# Patient Record
Sex: MALE | Race: WHITE | Employment: FULL TIME | URBAN - METROPOLITAN AREA
[De-identification: names, ages, dates, MRNs, and addresses within clinical notes are randomized per-mention and may not be internally consistent; named-entity substitution may affect disease eponyms.]

---

## 2019-07-15 ENCOUNTER — OFFICE VISIT (OUTPATIENT)
Dept: FAMILY MEDICINE CLINIC | Age: 64
End: 2019-07-15

## 2019-07-15 VITALS
TEMPERATURE: 98.6 F | WEIGHT: 282 LBS | DIASTOLIC BLOOD PRESSURE: 84 MMHG | SYSTOLIC BLOOD PRESSURE: 130 MMHG | OXYGEN SATURATION: 95 % | HEART RATE: 64 BPM

## 2019-07-15 DIAGNOSIS — I48.91 ATRIAL FIBRILLATION, UNSPECIFIED TYPE (HCC): Primary | ICD-10-CM

## 2019-07-15 DIAGNOSIS — F32.A DEPRESSION, UNSPECIFIED DEPRESSION TYPE: ICD-10-CM

## 2019-07-15 DIAGNOSIS — I10 ESSENTIAL HYPERTENSION: ICD-10-CM

## 2019-07-15 LAB
INTERNATIONAL NORMALIZATION RATIO, POC: 2.7
PROTHROMBIN TIME, POC: 32.4

## 2019-07-15 PROCEDURE — 85610 PROTHROMBIN TIME: CPT | Performed by: NURSE PRACTITIONER

## 2019-07-15 PROCEDURE — 99202 OFFICE O/P NEW SF 15 MIN: CPT | Performed by: NURSE PRACTITIONER

## 2019-07-15 RX ORDER — WARFARIN SODIUM 7.5 MG/1
7.5 TABLET ORAL EVERY OTHER DAY
Qty: 30 TABLET | Refills: 0 | Status: SHIPPED | OUTPATIENT
Start: 2019-07-15 | End: 2019-08-14 | Stop reason: SDUPTHER

## 2019-07-15 RX ORDER — PAROXETINE 10 MG/1
10 TABLET, FILM COATED ORAL EVERY MORNING
COMMUNITY
End: 2019-07-15 | Stop reason: SDUPTHER

## 2019-07-15 RX ORDER — WARFARIN SODIUM 6 MG/1
6 TABLET ORAL EVERY OTHER DAY
Qty: 30 TABLET | Refills: 0 | Status: SHIPPED | OUTPATIENT
Start: 2019-07-15 | End: 2019-08-14 | Stop reason: SDUPTHER

## 2019-07-15 RX ORDER — PAROXETINE 10 MG/1
10 TABLET, FILM COATED ORAL EVERY MORNING
Qty: 30 TABLET | Refills: 1 | Status: SHIPPED | OUTPATIENT
Start: 2019-07-15 | End: 2019-08-14 | Stop reason: SDUPTHER

## 2019-07-15 RX ORDER — WARFARIN SODIUM 7.5 MG/1
7.5 TABLET ORAL
COMMUNITY
End: 2019-07-15 | Stop reason: SDUPTHER

## 2019-07-15 ASSESSMENT — ENCOUNTER SYMPTOMS
RESPIRATORY NEGATIVE: 1
NAUSEA: 0
DIARRHEA: 0
VOMITING: 0

## 2019-07-15 NOTE — PROGRESS NOTES
Sudhakar Maldonado   58 y.o.  male  [id number]      Chief Complaint   Patient presents with    Coagulation Disorder    Medication Refill        Subjective:  63 y.o.male is here for a follow up. He has the following chronic/acute medical problems: There is no problem list on file for this patient. Patient is here today because he just moved from [place]. He is needing his INR checked and medication refills - Coumadin, Metoprolol, and Paxil. Patient states he takes 6 mg of coumadin every other day then 7.5 mg every other day. Patient states he is doing well. Review of Systems   Constitutional: Negative for appetite change, chills, fatigue and fever. HENT: Negative. Respiratory: Negative. Cardiovascular: Negative for chest pain and palpitations. Gastrointestinal: Negative for diarrhea, nausea and vomiting. Skin: Negative for rash. Neurological: Negative for dizziness, light-headedness and headaches. Current Outpatient Medications   Medication Sig Dispense Refill    metoprolol tartrate (LOPRESSOR) 25 MG tablet Take 25 mg by mouth daily      PARoxetine (PAXIL) 10 MG tablet Take 10 mg by mouth every morning      warfarin (COUMADIN) 7.5 MG tablet Take 7.5 mg by mouth       No current facility-administered medications for this visit. Past medical, family,surgical history reviewed today. Objective:  /84   Pulse 64   Temp 98.6 °F (37 °C) (Oral)   Wt 282 lb (127.9 kg)   SpO2 95%   BP Readings from Last 3 Encounters:   07/15/19 130/84     Wt Readings from Last 3 Encounters:   07/15/19 282 lb (127.9 kg)         Physical Exam   Constitutional: He is oriented to person, place, and time. He appears well-developed and well-nourished. HENT:   Head: Normocephalic. Neck: Neck supple. Cardiovascular: Normal rate, regular rhythm and normal heart sounds.    Pulmonary/Chest: Effort normal and breath sounds normal.   Neurological: He is alert and oriented to person, place, and time.   Skin: Skin is warm and dry. Psychiatric: He has a normal mood and affect. His behavior is normal.       No results found for: WBC, HGB, HCT, MCV, PLT  No results found for: NA, K, CL, CO2, BUN, CREATININE, GLUCOSE, CALCIUM, PROT, LABALBU, BILITOT, ALKPHOS, AST, ALT, LABGLOM, GFRAA, AGRATIO, GLOB  No results found for: CHOL  No results found for: TRIG  No results found for: HDL  No results found for: LDLCALC, LDLCHOLESTEROL  No results found for: LABA1C  No results found for: TSHFT4, TSH, TSHHS    Results for orders placed or performed in visit on 07/15/19   POCT INR   Result Value Ref Range    INR 2.7     Protime 32.4          ASSESSMENT/PLAN:      1. Atrial fibrillation, unspecified type ([facility]  Continue current coumadin dose. Patient to establish care with a PCP on 8/14.  - POCT INR  - warfarin (COUMADIN) 7.5 MG tablet; Take 1 tablet by mouth every other day Take 7.5 mg every other day  Dispense: 30 tablet; Refill: 0  - warfarin (COUMADIN) 6 MG tablet; Take 1 tablet by mouth every other day  Dispense: 30 tablet; Refill: 0    2. Depression, unspecified depression type  Stable. Refilled medication.  - PARoxetine (PAXIL) 10 MG tablet; Take 1 tablet by mouth every morning  Dispense: 30 tablet; Refill: 1    3. Essential hypertension  Stable. Refilled medication.   - metoprolol tartrate (LOPRESSOR) 25 MG tablet; Take 1 tablet by mouth daily  Dispense: 30 tablet; Refill: 1        No orders of the defined types were placed in this encounter. There are no discontinued medications. Care discussed with patient. Questions answered. Patient verbalizes understanding and agrees with plan. After visit summary provided. Advised to call for any problems, questions, or concerns. No follow-ups on file.                                            Signed:  INES Gutiérrez CNP  07/15/19  3:21 PM

## 2019-08-14 ENCOUNTER — OFFICE VISIT (OUTPATIENT)
Dept: FAMILY MEDICINE CLINIC | Age: 64
End: 2019-08-14
Payer: MEDICARE

## 2019-08-14 VITALS
SYSTOLIC BLOOD PRESSURE: 140 MMHG | WEIGHT: 282 LBS | HEIGHT: 69 IN | HEART RATE: 69 BPM | OXYGEN SATURATION: 97 % | DIASTOLIC BLOOD PRESSURE: 80 MMHG | TEMPERATURE: 96.6 F | BODY MASS INDEX: 41.77 KG/M2

## 2019-08-14 DIAGNOSIS — F32.A DEPRESSION, UNSPECIFIED DEPRESSION TYPE: ICD-10-CM

## 2019-08-14 DIAGNOSIS — Z98.890 S/P COMPARTMENT SYNDROME DECOMPRESSION: ICD-10-CM

## 2019-08-14 DIAGNOSIS — I10 ESSENTIAL HYPERTENSION: Primary | ICD-10-CM

## 2019-08-14 DIAGNOSIS — I87.8 VENOUS CONGESTION: ICD-10-CM

## 2019-08-14 DIAGNOSIS — Z79.01 ANTICOAGULATED ON COUMADIN: ICD-10-CM

## 2019-08-14 DIAGNOSIS — E66.01 MORBID OBESITY WITH BMI OF 40.0-44.9, ADULT (HCC): ICD-10-CM

## 2019-08-14 DIAGNOSIS — Z76.89 ESTABLISHING CARE WITH NEW DOCTOR, ENCOUNTER FOR: ICD-10-CM

## 2019-08-14 DIAGNOSIS — I48.91 ATRIAL FIBRILLATION, UNSPECIFIED TYPE (HCC): ICD-10-CM

## 2019-08-14 DIAGNOSIS — G47.30 SLEEP APNEA, UNSPECIFIED TYPE: ICD-10-CM

## 2019-08-14 LAB
INTERNATIONAL NORMALIZATION RATIO, POC: 2.9
PROTHROMBIN TIME, POC: NORMAL

## 2019-08-14 PROCEDURE — G8417 CALC BMI ABV UP PARAM F/U: HCPCS | Performed by: FAMILY MEDICINE

## 2019-08-14 PROCEDURE — G8427 DOCREV CUR MEDS BY ELIG CLIN: HCPCS | Performed by: FAMILY MEDICINE

## 2019-08-14 PROCEDURE — 1036F TOBACCO NON-USER: CPT | Performed by: FAMILY MEDICINE

## 2019-08-14 PROCEDURE — 3017F COLORECTAL CA SCREEN DOC REV: CPT | Performed by: FAMILY MEDICINE

## 2019-08-14 PROCEDURE — 85610 PROTHROMBIN TIME: CPT | Performed by: FAMILY MEDICINE

## 2019-08-14 PROCEDURE — 99214 OFFICE O/P EST MOD 30 MIN: CPT | Performed by: FAMILY MEDICINE

## 2019-08-14 RX ORDER — WARFARIN SODIUM 6 MG/1
6 TABLET ORAL EVERY OTHER DAY
Qty: 90 TABLET | Refills: 0 | Status: SHIPPED | OUTPATIENT
Start: 2019-08-14 | End: 2019-09-03 | Stop reason: ALTCHOICE

## 2019-08-14 RX ORDER — WARFARIN SODIUM 7.5 MG/1
7.5 TABLET ORAL EVERY OTHER DAY
Qty: 90 TABLET | Refills: 0 | Status: SHIPPED | OUTPATIENT
Start: 2019-08-14 | End: 2019-09-03 | Stop reason: ALTCHOICE

## 2019-08-14 RX ORDER — PAROXETINE 10 MG/1
10 TABLET, FILM COATED ORAL EVERY MORNING
Qty: 90 TABLET | Refills: 1 | Status: SHIPPED | OUTPATIENT
Start: 2019-08-14 | End: 2020-03-10 | Stop reason: SDUPTHER

## 2019-08-14 ASSESSMENT — ENCOUNTER SYMPTOMS
VOMITING: 0
SHORTNESS OF BREATH: 0
COUGH: 0
DIARRHEA: 0
ABDOMINAL PAIN: 0
BLOOD IN STOOL: 0
NAUSEA: 0
CONSTIPATION: 0

## 2019-09-03 ENCOUNTER — NURSE ONLY (OUTPATIENT)
Dept: CARDIOLOGY CLINIC | Age: 64
End: 2019-09-03

## 2019-09-03 ENCOUNTER — INITIAL CONSULT (OUTPATIENT)
Dept: CARDIOLOGY CLINIC | Age: 64
End: 2019-09-03
Payer: MEDICARE

## 2019-09-03 VITALS
WEIGHT: 282.4 LBS | SYSTOLIC BLOOD PRESSURE: 120 MMHG | HEART RATE: 60 BPM | HEIGHT: 70 IN | BODY MASS INDEX: 40.43 KG/M2 | DIASTOLIC BLOOD PRESSURE: 62 MMHG

## 2019-09-03 DIAGNOSIS — I10 ESSENTIAL HYPERTENSION: ICD-10-CM

## 2019-09-03 DIAGNOSIS — R00.2 PALPITATIONS: Primary | ICD-10-CM

## 2019-09-03 DIAGNOSIS — Z79.01 ANTICOAGULATED ON COUMADIN: ICD-10-CM

## 2019-09-03 DIAGNOSIS — M79.89 LEG SWELLING: ICD-10-CM

## 2019-09-03 DIAGNOSIS — G45.9 TIA (TRANSIENT ISCHEMIC ATTACK): ICD-10-CM

## 2019-09-03 DIAGNOSIS — R00.2 PALPITATIONS: ICD-10-CM

## 2019-09-03 DIAGNOSIS — I48.91 ATRIAL FIBRILLATION, UNSPECIFIED TYPE (HCC): ICD-10-CM

## 2019-09-03 DIAGNOSIS — G47.30 SLEEP APNEA, UNSPECIFIED TYPE: ICD-10-CM

## 2019-09-03 DIAGNOSIS — I48.91 ATRIAL FIBRILLATION, UNSPECIFIED TYPE (HCC): Primary | ICD-10-CM

## 2019-09-03 PROCEDURE — 93000 ELECTROCARDIOGRAM COMPLETE: CPT | Performed by: INTERNAL MEDICINE

## 2019-09-03 PROCEDURE — G8427 DOCREV CUR MEDS BY ELIG CLIN: HCPCS | Performed by: INTERNAL MEDICINE

## 2019-09-03 PROCEDURE — G8417 CALC BMI ABV UP PARAM F/U: HCPCS | Performed by: INTERNAL MEDICINE

## 2019-09-03 PROCEDURE — 99204 OFFICE O/P NEW MOD 45 MIN: CPT | Performed by: INTERNAL MEDICINE

## 2019-09-03 NOTE — PROGRESS NOTES
CARDIOLOGY CONSULT  NOTE    Chief Complaint: Afib    HPI:   Mena Meza is a 71y.o. year old who has Past medical history as noted below. Very pleasant gentleman with history of atrial fibrillation who comes in to establish care he is a retired  currently working part-time in [employer]. He was first diagnosed with atrial fibrillation at the age of 31 in 12. He was a runner at that time. He was cardioverted back into rhythm. Most recently up to 2008 he was doing well until he had a TIA event. He has been on Coumadin since then. Last year he had another episode of A. fib requiring hospitalization. Currently denies any palpitations chest pain or shortness of breath. Times he gets a feeling of \"blood sugar drop\" although he does not have diabetes but denies any palpitations      Current Outpatient Medications   Medication Sig Dispense Refill    rivaroxaban (XARELTO) 20 MG TABS tablet Take 1 tablet by mouth daily (with breakfast) 90 tablet 1    rivaroxaban (XARELTO) 20 MG TABS tablet Take 1 tablet by mouth daily 28 tablet 0    metoprolol tartrate (LOPRESSOR) 25 MG tablet Take 1 tablet by mouth daily 90 tablet 1    PARoxetine (PAXIL) 10 MG tablet Take 1 tablet by mouth every morning 90 tablet 1     No current facility-administered medications for this visit.         Allergies:   Aspirin    Patient History:  Past Medical History:   Diagnosis Date    Atrial fibrillation ([address]     Depression     Sleep apnea     TIA (transient ischemic attack) 2008     Past Surgical History:   Procedure Laterality Date    ABDOMEN SURGERY  02/2019    bowel perforation repair    CHOLECYSTECTOMY      FOOT SURGERY Bilateral     LEG SURGERY  2017    compartment syndrome right leg    TONSILLECTOMY       Family History   Problem Relation Age of Onset    Heart Attack Mother     Cancer Mother         ovarian    Other Mother         dementia    Other Father         murdered    thrills,S1 and S2 audible, No added heart sounds, No signs of ankle edema, or volume overload, No evidence of JVD, No crackles  GI:  Bowel sounds normal, Soft, No tenderness, No masses, No gross visceromegaly   :  No costovertebral angle tenderness   Musculoskeletal:  No edema, no tenderness, no deformities. Back- no tenderness  Integument:  Well hydrated, no rash   Lymphatic:  No lymphadenopathy noted   Neurologic:  Alert & oriented x 3, CN 2-12 normal, normal motor function, normal sensory function, no focal deficits noted   Psychiatric:  Speech and behavior appropriate       Medical decision making and Data review:  DATA:  No results found for: TROPONINT  BNP:  No results found for: PROBNP  PT/INR:  No results found for: PTINR  No results found for: LABA1C  No results found for: CHOL, TRIG, HDL, LDLCALC, LDLDIRECT  No results found for: ALT, AST  No results for input(s): WBC, HGB, HCT, MCV, PLT in the last 72 hours. TSH: No results found for: TSH  No results found for: AST, ALT, ALB, BILIDIR, BILITOT, ALKPHOS  No results found for: PROBNP  No results found for: LABA1C  No results found for: WBC, HGB, HCT, PLT  All labs, medications and tests reviewed by myself including data and history from outside source , patient and available family . Assessment & Plan:      1. Palpitations    2. Atrial fibrillation, unspecified type ([address]    3. Essential hypertension    4. Sleep apnea, unspecified type    5. Anticoagulated on Coumadin    6. TIA (transient ischemic attack)    7. Leg swelling         Atrial fibrillation (HCC)  HE has anju cardioverted once in 1986 when he was 31years old, sicne then he has had 3 to 4 more episodes of afib requiring hospital stay , IN Jan 2008 he had TIA he has been on coumadin since then . Start Xarelto if it is not too expensive we will continue it we will also give him samples advised to hold Coumadin for 5 days and then start Xarelto.   We will also get an echo to evaluate for valvular

## 2019-09-03 NOTE — ASSESSMENT & PLAN NOTE
He reports that he is compliant with CPAP machine we talked about LINDA fib and relationship with sleep apnea

## 2019-09-03 NOTE — ASSESSMENT & PLAN NOTE
HE has anju cardioverted once in [facility] when he was 31years old, sicne then he has had 3 to 4 more episodes of afib requiring hospital stay , IN Jan 2008 he had TIA he has been on coumadin since then . Start Xarelto if it is not too expensive we will continue it we will also give him samples advised to hold Coumadin for 5 days and then start Xarelto. We will also get an echo to evaluate for valvular heart disease and EF. We will place him on a 30-day monitor to evaluate for A. fib burden and decide if he needs antiarrhythmics.   If he needs antiarrhythmic she will need a stress test.

## 2019-09-09 ENCOUNTER — TELEPHONE (OUTPATIENT)
Dept: CARDIOLOGY CLINIC | Age: 64
End: 2019-09-09

## 2019-09-09 ENCOUNTER — PROCEDURE VISIT (OUTPATIENT)
Dept: CARDIOLOGY CLINIC | Age: 64
End: 2019-09-09
Payer: MEDICARE

## 2019-09-09 VITALS
WEIGHT: 282 LBS | SYSTOLIC BLOOD PRESSURE: 124 MMHG | DIASTOLIC BLOOD PRESSURE: 72 MMHG | HEIGHT: 70 IN | BODY MASS INDEX: 40.37 KG/M2 | HEART RATE: 53 BPM

## 2019-09-09 DIAGNOSIS — R00.2 PALPITATIONS: ICD-10-CM

## 2019-09-09 DIAGNOSIS — I48.91 ATRIAL FIBRILLATION, UNSPECIFIED TYPE (HCC): Primary | ICD-10-CM

## 2019-09-09 DIAGNOSIS — I48.91 ATRIAL FIBRILLATION, UNSPECIFIED TYPE (HCC): ICD-10-CM

## 2019-09-09 DIAGNOSIS — Z79.01 ANTICOAGULATED ON COUMADIN: ICD-10-CM

## 2019-09-09 DIAGNOSIS — G47.30 SLEEP APNEA, UNSPECIFIED TYPE: ICD-10-CM

## 2019-09-09 DIAGNOSIS — M79.602 LEFT ARM PAIN: Primary | ICD-10-CM

## 2019-09-09 DIAGNOSIS — I10 ESSENTIAL HYPERTENSION: ICD-10-CM

## 2019-09-09 DIAGNOSIS — G45.9 TIA (TRANSIENT ISCHEMIC ATTACK): ICD-10-CM

## 2019-09-09 PROCEDURE — 93228 REMOTE 30 DAY ECG REV/REPORT: CPT | Performed by: INTERNAL MEDICINE

## 2019-09-09 PROCEDURE — 93015 CV STRESS TEST SUPVJ I&R: CPT | Performed by: INTERNAL MEDICINE

## 2019-09-09 NOTE — LETTER
Devyn            [address] [facility], [address], [facility], [address] [facility]  [address], [address]    INFORMED CONSENT FOR EXERCISE STRESS TEST    Patient Name:Isaiah Larry      [facility]#:[id number]    Purpose & Explanation  Today I will perform an exercise test to determine my exercise capacity & state of cardiovascular health. I understand that the test will be performed on a motor-driven treadmill with the amount of effort gradually increasing. The exercise intensity will begin at a level one can easily accomplish & will be advanced in stages depending on my fitness level. During the exercise test, I consent to the monitoring of EKG, blood pressure, & expressed feelings of discomfort or effort. I have been informed that I can stop the test at any time because of signs of discomfort or fatigue. Risks & Discomforts   There exists the possibility of certain changes occurring during the test. They include:  abnormal blood pressure, fainting, disorder of the heart beat, & in rare instances, heart attack, stroke, or death. An added risk is that of self injury by tripping or falling while on the motorized treadmill. Every effort will be made to minimize these risks by evaluation of preliminary information relating to my health, fitness, & observations during the testing. Emergency equipment & trained personnel are available to deal with the unusual situations should they occur. I understand the possibility of these risks,  but it is my desire to proceed with taking this test as herein indicated. Responsibilities of the patient  Information you possess about your health status or previous experience of unusual feelings with physical effort may affect the safety & value of your exercise test. Your prompt reporting of feelings with effort during the exercise test itself, are also of great importance.  You are responsible to fully disclose such information when requested by the testing staff. Benefits to be Expected   The results obtained from the exercise test may assist in the diagnosis of your illness or in evaluating what type of physical activities you might do with low risk of harm. Inquires  Any questions about the procedures used in the exercise test or in the measurement of functional capacity are encouraged. If you have any doubts or questions, please ask us for further explanations. The information which is obtained will be treated as privileged, confidential, & will not be released or revealed to any such person, except my physician without my written consent. The information obtained, however, may be used for a statistical or scientific purpose with my right to privacy retained. Freedom of consents  Your permission to perform this exercise test is voluntary. You are free to deny consent or stop the test at any point, if you so desire. I HAVE READ THIS FORM & I UNDERSTAND THE TEST PROCEDURES THAT I WILL PERFORM. I CONSENT TO PARTICIPATE IN THIS TEST. SIGNATURE OF PATIENT:_____________________________________DATE: _____________    Tran [place]ren OF WITNESS:____________________________________DATE: _____________          REGULAR TREADMILL STRESS TEST   Date:______________    Patient Name:Isaiah Summers  [facility]#:[id number]  Doctor:Dr. Shon Hilton  Age:64 y.o. Sex:male    Allergies:Aspirin    Ht:  5'10\"   Wt: 282#    Medical History:                                             **Indications for Test:  Abn.EKG-A.Fib. Past Medical History:   Diagnosis Date    Abnormal EKG     Atrial fibrillation (HCC) 1986    Depression     Family history of coronary artery disease     Mother-MI.     Non-smoker     Obesity     Sleep apnea     TIA (transient ischemic attack) 2008     Chest Pain Character: DENIES Burning  Pounding  Dull Ache/Discomfort     Squeezing  Pressure Twinge Sharp/Stab Palpitations Heaviness  Soreness SOB/Tightness   Location: Substernal  Anterior Chest  Precordial Pain  Other   Radiation:  None  Left   Arm  Right   Arm Lt. Rt.    Jaw    Lt. Rt. Neck Back  Right Shoulder  Left Shoulder   Precip. Factors: None  Exertion  Exercise  Stress     Eating  Other      Relieved By:  Rest  NTG Antacids  Other                                                      **COMMENTS DURING STRESS**  Chest Pain:   Arrhythmia:   ST Changes:   Symptoms:    SOB    Fatigue    Flushing/Diaphoresis    Palpitations    Dizziness            Sx's Resolved      THR Achieved:           YES        NO           N/A   Exercise Effort:          Poor        Fair         Good          Excellent         N/A   B-Blocker Held:          YES        NO          N/A          **Beta-Blocker: Lopressor (metoprolol)   Harmeet-dur Held:            YES        NO          N/A      METS:                                         %MPHR:                                  **THR: _____________**        Pre BP:  Recovery BP:  Heart Rate:   Post BP:  2 min BP:    Resting HR:  3 min BP:    Max. HR: 4 min BP:     Tot. Exer. Time:                          min Max. Speed:  Elevation:                         %           Stage  MPH Grade Time B/P HR   I 1.7 10%      II 2.5 12%      III 3.4 14%      IV 4.2 16%      V 5 18%

## 2019-09-10 ENCOUNTER — TELEPHONE (OUTPATIENT)
Dept: CARDIOLOGY CLINIC | Age: 64
End: 2019-09-10

## 2019-09-10 DIAGNOSIS — I48.91 ATRIAL FIBRILLATION, UNSPECIFIED TYPE (HCC): Primary | ICD-10-CM

## 2019-09-11 ENCOUNTER — PROCEDURE VISIT (OUTPATIENT)
Dept: CARDIOLOGY CLINIC | Age: 64
End: 2019-09-11
Payer: MEDICARE

## 2019-09-11 ENCOUNTER — NURSE ONLY (OUTPATIENT)
Dept: FAMILY MEDICINE CLINIC | Age: 64
End: 2019-09-11
Payer: MEDICARE

## 2019-09-11 DIAGNOSIS — G45.9 TIA (TRANSIENT ISCHEMIC ATTACK): ICD-10-CM

## 2019-09-11 DIAGNOSIS — I10 ESSENTIAL HYPERTENSION: ICD-10-CM

## 2019-09-11 DIAGNOSIS — R00.2 PALPITATIONS: ICD-10-CM

## 2019-09-11 DIAGNOSIS — I48.91 ATRIAL FIBRILLATION, UNSPECIFIED TYPE (HCC): ICD-10-CM

## 2019-09-11 DIAGNOSIS — I48.91 ATRIAL FIBRILLATION, UNSPECIFIED TYPE (HCC): Primary | ICD-10-CM

## 2019-09-11 DIAGNOSIS — Z79.01 ANTICOAGULATED ON COUMADIN: Primary | ICD-10-CM

## 2019-09-11 DIAGNOSIS — Z79.01 ANTICOAGULATED ON COUMADIN: ICD-10-CM

## 2019-09-11 DIAGNOSIS — G47.30 SLEEP APNEA, UNSPECIFIED TYPE: ICD-10-CM

## 2019-09-11 LAB
INTERNATIONAL NORMALIZATION RATIO, POC: 1.7
LV EF: 48 %
LV EF: 58 %
LVEF MODALITY: NORMAL
LVEF MODALITY: NORMAL
PROTHROMBIN TIME, POC: ABNORMAL

## 2019-09-11 PROCEDURE — 78452 HT MUSCLE IMAGE SPECT MULT: CPT | Performed by: INTERNAL MEDICINE

## 2019-09-11 PROCEDURE — A9500 TC99M SESTAMIBI: HCPCS | Performed by: INTERNAL MEDICINE

## 2019-09-11 PROCEDURE — 93016 CV STRESS TEST SUPVJ ONLY: CPT | Performed by: INTERNAL MEDICINE

## 2019-09-11 PROCEDURE — 85610 PROTHROMBIN TIME: CPT | Performed by: FAMILY MEDICINE

## 2019-09-11 PROCEDURE — 93306 TTE W/DOPPLER COMPLETE: CPT | Performed by: INTERNAL MEDICINE

## 2019-09-11 PROCEDURE — 93017 CV STRESS TEST TRACING ONLY: CPT | Performed by: INTERNAL MEDICINE

## 2019-09-11 PROCEDURE — 93018 CV STRESS TEST I&R ONLY: CPT | Performed by: INTERNAL MEDICINE

## 2019-09-12 ENCOUNTER — TELEPHONE (OUTPATIENT)
Dept: CARDIOLOGY CLINIC | Age: 64
End: 2019-09-12

## 2019-09-23 ENCOUNTER — OFFICE VISIT (OUTPATIENT)
Dept: FAMILY MEDICINE CLINIC | Age: 64
End: 2019-09-23
Payer: MEDICARE

## 2019-09-23 DIAGNOSIS — Z79.01 ANTICOAGULATED ON COUMADIN: Primary | ICD-10-CM

## 2019-09-23 LAB
INTERNATIONAL NORMALIZATION RATIO, POC: 2.7
PROTHROMBIN TIME, POC: NORMAL

## 2019-09-23 PROCEDURE — 85610 PROTHROMBIN TIME: CPT | Performed by: FAMILY MEDICINE

## 2019-10-10 ENCOUNTER — TELEPHONE (OUTPATIENT)
Dept: CARDIOLOGY CLINIC | Age: 64
End: 2019-10-10

## 2019-10-14 ENCOUNTER — PROCEDURE VISIT (OUTPATIENT)
Dept: CARDIOLOGY CLINIC | Age: 64
End: 2019-10-14
Payer: MEDICARE

## 2019-10-14 DIAGNOSIS — M79.89 LEG SWELLING: ICD-10-CM

## 2019-10-14 DIAGNOSIS — G47.30 SLEEP APNEA, UNSPECIFIED TYPE: ICD-10-CM

## 2019-10-14 DIAGNOSIS — G45.9 TIA (TRANSIENT ISCHEMIC ATTACK): ICD-10-CM

## 2019-10-14 DIAGNOSIS — I48.91 ATRIAL FIBRILLATION, UNSPECIFIED TYPE (HCC): ICD-10-CM

## 2019-10-14 DIAGNOSIS — I10 ESSENTIAL HYPERTENSION: ICD-10-CM

## 2019-10-14 DIAGNOSIS — R00.2 PALPITATIONS: ICD-10-CM

## 2019-10-14 DIAGNOSIS — Z79.01 ANTICOAGULATED ON COUMADIN: ICD-10-CM

## 2019-10-14 PROCEDURE — 93970 EXTREMITY STUDY: CPT | Performed by: INTERNAL MEDICINE

## 2019-10-15 ENCOUNTER — TELEPHONE (OUTPATIENT)
Dept: CARDIOLOGY CLINIC | Age: 64
End: 2019-10-15

## 2019-10-23 ENCOUNTER — NURSE ONLY (OUTPATIENT)
Dept: FAMILY MEDICINE CLINIC | Age: 64
End: 2019-10-23
Payer: MEDICARE

## 2019-10-23 DIAGNOSIS — Z79.01 ANTICOAGULATED ON COUMADIN: Primary | ICD-10-CM

## 2019-10-23 LAB
INTERNATIONAL NORMALIZATION RATIO, POC: 2.8
PROTHROMBIN TIME, POC: NORMAL

## 2019-10-23 PROCEDURE — 85610 PROTHROMBIN TIME: CPT | Performed by: FAMILY MEDICINE

## 2019-11-05 ENCOUNTER — OFFICE VISIT (OUTPATIENT)
Dept: CARDIOLOGY CLINIC | Age: 64
End: 2019-11-05
Payer: MEDICARE

## 2019-11-05 VITALS
WEIGHT: 276.2 LBS | HEART RATE: 60 BPM | DIASTOLIC BLOOD PRESSURE: 82 MMHG | HEIGHT: 70 IN | SYSTOLIC BLOOD PRESSURE: 118 MMHG | BODY MASS INDEX: 39.54 KG/M2

## 2019-11-05 DIAGNOSIS — I10 ESSENTIAL HYPERTENSION: ICD-10-CM

## 2019-11-05 DIAGNOSIS — Z79.01 ANTICOAGULATED ON COUMADIN: ICD-10-CM

## 2019-11-05 DIAGNOSIS — M79.89 LEG SWELLING: Primary | ICD-10-CM

## 2019-11-05 DIAGNOSIS — G47.30 SLEEP APNEA, UNSPECIFIED TYPE: ICD-10-CM

## 2019-11-05 DIAGNOSIS — G45.9 TIA (TRANSIENT ISCHEMIC ATTACK): ICD-10-CM

## 2019-11-05 DIAGNOSIS — Z98.890 S/P COMPARTMENT SYNDROME DECOMPRESSION: ICD-10-CM

## 2019-11-05 DIAGNOSIS — I48.91 ATRIAL FIBRILLATION, UNSPECIFIED TYPE (HCC): ICD-10-CM

## 2019-11-05 PROCEDURE — 1036F TOBACCO NON-USER: CPT | Performed by: INTERNAL MEDICINE

## 2019-11-05 PROCEDURE — 4040F PNEUMOC VAC/ADMIN/RCVD: CPT | Performed by: INTERNAL MEDICINE

## 2019-11-05 PROCEDURE — G8427 DOCREV CUR MEDS BY ELIG CLIN: HCPCS | Performed by: INTERNAL MEDICINE

## 2019-11-05 PROCEDURE — G8417 CALC BMI ABV UP PARAM F/U: HCPCS | Performed by: INTERNAL MEDICINE

## 2019-11-05 PROCEDURE — 3017F COLORECTAL CA SCREEN DOC REV: CPT | Performed by: INTERNAL MEDICINE

## 2019-11-05 PROCEDURE — G8484 FLU IMMUNIZE NO ADMIN: HCPCS | Performed by: INTERNAL MEDICINE

## 2019-11-05 PROCEDURE — 1123F ACP DISCUSS/DSCN MKR DOCD: CPT | Performed by: INTERNAL MEDICINE

## 2019-11-05 PROCEDURE — 99214 OFFICE O/P EST MOD 30 MIN: CPT | Performed by: INTERNAL MEDICINE

## 2019-11-05 RX ORDER — WARFARIN SODIUM 6 MG/1
6 TABLET ORAL
COMMUNITY
End: 2019-12-30

## 2019-11-11 ENCOUNTER — HOSPITAL ENCOUNTER (OUTPATIENT)
Age: 64
Discharge: HOME | End: 2019-11-11
Payer: MEDICARE

## 2019-11-11 ENCOUNTER — TELEPHONE (OUTPATIENT)
Dept: CARDIOLOGY CLINIC | Age: 64
End: 2019-11-11

## 2019-11-11 ENCOUNTER — HOSPITAL ENCOUNTER (OUTPATIENT)
Dept: GENERAL RADIOLOGY | Age: 64
Discharge: HOME | End: 2019-11-11
Payer: MEDICARE

## 2019-11-11 DIAGNOSIS — Z01.810 PRE-OPERATIVE CARDIOVASCULAR EXAMINATION: ICD-10-CM

## 2019-11-11 LAB
ABO/RH: NORMAL
ANION GAP SERPL CALCULATED.3IONS-SCNC: 12 MMOL/L (ref 4–16)
ANTIBODY SCREEN: NEGATIVE
APTT: 26.7 SECONDS (ref 25.1–37.1)
BASOPHILS ABSOLUTE: 0 K/CU MM
BASOPHILS RELATIVE PERCENT: 0.5 % (ref 0–1)
BUN BLDV-MCNC: 11 MG/DL (ref 6–23)
CALCIUM SERPL-MCNC: 9.7 MG/DL (ref 8.3–10.6)
CHLORIDE BLD-SCNC: 102 MMOL/L (ref 99–110)
CO2: 25 MMOL/L (ref 21–32)
COMMENT: NORMAL
CREAT SERPL-MCNC: 0.8 MG/DL (ref 0.9–1.3)
DIFFERENTIAL TYPE: ABNORMAL
EOSINOPHILS ABSOLUTE: 0.3 K/CU MM
EOSINOPHILS RELATIVE PERCENT: 3.8 % (ref 0–3)
GFR AFRICAN AMERICAN: >60 ML/MIN/1.73M2
GFR NON-AFRICAN AMERICAN: >60 ML/MIN/1.73M2
GLUCOSE BLD-MCNC: 97 MG/DL (ref 70–99)
HCT VFR BLD CALC: 49.3 % (ref 42–52)
HEMOGLOBIN: 16.7 GM/DL (ref 13.5–18)
IMMATURE NEUTROPHIL %: 0.2 % (ref 0–0.43)
INR BLD: 1.1 INDEX
LYMPHOCYTES ABSOLUTE: 1.4 K/CU MM
LYMPHOCYTES RELATIVE PERCENT: 21 % (ref 24–44)
MCH RBC QN AUTO: 32.4 PG (ref 27–31)
MCHC RBC AUTO-ENTMCNC: 33.9 % (ref 32–36)
MCV RBC AUTO: 95.5 FL (ref 78–100)
MONOCYTES ABSOLUTE: 0.8 K/CU MM
MONOCYTES RELATIVE PERCENT: 12.4 % (ref 0–4)
NUCLEATED RBC %: 0 %
PDW BLD-RTO: 13.2 % (ref 11.7–14.9)
PLATELET # BLD: 208 K/CU MM (ref 140–440)
PMV BLD AUTO: 11.4 FL (ref 7.5–11.1)
POTASSIUM SERPL-SCNC: 4.6 MMOL/L (ref 3.5–5.1)
PROTHROMBIN TIME: 12.8 SECONDS (ref 11.7–14.5)
RBC # BLD: 5.16 M/CU MM (ref 4.6–6.2)
SEGMENTED NEUTROPHILS ABSOLUTE COUNT: 4.1 K/CU MM
SEGMENTED NEUTROPHILS RELATIVE PERCENT: 62.1 % (ref 36–66)
SODIUM BLD-SCNC: 139 MMOL/L (ref 135–145)
TOTAL IMMATURE NEUTOROPHIL: 0.01 K/CU MM
TOTAL NUCLEATED RBC: 0 K/CU MM
WBC # BLD: 6.6 K/CU MM (ref 4–10.5)

## 2019-11-11 PROCEDURE — 85610 PROTHROMBIN TIME: CPT

## 2019-11-11 PROCEDURE — 80048 BASIC METABOLIC PNL TOTAL CA: CPT

## 2019-11-11 PROCEDURE — 71046 X-RAY EXAM CHEST 2 VIEWS: CPT

## 2019-11-11 PROCEDURE — 86850 RBC ANTIBODY SCREEN: CPT

## 2019-11-11 PROCEDURE — 86900 BLOOD TYPING SEROLOGIC ABO: CPT

## 2019-11-11 PROCEDURE — 36415 COLL VENOUS BLD VENIPUNCTURE: CPT

## 2019-11-11 PROCEDURE — 85730 THROMBOPLASTIN TIME PARTIAL: CPT

## 2019-11-11 PROCEDURE — 86901 BLOOD TYPING SEROLOGIC RH(D): CPT

## 2019-11-11 PROCEDURE — 85025 COMPLETE CBC W/AUTO DIFF WBC: CPT

## 2019-11-12 ENCOUNTER — HOSPITAL ENCOUNTER (OUTPATIENT)
Dept: CARDIAC CATH/INVASIVE PROCEDURES | Age: 64
Discharge: HOME | End: 2019-11-12
Attending: INTERNAL MEDICINE | Admitting: INTERNAL MEDICINE
Payer: MEDICARE

## 2019-11-12 VITALS
HEART RATE: 66 BPM | DIASTOLIC BLOOD PRESSURE: 71 MMHG | TEMPERATURE: 96.9 F | SYSTOLIC BLOOD PRESSURE: 119 MMHG | OXYGEN SATURATION: 95 % | BODY MASS INDEX: 39.22 KG/M2 | WEIGHT: 274 LBS | RESPIRATION RATE: 18 BRPM | HEIGHT: 70 IN

## 2019-11-12 PROCEDURE — C1894 INTRO/SHEATH, NON-LASER: HCPCS

## 2019-11-12 PROCEDURE — 6370000000 HC RX 637 (ALT 250 FOR IP): Performed by: INTERNAL MEDICINE

## 2019-11-12 PROCEDURE — 2500000003 HC RX 250 WO HCPCS

## 2019-11-12 PROCEDURE — C1887 CATHETER, GUIDING: HCPCS

## 2019-11-12 PROCEDURE — 6360000004 HC RX CONTRAST MEDICATION

## 2019-11-12 PROCEDURE — 6360000002 HC RX W HCPCS

## 2019-11-12 PROCEDURE — C1769 GUIDE WIRE: HCPCS

## 2019-11-12 PROCEDURE — 2580000003 HC RX 258: Performed by: INTERNAL MEDICINE

## 2019-11-12 PROCEDURE — 93458 L HRT ARTERY/VENTRICLE ANGIO: CPT | Performed by: INTERNAL MEDICINE

## 2019-11-12 PROCEDURE — C1725 CATH, TRANSLUMIN NON-LASER: HCPCS

## 2019-11-12 PROCEDURE — 2709999900 HC NON-CHARGEABLE SUPPLY

## 2019-11-12 PROCEDURE — 93459 L HRT ART/GRFT ANGIO: CPT

## 2019-11-12 RX ORDER — ONDANSETRON 2 MG/ML
4 INJECTION INTRAMUSCULAR; INTRAVENOUS EVERY 6 HOURS PRN
Status: DISCONTINUED | OUTPATIENT
Start: 2019-11-12 | End: 2019-11-12 | Stop reason: HOSPADM

## 2019-11-12 RX ORDER — SODIUM CHLORIDE 0.9 % (FLUSH) 0.9 %
10 SYRINGE (ML) INJECTION PRN
Status: DISCONTINUED | OUTPATIENT
Start: 2019-11-12 | End: 2019-11-12 | Stop reason: HOSPADM

## 2019-11-12 RX ORDER — ACETAMINOPHEN 325 MG/1
650 TABLET ORAL EVERY 4 HOURS PRN
Status: DISCONTINUED | OUTPATIENT
Start: 2019-11-12 | End: 2019-11-12 | Stop reason: HOSPADM

## 2019-11-12 RX ORDER — DIAZEPAM 5 MG/1
5 TABLET ORAL ONCE
Status: COMPLETED | OUTPATIENT
Start: 2019-11-12 | End: 2019-11-12

## 2019-11-12 RX ORDER — SODIUM CHLORIDE 0.9 % (FLUSH) 0.9 %
10 SYRINGE (ML) INJECTION EVERY 12 HOURS SCHEDULED
Status: DISCONTINUED | OUTPATIENT
Start: 2019-11-12 | End: 2019-11-12 | Stop reason: HOSPADM

## 2019-11-12 RX ORDER — SODIUM CHLORIDE 9 MG/ML
INJECTION, SOLUTION INTRAVENOUS CONTINUOUS
Status: DISCONTINUED | OUTPATIENT
Start: 2019-11-12 | End: 2019-11-12 | Stop reason: HOSPADM

## 2019-11-12 RX ORDER — 0.9 % SODIUM CHLORIDE 0.9 %
500 INTRAVENOUS SOLUTION INTRAVENOUS ONCE
Status: DISCONTINUED | OUTPATIENT
Start: 2019-11-12 | End: 2019-11-12 | Stop reason: HOSPADM

## 2019-11-12 RX ORDER — DIPHENHYDRAMINE HCL 25 MG
25 TABLET ORAL ONCE
Status: COMPLETED | OUTPATIENT
Start: 2019-11-12 | End: 2019-11-12

## 2019-11-12 RX ORDER — HYDRALAZINE HYDROCHLORIDE 20 MG/ML
10 INJECTION INTRAMUSCULAR; INTRAVENOUS EVERY 10 MIN PRN
Status: DISCONTINUED | OUTPATIENT
Start: 2019-11-12 | End: 2019-11-12 | Stop reason: HOSPADM

## 2019-11-12 RX ADMIN — SODIUM CHLORIDE: 9 INJECTION, SOLUTION INTRAVENOUS at 07:16

## 2019-11-12 RX ADMIN — DIPHENHYDRAMINE HYDROCHLORIDE 25 MG: 25 TABLET ORAL at 07:20

## 2019-11-12 RX ADMIN — DIAZEPAM 5 MG: 5 TABLET ORAL at 07:19

## 2019-11-27 ENCOUNTER — INITIAL CONSULT (OUTPATIENT)
Dept: CARDIOLOGY CLINIC | Age: 64
End: 2019-11-27
Payer: MEDICARE

## 2019-11-27 VITALS
RESPIRATION RATE: 14 BRPM | OXYGEN SATURATION: 96 % | WEIGHT: 279 LBS | SYSTOLIC BLOOD PRESSURE: 118 MMHG | HEART RATE: 65 BPM | HEIGHT: 70 IN | BODY MASS INDEX: 39.94 KG/M2 | DIASTOLIC BLOOD PRESSURE: 72 MMHG

## 2019-11-27 DIAGNOSIS — I49.9 IRREGULAR HEART BEAT: ICD-10-CM

## 2019-11-27 DIAGNOSIS — I48.0 PAF (PAROXYSMAL ATRIAL FIBRILLATION) (HCC): Primary | ICD-10-CM

## 2019-11-27 PROCEDURE — G8427 DOCREV CUR MEDS BY ELIG CLIN: HCPCS | Performed by: INTERNAL MEDICINE

## 2019-11-27 PROCEDURE — G8484 FLU IMMUNIZE NO ADMIN: HCPCS | Performed by: INTERNAL MEDICINE

## 2019-11-27 PROCEDURE — 1036F TOBACCO NON-USER: CPT | Performed by: INTERNAL MEDICINE

## 2019-11-27 PROCEDURE — 93000 ELECTROCARDIOGRAM COMPLETE: CPT | Performed by: INTERNAL MEDICINE

## 2019-11-27 PROCEDURE — 1123F ACP DISCUSS/DSCN MKR DOCD: CPT | Performed by: INTERNAL MEDICINE

## 2019-11-27 PROCEDURE — 99213 OFFICE O/P EST LOW 20 MIN: CPT | Performed by: INTERNAL MEDICINE

## 2019-11-27 PROCEDURE — 3017F COLORECTAL CA SCREEN DOC REV: CPT | Performed by: INTERNAL MEDICINE

## 2019-11-27 PROCEDURE — 4040F PNEUMOC VAC/ADMIN/RCVD: CPT | Performed by: INTERNAL MEDICINE

## 2019-11-27 PROCEDURE — G8417 CALC BMI ABV UP PARAM F/U: HCPCS | Performed by: INTERNAL MEDICINE

## 2019-12-06 ENCOUNTER — OFFICE VISIT (OUTPATIENT)
Dept: CARDIOLOGY CLINIC | Age: 64
End: 2019-12-06

## 2019-12-06 DIAGNOSIS — I48.91 ATRIAL FIBRILLATION, UNSPECIFIED TYPE (HCC): Primary | ICD-10-CM

## 2019-12-10 ENCOUNTER — HOSPITAL ENCOUNTER (INPATIENT)
Age: 64
LOS: 3 days | Discharge: HOME | DRG: 309 | End: 2019-12-13
Attending: INTERNAL MEDICINE | Admitting: INTERNAL MEDICINE
Payer: MEDICARE

## 2019-12-10 PROBLEM — I48.91 A-FIB (HCC): Status: ACTIVE | Noted: 2019-12-10

## 2019-12-10 PROBLEM — Z51.81 ENCOUNTER FOR MONITORING SOTALOL THERAPY: Status: ACTIVE | Noted: 2019-12-10

## 2019-12-10 PROBLEM — Z79.899 ENCOUNTER FOR MONITORING SOTALOL THERAPY: Status: ACTIVE | Noted: 2019-12-10

## 2019-12-10 LAB
ANION GAP SERPL CALCULATED.3IONS-SCNC: 12 MMOL/L (ref 4–16)
BUN BLDV-MCNC: 16 MG/DL (ref 6–23)
CALCIUM SERPL-MCNC: 9.5 MG/DL (ref 8.3–10.6)
CHLORIDE BLD-SCNC: 101 MMOL/L (ref 99–110)
CO2: 26 MMOL/L (ref 21–32)
CREAT SERPL-MCNC: 0.8 MG/DL (ref 0.9–1.3)
EKG ATRIAL RATE: 62 BPM
EKG DIAGNOSIS: NORMAL
EKG P AXIS: 36 DEGREES
EKG P-R INTERVAL: 178 MS
EKG Q-T INTERVAL: 446 MS
EKG QRS DURATION: 106 MS
EKG QTC CALCULATION (BAZETT): 452 MS
EKG R AXIS: -28 DEGREES
EKG T AXIS: 13 DEGREES
EKG VENTRICULAR RATE: 62 BPM
GFR AFRICAN AMERICAN: >60 ML/MIN/1.73M2
GFR NON-AFRICAN AMERICAN: >60 ML/MIN/1.73M2
GLUCOSE BLD-MCNC: 82 MG/DL (ref 70–99)
INR BLD: 2.24 INDEX
MAGNESIUM: 2.1 MG/DL (ref 1.8–2.4)
PHOSPHORUS: 2.9 MG/DL (ref 2.5–4.9)
POTASSIUM SERPL-SCNC: 4.2 MMOL/L (ref 3.5–5.1)
PROTHROMBIN TIME: 27.3 SECONDS (ref 11.7–14.5)
SODIUM BLD-SCNC: 139 MMOL/L (ref 135–145)

## 2019-12-10 PROCEDURE — 99221 1ST HOSP IP/OBS SF/LOW 40: CPT | Performed by: NURSE PRACTITIONER

## 2019-12-10 PROCEDURE — 2580000003 HC RX 258: Performed by: NURSE PRACTITIONER

## 2019-12-10 PROCEDURE — 6370000000 HC RX 637 (ALT 250 FOR IP): Performed by: NURSE PRACTITIONER

## 2019-12-10 PROCEDURE — 83735 ASSAY OF MAGNESIUM: CPT

## 2019-12-10 PROCEDURE — 93005 ELECTROCARDIOGRAM TRACING: CPT | Performed by: NURSE PRACTITIONER

## 2019-12-10 PROCEDURE — 36415 COLL VENOUS BLD VENIPUNCTURE: CPT

## 2019-12-10 PROCEDURE — 80048 BASIC METABOLIC PNL TOTAL CA: CPT

## 2019-12-10 PROCEDURE — 93010 ELECTROCARDIOGRAM REPORT: CPT | Performed by: INTERNAL MEDICINE

## 2019-12-10 PROCEDURE — 84100 ASSAY OF PHOSPHORUS: CPT

## 2019-12-10 PROCEDURE — 85610 PROTHROMBIN TIME: CPT

## 2019-12-10 PROCEDURE — 2140000000 HC CCU INTERMEDIATE R&B

## 2019-12-10 RX ORDER — WARFARIN SODIUM 6 MG/1
6 TABLET ORAL DAILY
Status: DISCONTINUED | OUTPATIENT
Start: 2019-12-10 | End: 2019-12-10 | Stop reason: DRUGHIGH

## 2019-12-10 RX ORDER — WARFARIN SODIUM 7.5 MG/1
7.5 TABLET ORAL EVERY OTHER DAY
Status: DISCONTINUED | OUTPATIENT
Start: 2019-12-10 | End: 2019-12-13 | Stop reason: HOSPADM

## 2019-12-10 RX ORDER — SODIUM CHLORIDE 0.9 % (FLUSH) 0.9 %
10 SYRINGE (ML) INJECTION EVERY 12 HOURS SCHEDULED
Status: DISCONTINUED | OUTPATIENT
Start: 2019-12-10 | End: 2019-12-13 | Stop reason: HOSPADM

## 2019-12-10 RX ORDER — WARFARIN SODIUM 6 MG/1
6 TABLET ORAL EVERY OTHER DAY
Status: DISCONTINUED | OUTPATIENT
Start: 2019-12-11 | End: 2019-12-13 | Stop reason: HOSPADM

## 2019-12-10 RX ORDER — PAROXETINE 10 MG/1
10 TABLET, FILM COATED ORAL EVERY MORNING
Status: DISCONTINUED | OUTPATIENT
Start: 2019-12-11 | End: 2019-12-13 | Stop reason: HOSPADM

## 2019-12-10 RX ORDER — SODIUM CHLORIDE 0.9 % (FLUSH) 0.9 %
10 SYRINGE (ML) INJECTION PRN
Status: DISCONTINUED | OUTPATIENT
Start: 2019-12-10 | End: 2019-12-13 | Stop reason: HOSPADM

## 2019-12-10 RX ORDER — SOTALOL HYDROCHLORIDE 80 MG/1
80 TABLET ORAL 2 TIMES DAILY
Status: DISCONTINUED | OUTPATIENT
Start: 2019-12-10 | End: 2019-12-13

## 2019-12-10 RX ORDER — WARFARIN SODIUM 7.5 MG/1
7.5 TABLET ORAL EVERY OTHER DAY
Status: DISCONTINUED | OUTPATIENT
Start: 2019-12-11 | End: 2019-12-10

## 2019-12-10 RX ORDER — ACETAMINOPHEN 325 MG/1
650 TABLET ORAL EVERY 4 HOURS PRN
Status: DISCONTINUED | OUTPATIENT
Start: 2019-12-10 | End: 2019-12-13 | Stop reason: HOSPADM

## 2019-12-10 RX ORDER — WARFARIN SODIUM 6 MG/1
6 TABLET ORAL EVERY OTHER DAY
Status: DISCONTINUED | OUTPATIENT
Start: 2019-12-12 | End: 2019-12-10

## 2019-12-10 RX ADMIN — WARFARIN SODIUM 7.5 MG: 7.5 TABLET ORAL at 21:26

## 2019-12-10 RX ADMIN — Medication 10 ML: at 09:48

## 2019-12-10 RX ADMIN — SOTALOL HYDROCHLORIDE 80 MG: 80 TABLET ORAL at 21:26

## 2019-12-10 RX ADMIN — Medication 10 ML: at 21:26

## 2019-12-10 RX ADMIN — SOTALOL HYDROCHLORIDE 80 MG: 80 TABLET ORAL at 09:48

## 2019-12-10 ASSESSMENT — PAIN SCALES - GENERAL
PAINLEVEL_OUTOF10: 0
PAINLEVEL_OUTOF10: 0

## 2019-12-11 LAB
ANION GAP SERPL CALCULATED.3IONS-SCNC: 13 MMOL/L (ref 4–16)
BUN BLDV-MCNC: 12 MG/DL (ref 6–23)
CALCIUM SERPL-MCNC: 8.9 MG/DL (ref 8.3–10.6)
CHLORIDE BLD-SCNC: 103 MMOL/L (ref 99–110)
CO2: 20 MMOL/L (ref 21–32)
CREAT SERPL-MCNC: 0.8 MG/DL (ref 0.9–1.3)
EKG ATRIAL RATE: 55 BPM
EKG ATRIAL RATE: 57 BPM
EKG DIAGNOSIS: NORMAL
EKG DIAGNOSIS: NORMAL
EKG P AXIS: 33 DEGREES
EKG P AXIS: 56 DEGREES
EKG P-R INTERVAL: 210 MS
EKG P-R INTERVAL: 224 MS
EKG Q-T INTERVAL: 474 MS
EKG Q-T INTERVAL: 476 MS
EKG QRS DURATION: 100 MS
EKG QRS DURATION: 98 MS
EKG QTC CALCULATION (BAZETT): 453 MS
EKG QTC CALCULATION (BAZETT): 463 MS
EKG R AXIS: -15 DEGREES
EKG R AXIS: -42 DEGREES
EKG T AXIS: -57 DEGREES
EKG T AXIS: 3 DEGREES
EKG VENTRICULAR RATE: 55 BPM
EKG VENTRICULAR RATE: 57 BPM
GFR AFRICAN AMERICAN: >60 ML/MIN/1.73M2
GFR NON-AFRICAN AMERICAN: >60 ML/MIN/1.73M2
GLUCOSE BLD-MCNC: 100 MG/DL (ref 70–99)
HCT VFR BLD CALC: 51.7 % (ref 42–52)
HEMOGLOBIN: 16.7 GM/DL (ref 13.5–18)
INR BLD: 2.13 INDEX
MAGNESIUM: 1.9 MG/DL (ref 1.8–2.4)
MCH RBC QN AUTO: 32.1 PG (ref 27–31)
MCHC RBC AUTO-ENTMCNC: 32.3 % (ref 32–36)
MCV RBC AUTO: 99.4 FL (ref 78–100)
PDW BLD-RTO: 13.1 % (ref 11.7–14.9)
PLATELET # BLD: 193 K/CU MM (ref 140–440)
PMV BLD AUTO: 10.8 FL (ref 7.5–11.1)
POTASSIUM SERPL-SCNC: 4.6 MMOL/L (ref 3.5–5.1)
PROTHROMBIN TIME: 26 SECONDS (ref 11.7–14.5)
RBC # BLD: 5.2 M/CU MM (ref 4.6–6.2)
SODIUM BLD-SCNC: 136 MMOL/L (ref 135–145)
WBC # BLD: 6.9 K/CU MM (ref 4–10.5)

## 2019-12-11 PROCEDURE — 83735 ASSAY OF MAGNESIUM: CPT

## 2019-12-11 PROCEDURE — 80048 BASIC METABOLIC PNL TOTAL CA: CPT

## 2019-12-11 PROCEDURE — 6370000000 HC RX 637 (ALT 250 FOR IP): Performed by: NURSE PRACTITIONER

## 2019-12-11 PROCEDURE — 85027 COMPLETE CBC AUTOMATED: CPT

## 2019-12-11 PROCEDURE — 2580000003 HC RX 258: Performed by: NURSE PRACTITIONER

## 2019-12-11 PROCEDURE — 2140000000 HC CCU INTERMEDIATE R&B

## 2019-12-11 PROCEDURE — 99231 SBSQ HOSP IP/OBS SF/LOW 25: CPT | Performed by: NURSE PRACTITIONER

## 2019-12-11 PROCEDURE — 93010 ELECTROCARDIOGRAM REPORT: CPT | Performed by: INTERNAL MEDICINE

## 2019-12-11 PROCEDURE — 85610 PROTHROMBIN TIME: CPT

## 2019-12-11 PROCEDURE — 36415 COLL VENOUS BLD VENIPUNCTURE: CPT

## 2019-12-11 PROCEDURE — 93005 ELECTROCARDIOGRAM TRACING: CPT | Performed by: NURSE PRACTITIONER

## 2019-12-11 RX ADMIN — Medication 10 ML: at 09:07

## 2019-12-11 RX ADMIN — SOTALOL HYDROCHLORIDE 80 MG: 80 TABLET ORAL at 09:07

## 2019-12-11 RX ADMIN — WARFARIN SODIUM 6 MG: 6 TABLET ORAL at 18:49

## 2019-12-11 RX ADMIN — PAROXETINE 10 MG: 10 TABLET, FILM COATED ORAL at 09:07

## 2019-12-11 RX ADMIN — Medication 10 ML: at 22:38

## 2019-12-11 RX ADMIN — Medication 200 MG: at 13:49

## 2019-12-11 RX ADMIN — SOTALOL HYDROCHLORIDE 80 MG: 80 TABLET ORAL at 22:38

## 2019-12-11 ASSESSMENT — PAIN SCALES - GENERAL
PAINLEVEL_OUTOF10: 0
PAINLEVEL_OUTOF10: 0

## 2019-12-12 LAB
ANION GAP SERPL CALCULATED.3IONS-SCNC: 10 MMOL/L (ref 4–16)
BUN BLDV-MCNC: 14 MG/DL (ref 6–23)
CALCIUM SERPL-MCNC: 9 MG/DL (ref 8.3–10.6)
CHLORIDE BLD-SCNC: 103 MMOL/L (ref 99–110)
CO2: 25 MMOL/L (ref 21–32)
CREAT SERPL-MCNC: 0.9 MG/DL (ref 0.9–1.3)
EKG ATRIAL RATE: 55 BPM
EKG ATRIAL RATE: 56 BPM
EKG DIAGNOSIS: NORMAL
EKG DIAGNOSIS: NORMAL
EKG P AXIS: 35 DEGREES
EKG P AXIS: 54 DEGREES
EKG P-R INTERVAL: 186 MS
EKG P-R INTERVAL: 200 MS
EKG Q-T INTERVAL: 474 MS
EKG Q-T INTERVAL: 502 MS
EKG QRS DURATION: 106 MS
EKG QRS DURATION: 98 MS
EKG QTC CALCULATION (BAZETT): 457 MS
EKG QTC CALCULATION (BAZETT): 480 MS
EKG R AXIS: -21 DEGREES
EKG R AXIS: -29 DEGREES
EKG T AXIS: -54 DEGREES
EKG T AXIS: 70 DEGREES
EKG VENTRICULAR RATE: 55 BPM
EKG VENTRICULAR RATE: 56 BPM
GFR AFRICAN AMERICAN: >60 ML/MIN/1.73M2
GFR NON-AFRICAN AMERICAN: >60 ML/MIN/1.73M2
GLUCOSE BLD-MCNC: 118 MG/DL (ref 70–99)
INR BLD: 2.2 INDEX
MAGNESIUM: 2 MG/DL (ref 1.8–2.4)
POTASSIUM SERPL-SCNC: 4.7 MMOL/L (ref 3.5–5.1)
PROTHROMBIN TIME: 26.8 SECONDS (ref 11.7–14.5)
SODIUM BLD-SCNC: 138 MMOL/L (ref 135–145)

## 2019-12-12 PROCEDURE — 2140000000 HC CCU INTERMEDIATE R&B

## 2019-12-12 PROCEDURE — 93010 ELECTROCARDIOGRAM REPORT: CPT | Performed by: INTERNAL MEDICINE

## 2019-12-12 PROCEDURE — 83735 ASSAY OF MAGNESIUM: CPT

## 2019-12-12 PROCEDURE — 99231 SBSQ HOSP IP/OBS SF/LOW 25: CPT | Performed by: NURSE PRACTITIONER

## 2019-12-12 PROCEDURE — 36415 COLL VENOUS BLD VENIPUNCTURE: CPT

## 2019-12-12 PROCEDURE — 93005 ELECTROCARDIOGRAM TRACING: CPT | Performed by: NURSE PRACTITIONER

## 2019-12-12 PROCEDURE — 94761 N-INVAS EAR/PLS OXIMETRY MLT: CPT

## 2019-12-12 PROCEDURE — 6370000000 HC RX 637 (ALT 250 FOR IP): Performed by: INTERNAL MEDICINE

## 2019-12-12 PROCEDURE — 6370000000 HC RX 637 (ALT 250 FOR IP): Performed by: NURSE PRACTITIONER

## 2019-12-12 PROCEDURE — 80048 BASIC METABOLIC PNL TOTAL CA: CPT

## 2019-12-12 PROCEDURE — 85610 PROTHROMBIN TIME: CPT

## 2019-12-12 PROCEDURE — 2580000003 HC RX 258: Performed by: NURSE PRACTITIONER

## 2019-12-12 RX ADMIN — WARFARIN SODIUM 7.5 MG: 7.5 TABLET ORAL at 18:19

## 2019-12-12 RX ADMIN — Medication 10 ML: at 21:31

## 2019-12-12 RX ADMIN — SOTALOL HYDROCHLORIDE 80 MG: 80 TABLET ORAL at 09:15

## 2019-12-12 RX ADMIN — Medication 200 MG: at 09:15

## 2019-12-12 RX ADMIN — PAROXETINE 10 MG: 10 TABLET, FILM COATED ORAL at 09:15

## 2019-12-12 RX ADMIN — SOTALOL HYDROCHLORIDE 80 MG: 80 TABLET ORAL at 21:30

## 2019-12-12 ASSESSMENT — PAIN SCALES - GENERAL
PAINLEVEL_OUTOF10: 0
PAINLEVEL_OUTOF10: 0

## 2019-12-13 VITALS
DIASTOLIC BLOOD PRESSURE: 77 MMHG | OXYGEN SATURATION: 98 % | HEIGHT: 70 IN | HEART RATE: 56 BPM | RESPIRATION RATE: 20 BRPM | SYSTOLIC BLOOD PRESSURE: 113 MMHG | BODY MASS INDEX: 40.39 KG/M2 | TEMPERATURE: 97.5 F | WEIGHT: 282.1 LBS

## 2019-12-13 LAB
EKG ATRIAL RATE: 53 BPM
EKG ATRIAL RATE: 55 BPM
EKG ATRIAL RATE: 59 BPM
EKG DIAGNOSIS: NORMAL
EKG P AXIS: 33 DEGREES
EKG P AXIS: 35 DEGREES
EKG P AXIS: 39 DEGREES
EKG P-R INTERVAL: 188 MS
EKG P-R INTERVAL: 196 MS
EKG P-R INTERVAL: 198 MS
EKG Q-T INTERVAL: 492 MS
EKG Q-T INTERVAL: 500 MS
EKG Q-T INTERVAL: 536 MS
EKG QRS DURATION: 100 MS
EKG QRS DURATION: 102 MS
EKG QRS DURATION: 92 MS
EKG QTC CALCULATION (BAZETT): 470 MS
EKG QTC CALCULATION (BAZETT): 495 MS
EKG QTC CALCULATION (BAZETT): 502 MS
EKG R AXIS: -19 DEGREES
EKG R AXIS: -35 DEGREES
EKG R AXIS: -40 DEGREES
EKG T AXIS: -31 DEGREES
EKG T AXIS: -41 DEGREES
EKG T AXIS: -49 DEGREES
EKG VENTRICULAR RATE: 53 BPM
EKG VENTRICULAR RATE: 55 BPM
EKG VENTRICULAR RATE: 59 BPM
INR BLD: 2.18 INDEX
PROTHROMBIN TIME: 26.6 SECONDS (ref 11.7–14.5)

## 2019-12-13 PROCEDURE — 85610 PROTHROMBIN TIME: CPT

## 2019-12-13 PROCEDURE — 93005 ELECTROCARDIOGRAM TRACING: CPT | Performed by: NURSE PRACTITIONER

## 2019-12-13 PROCEDURE — 99238 HOSP IP/OBS DSCHRG MGMT 30/<: CPT | Performed by: NURSE PRACTITIONER

## 2019-12-13 PROCEDURE — 93010 ELECTROCARDIOGRAM REPORT: CPT | Performed by: INTERNAL MEDICINE

## 2019-12-13 PROCEDURE — 36415 COLL VENOUS BLD VENIPUNCTURE: CPT

## 2019-12-13 PROCEDURE — 6370000000 HC RX 637 (ALT 250 FOR IP): Performed by: NURSE PRACTITIONER

## 2019-12-13 RX ORDER — SOTALOL HYDROCHLORIDE 80 MG/1
80 TABLET ORAL 2 TIMES DAILY
Qty: 60 TABLET | Refills: 0 | Status: SHIPPED | OUTPATIENT
Start: 2019-12-13 | End: 2020-01-01 | Stop reason: SDUPTHER

## 2019-12-13 RX ORDER — SOTALOL HYDROCHLORIDE 80 MG/1
40 TABLET ORAL 2 TIMES DAILY
Status: DISCONTINUED | OUTPATIENT
Start: 2019-12-13 | End: 2019-12-13 | Stop reason: HOSPADM

## 2019-12-13 RX ADMIN — Medication 200 MG: at 09:41

## 2019-12-13 RX ADMIN — PAROXETINE 10 MG: 10 TABLET, FILM COATED ORAL at 09:40

## 2019-12-13 RX ADMIN — SOTALOL HYDROCHLORIDE 40 MG: 80 TABLET ORAL at 09:41

## 2019-12-15 ASSESSMENT — ENCOUNTER SYMPTOMS
COUGH: 0
DIARRHEA: 0
SHORTNESS OF BREATH: 0
BLOOD IN STOOL: 0
VOMITING: 0
COLOR CHANGE: 0
PHOTOPHOBIA: 0
ABDOMINAL PAIN: 0
EYE PAIN: 0
WHEEZING: 0
BACK PAIN: 0
CHEST TIGHTNESS: 0
CONSTIPATION: 0
NAUSEA: 0

## 2020-01-01 RX ORDER — SOTALOL HYDROCHLORIDE 80 MG/1
80 TABLET ORAL 2 TIMES DAILY
Qty: 60 TABLET | Refills: 0 | Status: SHIPPED | OUTPATIENT
Start: 2020-01-01 | End: 2020-02-10

## 2020-02-10 RX ORDER — SOTALOL HYDROCHLORIDE 80 MG/1
80 TABLET ORAL 2 TIMES DAILY
Qty: 60 TABLET | Refills: 5 | Status: SHIPPED | OUTPATIENT
Start: 2020-02-10 | End: 2020-08-20

## 2020-02-24 ENCOUNTER — OFFICE VISIT (OUTPATIENT)
Dept: CARDIOLOGY CLINIC | Age: 65
End: 2020-02-24
Payer: MEDICARE

## 2020-02-24 VITALS
DIASTOLIC BLOOD PRESSURE: 80 MMHG | HEIGHT: 70 IN | BODY MASS INDEX: 39.83 KG/M2 | WEIGHT: 278.2 LBS | SYSTOLIC BLOOD PRESSURE: 118 MMHG | HEART RATE: 56 BPM

## 2020-02-24 PROCEDURE — G8484 FLU IMMUNIZE NO ADMIN: HCPCS | Performed by: NURSE PRACTITIONER

## 2020-02-24 PROCEDURE — 1123F ACP DISCUSS/DSCN MKR DOCD: CPT | Performed by: NURSE PRACTITIONER

## 2020-02-24 PROCEDURE — 3017F COLORECTAL CA SCREEN DOC REV: CPT | Performed by: NURSE PRACTITIONER

## 2020-02-24 PROCEDURE — G8417 CALC BMI ABV UP PARAM F/U: HCPCS | Performed by: NURSE PRACTITIONER

## 2020-02-24 PROCEDURE — 1036F TOBACCO NON-USER: CPT | Performed by: NURSE PRACTITIONER

## 2020-02-24 PROCEDURE — 4040F PNEUMOC VAC/ADMIN/RCVD: CPT | Performed by: NURSE PRACTITIONER

## 2020-02-24 PROCEDURE — 93000 ELECTROCARDIOGRAM COMPLETE: CPT | Performed by: NURSE PRACTITIONER

## 2020-02-24 PROCEDURE — G8427 DOCREV CUR MEDS BY ELIG CLIN: HCPCS | Performed by: NURSE PRACTITIONER

## 2020-02-24 PROCEDURE — 99213 OFFICE O/P EST LOW 20 MIN: CPT | Performed by: NURSE PRACTITIONER

## 2020-02-24 NOTE — LETTER
CLINICAL STAFF DOCUMENTATION    Latasha Butler  1955  [id number]    Have you had any Chest Pain - No      Have you had any Shortness of Breath - No      Have you had any dizziness - No      Have you had any palpitations - No    Do you have any edema -  No    Do you have a surgery or procedure scheduled in the near future - No    Ask patient if they want to sign up for MyChart if they are not already signed up    Check to see if we have an E-MAIL on file for the patient    Check medication list thoroughly!!!  BE SURE TO ASK PATIENT IF THEY NEED MEDICATION REFILLS

## 2020-02-24 NOTE — PROGRESS NOTES
Electrophysiology Follow up    Reason for consultation: atrial fibrillation      Chief complaint :  Atrial fib     Referring physician: Dr. Hieu Langston      Primary care physician: Nancy Martel MD     History of Present Illness: 2/24/2020  Notes he is feeling well. Denies any SOB or palpitations. He has given up caffeine. Using CPAP and sleeping well. Has not had INR done for over a month. Previous visit on 11/27/2019   Atrial Fibrillation       Consult for AFIB. He denies any chest pain,SOB,dizziness, some swelling to legs due to past injury. Sometimes feels palpitations         Past Medical History:   Diagnosis Date    Abnormal EKG     Atrial fibrillation (HCC) 1986    Depression     Family history of coronary artery disease     Mother-MI.    H/O cardiac catheterization 11/12/2019    No significant CAD.    H/O echocardiogram 09/11/2019    EF 55-60%, Mild septal wall asymmetrical LVH.  History of exercise stress test 09/09/2019    treadmill    History of nuclear stress test 09/11/2019    This is a normal study.     Hx of Venous Doppler ultrasound 10/14/2019    No DVT or SVT, Significant reflux in RCFV, RGSV, RSSV, LCFV, LGSV    Left arm pain 09/09/2019    Non-smoker     Obesity     Sleep apnea     TIA (transient ischemic attack) 2008     Past Surgical History:   Procedure Laterality Date    ABDOMEN SURGERY  02/2019    bowel perforation repair    CHOLECYSTECTOMY      FOOT SURGERY Bilateral     LEG SURGERY  2017    compartment syndrome right leg    TONSILLECTOMY       Family History   Problem Relation Age of Onset    Heart Attack Mother     Cancer Mother         ovarian    Other Mother         dementia    Other Father         murdered    Cancer Father         prostate cancer     Social History     Tobacco Use    Smoking status: Never Smoker    Smokeless tobacco: Never Used   Substance Use Topics    Alcohol use: Not Currently        Review of Systems:   · Constitutional: No Fever,no unintentional weight Loss   · Eyes: No change in Vision:     · ENT: No Headaches, Hearing Loss or Vertigo. No tinnitus   · Cardiovascular: as per note above   · Respiratory: No cough or wheezing and as per note above. · Gastrointestinal: no abdominal pain, no appetite loss, no blood in stools, constipation, or diarrhea, No heartburn  · Genitourinary: No dysuria, trouble voiding, or hematuria  · Musculoskeletal: back pain no: myalgia No: arthralgia Yes  · Integumentary: No rash or pruritis  · Neurological: No TIA or stroke symptoms  · Psychiatric: anxiety No:  depression Yes    · Endocrine: No malaise, No fatigue no temperature intolerance  · Hematologic/Lymphatic: No bleeding problems, blood clots or swollen lymph nodes  · Allergic/Immunologic: No nasal congestion or hives        /80   Ht 5' 10\" (1.778 m)   Wt 278 lb 3.2 oz (126.2 kg)   BMI 39.92 kg/m²   Wt Readings from Last 3 Encounters:   02/24/20 278 lb 3.2 oz (126.2 kg)   12/11/19 282 lb 1.6 oz (128 kg)   11/27/19 279 lb (126.6 kg)       Physical exam:   General Appearance:  No distress, conversant  Constitutional:  Well developed, Well nourished, No acute distress, Non-toxic appearance. HENT:  Normocephalic, Atraumatic, Bilateral external ears normal, Oropharynx moist, No oral exudates, Nose normal. Neck- Normal range of motion, No tenderness, Supple, No stridor,no apical-carotid delay  Eyes:  PERRL, EOMI, Conjunctiva normal, No discharge. Respiratory:  Normal breath sounds, No respiratory distress, No wheezing, No chest tenderness. ,no use of accessory muscles, NO crackles  Cardiovascular: (PMI) apex non displaced,no lifts no thrills, S1 S2 normal. No murmur appreciated  GI:  Bowel sounds normal, Soft, No tenderness, No masses, No gross visceromegaly   :  No costovertebral angle tenderness   Musculoskeletal:  No edema, no tenderness, no deformities.    Integument:  Well hydrated, no rash   Lymphatic:  No lymphadenopathy noted   Neurologic:

## 2020-02-26 ENCOUNTER — NURSE ONLY (OUTPATIENT)
Dept: FAMILY MEDICINE CLINIC | Age: 65
End: 2020-02-26
Payer: MEDICARE

## 2020-02-26 LAB
INTERNATIONAL NORMALIZATION RATIO, POC: 2.4
PROTHROMBIN TIME, POC: NORMAL

## 2020-02-26 PROCEDURE — 85610 PROTHROMBIN TIME: CPT | Performed by: FAMILY MEDICINE

## 2020-03-04 ENCOUNTER — TELEPHONE (OUTPATIENT)
Dept: FAMILY MEDICINE CLINIC | Age: 65
End: 2020-03-04

## 2020-03-04 NOTE — TELEPHONE ENCOUNTER
H was due last month for a follow up on his chronic conditions, so that needs scheduled and we can do the tetanus then.

## 2020-03-04 NOTE — TELEPHONE ENCOUNTER
Patient stated he was mistaken and he needs a TB test. He is scheduled for his follow up on 03/10/2020 at 7:45

## 2020-03-04 NOTE — TELEPHONE ENCOUNTER
Patient sent message requesting appt for tetanus vaccine as he started a new job. Can he be scheduled for a nurse visit to have this completed?  No previous immunization history on file

## 2020-03-10 ENCOUNTER — OFFICE VISIT (OUTPATIENT)
Dept: FAMILY MEDICINE CLINIC | Age: 65
End: 2020-03-10
Payer: MEDICARE

## 2020-03-10 VITALS
OXYGEN SATURATION: 98 % | SYSTOLIC BLOOD PRESSURE: 132 MMHG | TEMPERATURE: 96.5 F | HEART RATE: 52 BPM | WEIGHT: 274.4 LBS | DIASTOLIC BLOOD PRESSURE: 78 MMHG | BODY MASS INDEX: 39.37 KG/M2

## 2020-03-10 PROCEDURE — 3017F COLORECTAL CA SCREEN DOC REV: CPT | Performed by: FAMILY MEDICINE

## 2020-03-10 PROCEDURE — 4040F PNEUMOC VAC/ADMIN/RCVD: CPT | Performed by: FAMILY MEDICINE

## 2020-03-10 PROCEDURE — 99214 OFFICE O/P EST MOD 30 MIN: CPT | Performed by: FAMILY MEDICINE

## 2020-03-10 PROCEDURE — G8427 DOCREV CUR MEDS BY ELIG CLIN: HCPCS | Performed by: FAMILY MEDICINE

## 2020-03-10 PROCEDURE — G8417 CALC BMI ABV UP PARAM F/U: HCPCS | Performed by: FAMILY MEDICINE

## 2020-03-10 PROCEDURE — 86580 TB INTRADERMAL TEST: CPT | Performed by: FAMILY MEDICINE

## 2020-03-10 PROCEDURE — G8484 FLU IMMUNIZE NO ADMIN: HCPCS | Performed by: FAMILY MEDICINE

## 2020-03-10 PROCEDURE — 1123F ACP DISCUSS/DSCN MKR DOCD: CPT | Performed by: FAMILY MEDICINE

## 2020-03-10 PROCEDURE — 1036F TOBACCO NON-USER: CPT | Performed by: FAMILY MEDICINE

## 2020-03-10 RX ORDER — PAROXETINE 10 MG/1
10 TABLET, FILM COATED ORAL EVERY MORNING
Qty: 90 TABLET | Refills: 1 | Status: SHIPPED | OUTPATIENT
Start: 2020-03-10 | End: 2020-11-24

## 2020-03-10 ASSESSMENT — ENCOUNTER SYMPTOMS
SHORTNESS OF BREATH: 0
COUGH: 0

## 2020-03-10 NOTE — PROGRESS NOTES
Subjective:      Patient ID: Sabina Mojica is a 71 y.o. male. Kd Leon is here to follow up on his chronic conditions. Hypertension   This is a chronic problem. The problem is controlled. Associated symptoms include peripheral edema. Pertinent negatives include no chest pain or shortness of breath. Past treatments include beta blockers. The current treatment provides significant improvement. There are no compliance problems. A fib  Chronic. On sotalol and coumadin. Sees cardiology. Doing well no side effects. D&A  Chronic. On Paxil. Tolerates well. Working well. No HI/SI  Sleep apnea  Chronic. On CPAP. Uses nightly. Tolerates well. Review of Systems   Respiratory: Negative for cough and shortness of breath. Cardiovascular: Negative for chest pain. Past Medical History:   Diagnosis Date    Abnormal EKG     Atrial fibrillation (HCC) 1986    Depression     Family history of coronary artery disease     Mother-MI.    H/O cardiac catheterization 11/12/2019    No significant CAD.    H/O echocardiogram 09/11/2019    EF 55-60%, Mild septal wall asymmetrical LVH.  History of exercise stress test 09/09/2019    treadmill    History of nuclear stress test 09/11/2019    This is a normal study.     Hx of Venous Doppler ultrasound 10/14/2019    No DVT or SVT, Significant reflux in RCFV, RGSV, RSSV, LCFV, LGSV    Left arm pain 09/09/2019    Non-smoker     Obesity     Sleep apnea     TIA (transient ischemic attack) 2008     Past Surgical History:   Procedure Laterality Date    ABDOMEN SURGERY  02/2019    bowel perforation repair    CHOLECYSTECTOMY      FOOT SURGERY Bilateral     LEG SURGERY  2017    compartment syndrome right leg    TONSILLECTOMY       Social History     Socioeconomic History    Marital status:      Spouse name: Not on file    Number of children: Not on file    Years of education: Not on file    Highest education level: Not on file   Occupational History    Not on

## 2020-03-17 ENCOUNTER — OFFICE VISIT (OUTPATIENT)
Dept: FAMILY MEDICINE CLINIC | Age: 65
End: 2020-03-17
Payer: MEDICARE

## 2020-03-17 PROCEDURE — 86580 TB INTRADERMAL TEST: CPT | Performed by: FAMILY MEDICINE

## 2020-03-19 ENCOUNTER — NURSE ONLY (OUTPATIENT)
Dept: FAMILY MEDICINE CLINIC | Age: 65
End: 2020-03-19

## 2020-03-19 LAB
INDURATION: NORMAL
TB SKIN TEST: NEGATIVE

## 2020-04-16 RX ORDER — WARFARIN SODIUM 7.5 MG/1
TABLET ORAL
Qty: 15 TABLET | Refills: 1 | Status: SHIPPED | OUTPATIENT
Start: 2020-04-16 | End: 2020-06-09

## 2020-04-16 RX ORDER — WARFARIN SODIUM 7.5 MG/1
TABLET ORAL
COMMUNITY
Start: 2020-04-14 | End: 2020-04-16 | Stop reason: SDUPTHER

## 2020-04-24 ENCOUNTER — TELEMEDICINE (OUTPATIENT)
Dept: CARDIOLOGY CLINIC | Age: 65
End: 2020-04-24
Payer: MEDICARE

## 2020-04-24 VITALS
DIASTOLIC BLOOD PRESSURE: 85 MMHG | WEIGHT: 265 LBS | BODY MASS INDEX: 37.94 KG/M2 | HEIGHT: 70 IN | SYSTOLIC BLOOD PRESSURE: 135 MMHG | HEART RATE: 59 BPM

## 2020-04-24 PROCEDURE — 4040F PNEUMOC VAC/ADMIN/RCVD: CPT | Performed by: NURSE PRACTITIONER

## 2020-04-24 PROCEDURE — 1123F ACP DISCUSS/DSCN MKR DOCD: CPT | Performed by: NURSE PRACTITIONER

## 2020-04-24 PROCEDURE — 99213 OFFICE O/P EST LOW 20 MIN: CPT | Performed by: NURSE PRACTITIONER

## 2020-04-24 PROCEDURE — G8427 DOCREV CUR MEDS BY ELIG CLIN: HCPCS | Performed by: NURSE PRACTITIONER

## 2020-04-24 PROCEDURE — 3017F COLORECTAL CA SCREEN DOC REV: CPT | Performed by: NURSE PRACTITIONER

## 2020-04-24 ASSESSMENT — ENCOUNTER SYMPTOMS
DIARRHEA: 0
CHEST TIGHTNESS: 0
SHORTNESS OF BREATH: 0
PHOTOPHOBIA: 0
CONSTIPATION: 0
COUGH: 0

## 2020-04-24 NOTE — PROGRESS NOTES
Never Smoker    Smokeless tobacco: Never Used   Substance Use Topics    Alcohol use: Not Currently    Drug use: Never        Allergies   Allergen Reactions    Aspirin    ,   Past Medical History:   Diagnosis Date    Abnormal EKG     Atrial fibrillation ([facility] [address] 1986    Depression     Family history of coronary artery disease     Mother-MI.    H/O cardiac catheterization 11/12/2019    No significant CAD.    H/O echocardiogram 09/11/2019    EF 55-60%, Mild septal wall asymmetrical LVH.  History of exercise stress test 09/09/2019    treadmill    History of nuclear stress test 09/11/2019    This is a normal study.     Hx of Venous Doppler ultrasound 10/14/2019    No DVT or SVT, Significant reflux in RCFV, RGSV, RSSV, LCFV, LGSV    Left arm pain 09/09/2019    Non-smoker     Obesity     Sleep apnea     TIA (transient ischemic attack) 2008   ,   Past Surgical History:   Procedure Laterality Date    ABDOMEN SURGERY  02/2019    bowel perforation repair    CHOLECYSTECTOMY      FOOT SURGERY Bilateral     LEG SURGERY  2017    compartment syndrome right leg    TONSILLECTOMY     ,   Social History     Tobacco Use    Smoking status: Never Smoker    Smokeless tobacco: Never Used   Substance Use Topics    Alcohol use: Not Currently    Drug use: Never   ,   Family History   Problem Relation Age of Onset    Heart Attack Mother     Cancer Mother         ovarian    Other Mother         dementia    Other Father         murdered    Cancer Father         prostate cancer   ,   Immunization History   Administered Date(s) Administered    PPD Test 03/10/2020, 03/17/2020       PHYSICAL EXAMINATION:  [ INSTRUCTIONS:  \"[x]\" Indicates a positive item  \"[]\" Indicates a negative item  -- DELETE ALL ITEMS NOT EXAMINED]  Vital Signs: (As obtained by patient/caregiver or practitioner observation)    Vitals:    04/24/20 0827   BP: 135/85   Pulse: 59       Constitutional: [x] Appears well-developed and well-nourished [x] No apparent distress      [] Abnormal-   Mental status  [x] Alert and awake  [x] Oriented to person/place/time []Able to follow commands      Eyes:  EOM    [x]  Normal  [] Abnormal-  Sclera  [x]  Normal  [] Abnormal -         Discharge [x]  None visible  [] Abnormal -    HENT:   [x] Normocephalic, atraumatic. [] Abnormal   [x] Mouth/Throat: Mucous membranes are moist.     External Ears [x] Normal  [] Abnormal-     Neck: [x] No visualized mass     Pulmonary/Chest: [x] Respiratory effort normal.  [x] No visualized signs of difficulty breathing or respiratory distress        [] Abnormal-     Neurological:        [x] No Facial Asymmetry (Cranial nerve 7 motor function) (limited exam to video visit)          [x] No gaze palsy        [] Abnormal-         Skin:        [x] No significant exanthematous lesions or discoloration noted on facial skin         [] Abnormal-            Psychiatric:       [x] Normal Affect [x] No Hallucinations        [] Abnormal-     Other pertinent observable physical exam findings-     Due to this being a TeleHealth encounter, evaluation of the following organ systems is limited: Vitals/Constitutional/EENT/Resp/CV/GI//MS/Neuro/Skin/Heme-Lymph-Imm. ASSESSMENT/PLAN:    Sleep apnea   Patient has a CPAP machine   Patient does wear it every night   Patient does sleep well.  Pulmonology manages      Atrial fibrillation ([address]   Rate controlled yes.  Chadsvasc score is 4   He is  on anticoagulation.    Continue Sotalol 80mg BID coumadin and Mag    [id number] Score for Atrial Fibrillation Stroke Risk   Risk   Factors  Component Value   C CHF No 0   H HTN Yes 1   A2 Age >= 76 No,  (65 y.o.) 0   D DM No 0   S2 Prior Stroke/TIA Yes 2   V Vascular Disease No 0   A Age 74-69 Yes,  (65 y.o.) 1   Sc Sex male 0    [id number]  Score  4   Score last updated 4/24/20 3:56 AM EDT    Click here for a link to the UpToDate guideline \"Atrial Fibrillation: Anticoagulation therapy to prevent embolization    Disclaimer: Risk Score calculation is dependent on accuracy of patient problem list and past encounter diagnosis. Essential hypertension   Controlled   To continue Beta blocker, Calcium channel blocker, ACE, ARB, Vasodilator, Diuretic   advised low salt diet   Last echo 9/2019 Summary   Left ventricular systolic function is normal with an ejection fraction of 55-60%. Mild septal wall asymmetrical left ventricular hypertrophy. The left atrium is mildly dilated. No significant valvular disease noted. No evidence of pericardial effusion. Follow up with Dr. Alli Palma in person in 3 months  Follow up with Dr. Hayley Blancas may be tele/video visit in 6 months   Follow up may be sooner or all in person should problems arise. An  electronic signature was used to authenticate this note. --INES Banegas CNP on 4/24/2020 at 8:37 AM9}    I confirm that this visit was completed in a telehealth setting ,using synchronous audiovisual technology for real time patient interaction . The patient identity with name and date of birth was confirmed . This evaluation of patient was done by telehealth in the setting of INRMS-38 Public health emergency , which precluded assurance of safe in person visit at the time of service. The patient consented to and accepts potential risks associated with telemedical evaluation and care was taken to assess cb presence of any medical issues that would be more  appropriate for expedited in -person care. Pursuant to the emergency declaration under the [facility], [facility] waiver authority and the [employer] General Act, this Virtual  Visit was conducted, with patient's consent, to reduce the patient's risk of exposure to COVID-19 and provide continuity of care for an established patient.     Services were provided through a video synchronous discussion virtually to substitute for in-person clinic visit. I Affirm this is a Patient Initiated Episode with an Established Patient who has not had a related appointment within my department in the past 7 days or scheduled within the next 24 hours.     Total Time: minutes: 11-20 minutes

## 2020-04-24 NOTE — ASSESSMENT & PLAN NOTE
 Patient has a CPAP machine   Patient does wear it every night   Patient does sleep well.   Tammy Samuel Pulmonology manages

## 2020-04-24 NOTE — LETTER
CLINICAL STAFF DOCUMENTATION      Creed   1955  [id number]    Have you had any Chest Pain - No      Have you had any Shortness of Breath - No    Have you had any dizziness - No    Have you had any palpitations - No    Do you have any edema - No    Do you have a surgery or procedure scheduled in the near future - No

## 2020-05-27 ENCOUNTER — NURSE ONLY (OUTPATIENT)
Dept: FAMILY MEDICINE CLINIC | Age: 65
End: 2020-05-27
Payer: MEDICARE

## 2020-05-27 LAB
INTERNATIONAL NORMALIZATION RATIO, POC: 2
PROTHROMBIN TIME, POC: NORMAL

## 2020-05-27 PROCEDURE — 85610 PROTHROMBIN TIME: CPT | Performed by: FAMILY MEDICINE

## 2020-06-18 ENCOUNTER — TELEPHONE (OUTPATIENT)
Dept: CARDIOLOGY CLINIC | Age: 65
End: 2020-06-18

## 2020-06-19 ENCOUNTER — OFFICE VISIT (OUTPATIENT)
Dept: CARDIOLOGY CLINIC | Age: 65
End: 2020-06-19
Payer: MEDICARE

## 2020-06-19 VITALS
BODY MASS INDEX: 37.56 KG/M2 | DIASTOLIC BLOOD PRESSURE: 90 MMHG | HEIGHT: 70 IN | HEART RATE: 54 BPM | SYSTOLIC BLOOD PRESSURE: 120 MMHG | OXYGEN SATURATION: 98 % | WEIGHT: 262.4 LBS

## 2020-06-19 PROCEDURE — 1123F ACP DISCUSS/DSCN MKR DOCD: CPT | Performed by: INTERNAL MEDICINE

## 2020-06-19 PROCEDURE — G8417 CALC BMI ABV UP PARAM F/U: HCPCS | Performed by: INTERNAL MEDICINE

## 2020-06-19 PROCEDURE — 99213 OFFICE O/P EST LOW 20 MIN: CPT | Performed by: INTERNAL MEDICINE

## 2020-06-19 PROCEDURE — G8427 DOCREV CUR MEDS BY ELIG CLIN: HCPCS | Performed by: INTERNAL MEDICINE

## 2020-06-19 PROCEDURE — 1036F TOBACCO NON-USER: CPT | Performed by: INTERNAL MEDICINE

## 2020-06-19 PROCEDURE — 93000 ELECTROCARDIOGRAM COMPLETE: CPT | Performed by: INTERNAL MEDICINE

## 2020-06-19 PROCEDURE — 3017F COLORECTAL CA SCREEN DOC REV: CPT | Performed by: INTERNAL MEDICINE

## 2020-06-19 PROCEDURE — 4040F PNEUMOC VAC/ADMIN/RCVD: CPT | Performed by: INTERNAL MEDICINE

## 2020-06-19 ASSESSMENT — ENCOUNTER SYMPTOMS
SHORTNESS OF BREATH: 0
CHEST TIGHTNESS: 0
VOMITING: 0
COLOR CHANGE: 0
CONSTIPATION: 0
DIARRHEA: 0
PHOTOPHOBIA: 0
WHEEZING: 0
ABDOMINAL PAIN: 0
EYE PAIN: 0
COUGH: 0
BACK PAIN: 0
NAUSEA: 0
BLOOD IN STOOL: 0

## 2020-06-19 NOTE — PROGRESS NOTES
Electrophysiology FU Note      Reason for consultation:  Atrial fibrillation    Chief complaint :  Palpitations    Referring physician:       Primary care physician: Ashia Judd MD      History of Present Illness: This visit (6/19/2020)      Chief Complaint   Patient presents with    Atrial Fibrillation     Patient denies chest pain, palpitations, dizziness, swelling, shortness of breath. Patient c/o continous feeling of fatigue. Patient states his blood pressure has been running low. Patient denies alcohol, caffiene. Previous visit:    Chief Complaint   Patient presents with    Atrial Fibrillation     Consult for AFIB. He denies any chest pain,SOB,dizziness, some swelling to legs due to past injury. Sometimes feels palpitations. Pastmedical history:   Past Medical History:   Diagnosis Date    Abnormal EKG     Atrial fibrillation ([facility]) 1986    Depression     Family history of coronary artery disease     Mother-MI.    H/O cardiac catheterization 11/12/2019    No significant CAD.    H/O echocardiogram 09/11/2019    EF 55-60%, Mild septal wall asymmetrical LVH.  History of exercise stress test 09/09/2019    treadmill    History of nuclear stress test 09/11/2019    This is a normal study.     Hx of Venous Doppler ultrasound 10/14/2019    No DVT or SVT, Significant reflux in RCFV, RGSV, RSSV, LCFV, LGSV    Left arm pain 09/09/2019    Non-smoker     Obesity     Sleep apnea     TIA (transient ischemic attack) 2008       Surgical history :   Past Surgical History:   Procedure Laterality Date    ABDOMEN SURGERY  02/2019    bowel perforation repair    CHOLECYSTECTOMY      FOOT SURGERY Bilateral     LEG SURGERY  2017    compartment syndrome right leg    TONSILLECTOMY         Family history:   Family History   Problem Relation Age of Onset    Heart Attack Mother     Cancer Mother         ovarian    Other Mother dementia    Other Father         murdered    Cancer Father         prostate cancer       Social history :  reports that he has never smoked. He has never used smokeless tobacco. He reports previous alcohol use. He reports that he does not use drugs. Allergies   Allergen Reactions    Aspirin        Current Outpatient Medications on File Prior to Visit   Medication Sig Dispense Refill    warfarin (COUMADIN) 7.5 MG tablet TAKE 1 TABLET BY MOUTH EVERY OTHER DAY 45 tablet 3    PARoxetine (PAXIL) 10 MG tablet Take 1 tablet by mouth every morning 90 tablet 1    sotalol (BETAPACE) 80 MG tablet Take 1 tablet by mouth 2 times daily 60 tablet 5    warfarin (COUMADIN) 6 MG tablet TAKE 1 TABLET BY MOUTH EVERY OTHER DAY 90 tablet 1    magnesium oxide (MAG-OX) 400 (241.3 Mg) MG TABS tablet Take 0.5 tablets by mouth daily 30 tablet 0     No current facility-administered medications on file prior to visit. Review of Systems:   Review of Systems   Constitutional: Positive for fatigue. Negative for activity change, chills and fever. HENT: Negative for congestion, ear pain and tinnitus. Eyes: Negative for photophobia, pain and visual disturbance. Respiratory: Negative for cough, chest tightness, shortness of breath and wheezing. Cardiovascular: Negative for chest pain, palpitations and leg swelling. Gastrointestinal: Negative for abdominal pain, blood in stool, constipation, diarrhea, nausea and vomiting. Endocrine: Negative for cold intolerance and heat intolerance. Genitourinary: Negative for dysuria, flank pain and hematuria. Musculoskeletal: Negative for arthralgias, back pain, myalgias and neck stiffness. Skin: Negative for color change and rash. Allergic/Immunologic: Negative for food allergies. Neurological: Negative for dizziness, light-headedness, numbness and headaches. Hematological: Does not bruise/bleed easily.    Psychiatric/Behavioral: Negative for agitation, behavioral problems

## 2020-06-26 ENCOUNTER — TELEPHONE (OUTPATIENT)
Dept: CARDIOLOGY CLINIC | Age: 65
End: 2020-06-26

## 2020-06-26 NOTE — TELEPHONE ENCOUNTER
Patient called stated that dr Madeleine Kruse ordered a monitor   That has not arrived as of today , He will be going out  Of town 6/30 and will be gone for 3 wks , He was hoping   To have had it applied before then

## 2020-06-26 NOTE — TELEPHONE ENCOUNTER
Called Florian. Per Florian patients monitor is still pending at the shipping department. She stated she will send an email to see if patients monitor can be expedited. She stated that they are on back log right now but will hopefully be able to get it to patient quickly. Called patient to advise and apologized for delay.

## 2020-06-29 ENCOUNTER — NURSE ONLY (OUTPATIENT)
Dept: CARDIOLOGY CLINIC | Age: 65
End: 2020-06-29
Payer: MEDICARE

## 2020-07-06 NOTE — PROGRESS NOTES
30 days e-cardio monitor placed. SN # Hooked up at home. . Instructed patient on how to record the event and to call monitoring center at [phone] if any problems arise. Instructed patient to disconnect the lead wires from the electrodes before bathing or showering and reattach them afterwards. Instructed patient that the electrodes should be changed every 3 days or if they no longer adhere to the skin. Patient to mail package after the monitor has ended. Patient verbalized understanding.

## 2020-07-16 ENCOUNTER — TELEPHONE (OUTPATIENT)
Dept: CARDIOLOGY CLINIC | Age: 65
End: 2020-07-16

## 2020-08-06 ENCOUNTER — APPOINTMENT (OUTPATIENT)
Dept: GENERAL RADIOLOGY | Age: 65
DRG: 853 | End: 2020-08-06
Payer: MEDICARE

## 2020-08-06 ENCOUNTER — APPOINTMENT (OUTPATIENT)
Dept: CT IMAGING | Age: 65
DRG: 853 | End: 2020-08-06
Payer: MEDICARE

## 2020-08-06 ENCOUNTER — ANESTHESIA EVENT (OUTPATIENT)
Dept: OPERATING ROOM | Age: 65
DRG: 853 | End: 2020-08-06
Payer: MEDICARE

## 2020-08-06 ENCOUNTER — ANESTHESIA (OUTPATIENT)
Dept: OPERATING ROOM | Age: 65
DRG: 853 | End: 2020-08-06
Payer: MEDICARE

## 2020-08-06 ENCOUNTER — HOSPITAL ENCOUNTER (INPATIENT)
Age: 65
LOS: 4 days | Discharge: HOME | DRG: 853 | End: 2020-08-10
Attending: INTERNAL MEDICINE | Admitting: INTERNAL MEDICINE
Payer: MEDICARE

## 2020-08-06 VITALS
OXYGEN SATURATION: 87 % | SYSTOLIC BLOOD PRESSURE: 157 MMHG | DIASTOLIC BLOOD PRESSURE: 90 MMHG | TEMPERATURE: 98.6 F | RESPIRATION RATE: 29 BRPM

## 2020-08-06 PROBLEM — K63.1 BOWEL PERFORATION (HCC): Status: ACTIVE | Noted: 2020-08-06

## 2020-08-06 LAB
ABO/RH: NORMAL
ALBUMIN SERPL-MCNC: 4.3 GM/DL (ref 3.4–5)
ALP BLD-CCNC: 112 IU/L (ref 40–128)
ALT SERPL-CCNC: 24 U/L (ref 10–40)
ANION GAP SERPL CALCULATED.3IONS-SCNC: 9 MMOL/L (ref 4–16)
ANTIBODY SCREEN: NEGATIVE
AST SERPL-CCNC: 16 IU/L (ref 15–37)
BACTERIA: NEGATIVE /HPF
BASOPHILS ABSOLUTE: 0 K/CU MM
BASOPHILS RELATIVE PERCENT: 0.3 % (ref 0–1)
BILIRUB SERPL-MCNC: 0.5 MG/DL (ref 0–1)
BILIRUBIN URINE: NEGATIVE MG/DL
BLOOD, URINE: NEGATIVE
BUN BLDV-MCNC: 17 MG/DL (ref 6–23)
CALCIUM SERPL-MCNC: 9.8 MG/DL (ref 8.3–10.6)
CHLORIDE BLD-SCNC: 100 MMOL/L (ref 99–110)
CLARITY: CLEAR
CO2: 29 MMOL/L (ref 21–32)
COLOR: YELLOW
CREAT SERPL-MCNC: 0.9 MG/DL (ref 0.9–1.3)
DIFFERENTIAL TYPE: ABNORMAL
EOSINOPHILS ABSOLUTE: 0.2 K/CU MM
EOSINOPHILS RELATIVE PERCENT: 1.3 % (ref 0–3)
GFR AFRICAN AMERICAN: >60 ML/MIN/1.73M2
GFR NON-AFRICAN AMERICAN: >60 ML/MIN/1.73M2
GLUCOSE BLD-MCNC: 127 MG/DL (ref 70–99)
GLUCOSE, URINE: NEGATIVE MG/DL
HCT VFR BLD CALC: 52.7 % (ref 42–52)
HEMOGLOBIN: 17.8 GM/DL (ref 13.5–18)
IMMATURE NEUTROPHIL %: 0.3 % (ref 0–0.43)
INR BLD: 2.21 INDEX
INR BLD: 2.75 INDEX
KETONES, URINE: NEGATIVE MG/DL
LEUKOCYTE ESTERASE, URINE: NEGATIVE
LIPASE: 47 IU/L (ref 13–60)
LYMPHOCYTES ABSOLUTE: 1 K/CU MM
LYMPHOCYTES RELATIVE PERCENT: 8 % (ref 24–44)
MCH RBC QN AUTO: 32.7 PG (ref 27–31)
MCHC RBC AUTO-ENTMCNC: 33.8 % (ref 32–36)
MCV RBC AUTO: 96.7 FL (ref 78–100)
MONOCYTES ABSOLUTE: 1 K/CU MM
MONOCYTES RELATIVE PERCENT: 7.9 % (ref 0–4)
MUCUS: ABNORMAL HPF
NITRITE URINE, QUANTITATIVE: NEGATIVE
NUCLEATED RBC %: 0 %
PDW BLD-RTO: 13.4 % (ref 11.7–14.9)
PH, URINE: 5 (ref 5–8)
PLATELET # BLD: 196 K/CU MM (ref 140–440)
PMV BLD AUTO: 10.7 FL (ref 7.5–11.1)
POTASSIUM SERPL-SCNC: 4.6 MMOL/L (ref 3.5–5.1)
PROTEIN UA: NEGATIVE MG/DL
PROTHROMBIN TIME: 27 SECONDS (ref 11.7–14.5)
PROTHROMBIN TIME: 33.6 SECONDS (ref 11.7–14.5)
RBC # BLD: 5.45 M/CU MM (ref 4.6–6.2)
RBC URINE: <1 /HPF (ref 0–3)
SEGMENTED NEUTROPHILS ABSOLUTE COUNT: 10.7 K/CU MM
SEGMENTED NEUTROPHILS RELATIVE PERCENT: 82.2 % (ref 36–66)
SODIUM BLD-SCNC: 138 MMOL/L (ref 135–145)
SPECIFIC GRAVITY UA: 1.02 (ref 1–1.03)
SQUAMOUS EPITHELIAL: <1 /HPF
TOTAL IMMATURE NEUTOROPHIL: 0.04 K/CU MM
TOTAL NUCLEATED RBC: 0 K/CU MM
TOTAL PROTEIN: 7.5 GM/DL (ref 6.4–8.2)
TRICHOMONAS: ABNORMAL /HPF
UROBILINOGEN, URINE: NORMAL MG/DL (ref 0.2–1)
WBC # BLD: 13 K/CU MM (ref 4–10.5)
WBC UA: <1 /HPF (ref 0–2)

## 2020-08-06 PROCEDURE — 2500000003 HC RX 250 WO HCPCS: Performed by: NURSE ANESTHETIST, CERTIFIED REGISTERED

## 2020-08-06 PROCEDURE — 96375 TX/PRO/DX INJ NEW DRUG ADDON: CPT

## 2020-08-06 PROCEDURE — 96376 TX/PRO/DX INJ SAME DRUG ADON: CPT

## 2020-08-06 PROCEDURE — 6360000002 HC RX W HCPCS: Performed by: NURSE PRACTITIONER

## 2020-08-06 PROCEDURE — 85025 COMPLETE CBC W/AUTO DIFF WBC: CPT

## 2020-08-06 PROCEDURE — 3600000004 HC SURGERY LEVEL 4 BASE: Performed by: SURGERY

## 2020-08-06 PROCEDURE — 3700000001 HC ADD 15 MINUTES (ANESTHESIA): Performed by: SURGERY

## 2020-08-06 PROCEDURE — 99221 1ST HOSP IP/OBS SF/LOW 40: CPT | Performed by: SURGERY

## 2020-08-06 PROCEDURE — 2580000003 HC RX 258: Performed by: NURSE PRACTITIONER

## 2020-08-06 PROCEDURE — 36430 TRANSFUSION BLD/BLD COMPNT: CPT

## 2020-08-06 PROCEDURE — 36415 COLL VENOUS BLD VENIPUNCTURE: CPT

## 2020-08-06 PROCEDURE — 7100000000 HC PACU RECOVERY - FIRST 15 MIN: Performed by: SURGERY

## 2020-08-06 PROCEDURE — 6360000002 HC RX W HCPCS: Performed by: SURGERY

## 2020-08-06 PROCEDURE — 74019 RADEX ABDOMEN 2 VIEWS: CPT

## 2020-08-06 PROCEDURE — 80053 COMPREHEN METABOLIC PANEL: CPT

## 2020-08-06 PROCEDURE — P9017 PLASMA 1 DONOR FRZ W/IN 8 HR: HCPCS

## 2020-08-06 PROCEDURE — 6360000002 HC RX W HCPCS: Performed by: PHYSICIAN ASSISTANT

## 2020-08-06 PROCEDURE — 3700000000 HC ANESTHESIA ATTENDED CARE: Performed by: SURGERY

## 2020-08-06 PROCEDURE — 0DN80ZZ RELEASE SMALL INTESTINE, OPEN APPROACH: ICD-10-PCS | Performed by: SURGERY

## 2020-08-06 PROCEDURE — 2580000003 HC RX 258: Performed by: SURGERY

## 2020-08-06 PROCEDURE — 86901 BLOOD TYPING SEROLOGIC RH(D): CPT

## 2020-08-06 PROCEDURE — 85610 PROTHROMBIN TIME: CPT

## 2020-08-06 PROCEDURE — 99285 EMERGENCY DEPT VISIT HI MDM: CPT

## 2020-08-06 PROCEDURE — 6360000004 HC RX CONTRAST MEDICATION: Performed by: PHYSICIAN ASSISTANT

## 2020-08-06 PROCEDURE — 2720000010 HC SURG SUPPLY STERILE: Performed by: SURGERY

## 2020-08-06 PROCEDURE — 88307 TISSUE EXAM BY PATHOLOGIST: CPT

## 2020-08-06 PROCEDURE — 44120 REMOVAL OF SMALL INTESTINE: CPT | Performed by: SURGERY

## 2020-08-06 PROCEDURE — 86850 RBC ANTIBODY SCREEN: CPT

## 2020-08-06 PROCEDURE — 0DB80ZZ EXCISION OF SMALL INTESTINE, OPEN APPROACH: ICD-10-PCS | Performed by: SURGERY

## 2020-08-06 PROCEDURE — 86900 BLOOD TYPING SEROLOGIC ABO: CPT

## 2020-08-06 PROCEDURE — 2500000003 HC RX 250 WO HCPCS: Performed by: NURSE PRACTITIONER

## 2020-08-06 PROCEDURE — 2709999900 HC NON-CHARGEABLE SUPPLY: Performed by: SURGERY

## 2020-08-06 PROCEDURE — 1200000000 HC SEMI PRIVATE

## 2020-08-06 PROCEDURE — 97607 NEG PRS WND THR NDME<=50SQCM: CPT | Performed by: SURGERY

## 2020-08-06 PROCEDURE — 86927 PLASMA FRESH FROZEN: CPT

## 2020-08-06 PROCEDURE — 6360000002 HC RX W HCPCS: Performed by: NURSE ANESTHETIST, CERTIFIED REGISTERED

## 2020-08-06 PROCEDURE — 83690 ASSAY OF LIPASE: CPT

## 2020-08-06 PROCEDURE — 74177 CT ABD & PELVIS W/CONTRAST: CPT

## 2020-08-06 PROCEDURE — 7100000001 HC PACU RECOVERY - ADDTL 15 MIN: Performed by: SURGERY

## 2020-08-06 PROCEDURE — 81001 URINALYSIS AUTO W/SCOPE: CPT

## 2020-08-06 PROCEDURE — 2580000003 HC RX 258: Performed by: PHYSICIAN ASSISTANT

## 2020-08-06 PROCEDURE — 3600000014 HC SURGERY LEVEL 4 ADDTL 15MIN: Performed by: SURGERY

## 2020-08-06 PROCEDURE — 96365 THER/PROPH/DIAG IV INF INIT: CPT

## 2020-08-06 RX ORDER — POLYETHYLENE GLYCOL 3350 17 G/17G
17 POWDER, FOR SOLUTION ORAL DAILY PRN
Status: DISCONTINUED | OUTPATIENT
Start: 2020-08-06 | End: 2020-08-10 | Stop reason: HOSPADM

## 2020-08-06 RX ORDER — KETAMINE HYDROCHLORIDE 10 MG/ML
INJECTION, SOLUTION INTRAMUSCULAR; INTRAVENOUS PRN
Status: DISCONTINUED | OUTPATIENT
Start: 2020-08-06 | End: 2020-08-06 | Stop reason: SDUPTHER

## 2020-08-06 RX ORDER — PHYTONADIONE 10 MG/ML
10 INJECTION, EMULSION INTRAMUSCULAR; INTRAVENOUS; SUBCUTANEOUS ONCE
Status: DISCONTINUED | OUTPATIENT
Start: 2020-08-06 | End: 2020-08-06 | Stop reason: SDUPTHER

## 2020-08-06 RX ORDER — ACETAMINOPHEN 325 MG/1
650 TABLET ORAL EVERY 6 HOURS PRN
Status: DISCONTINUED | OUTPATIENT
Start: 2020-08-06 | End: 2020-08-10 | Stop reason: HOSPADM

## 2020-08-06 RX ORDER — PROPOFOL 10 MG/ML
INJECTION, EMULSION INTRAVENOUS PRN
Status: DISCONTINUED | OUTPATIENT
Start: 2020-08-06 | End: 2020-08-06 | Stop reason: SDUPTHER

## 2020-08-06 RX ORDER — HYDROMORPHONE HCL 110MG/55ML
0.25 PATIENT CONTROLLED ANALGESIA SYRINGE INTRAVENOUS EVERY 5 MIN PRN
Status: DISCONTINUED | OUTPATIENT
Start: 2020-08-06 | End: 2020-08-06 | Stop reason: HOSPADM

## 2020-08-06 RX ORDER — MAGNESIUM HYDROXIDE 1200 MG/15ML
LIQUID ORAL
Status: COMPLETED | OUTPATIENT
Start: 2020-08-06 | End: 2020-08-06

## 2020-08-06 RX ORDER — SODIUM CHLORIDE 0.9 % (FLUSH) 0.9 %
10 SYRINGE (ML) INJECTION EVERY 12 HOURS SCHEDULED
Status: DISCONTINUED | OUTPATIENT
Start: 2020-08-06 | End: 2020-08-10 | Stop reason: HOSPADM

## 2020-08-06 RX ORDER — MORPHINE SULFATE 4 MG/ML
4 INJECTION, SOLUTION INTRAMUSCULAR; INTRAVENOUS
Status: DISCONTINUED | OUTPATIENT
Start: 2020-08-06 | End: 2020-08-10 | Stop reason: HOSPADM

## 2020-08-06 RX ORDER — FENTANYL CITRATE 50 UG/ML
INJECTION, SOLUTION INTRAMUSCULAR; INTRAVENOUS PRN
Status: DISCONTINUED | OUTPATIENT
Start: 2020-08-06 | End: 2020-08-06 | Stop reason: SDUPTHER

## 2020-08-06 RX ORDER — PROMETHAZINE HYDROCHLORIDE 25 MG/ML
6.25 INJECTION, SOLUTION INTRAMUSCULAR; INTRAVENOUS
Status: DISCONTINUED | OUTPATIENT
Start: 2020-08-06 | End: 2020-08-06 | Stop reason: HOSPADM

## 2020-08-06 RX ORDER — ACETAMINOPHEN 650 MG/1
650 SUPPOSITORY RECTAL EVERY 6 HOURS PRN
Status: DISCONTINUED | OUTPATIENT
Start: 2020-08-06 | End: 2020-08-10 | Stop reason: HOSPADM

## 2020-08-06 RX ORDER — MAGNESIUM HYDROXIDE 1200 MG/15ML
LIQUID ORAL CONTINUOUS PRN
Status: COMPLETED | OUTPATIENT
Start: 2020-08-06 | End: 2020-08-06

## 2020-08-06 RX ORDER — LABETALOL HYDROCHLORIDE 5 MG/ML
5 INJECTION, SOLUTION INTRAVENOUS EVERY 10 MIN PRN
Status: DISCONTINUED | OUTPATIENT
Start: 2020-08-06 | End: 2020-08-06 | Stop reason: HOSPADM

## 2020-08-06 RX ORDER — MORPHINE SULFATE 2 MG/ML
2 INJECTION, SOLUTION INTRAMUSCULAR; INTRAVENOUS
Status: DISCONTINUED | OUTPATIENT
Start: 2020-08-06 | End: 2020-08-10 | Stop reason: HOSPADM

## 2020-08-06 RX ORDER — PAROXETINE HYDROCHLORIDE 20 MG/1
10 TABLET, FILM COATED ORAL EVERY MORNING
Status: DISCONTINUED | OUTPATIENT
Start: 2020-08-07 | End: 2020-08-10 | Stop reason: HOSPADM

## 2020-08-06 RX ORDER — LIDOCAINE HYDROCHLORIDE 20 MG/ML
INJECTION, SOLUTION INTRAVENOUS PRN
Status: DISCONTINUED | OUTPATIENT
Start: 2020-08-06 | End: 2020-08-06 | Stop reason: SDUPTHER

## 2020-08-06 RX ORDER — VECURONIUM BROMIDE 1 MG/ML
INJECTION, POWDER, LYOPHILIZED, FOR SOLUTION INTRAVENOUS PRN
Status: DISCONTINUED | OUTPATIENT
Start: 2020-08-06 | End: 2020-08-06 | Stop reason: SDUPTHER

## 2020-08-06 RX ORDER — SODIUM CHLORIDE 9 MG/ML
INJECTION, SOLUTION INTRAVENOUS CONTINUOUS
Status: DISCONTINUED | OUTPATIENT
Start: 2020-08-06 | End: 2020-08-09

## 2020-08-06 RX ORDER — HYDROMORPHONE HCL 110MG/55ML
0.5 PATIENT CONTROLLED ANALGESIA SYRINGE INTRAVENOUS EVERY 5 MIN PRN
Status: DISCONTINUED | OUTPATIENT
Start: 2020-08-06 | End: 2020-08-06 | Stop reason: HOSPADM

## 2020-08-06 RX ORDER — HYDRALAZINE HYDROCHLORIDE 20 MG/ML
5 INJECTION INTRAMUSCULAR; INTRAVENOUS EVERY 10 MIN PRN
Status: DISCONTINUED | OUTPATIENT
Start: 2020-08-06 | End: 2020-08-06 | Stop reason: HOSPADM

## 2020-08-06 RX ORDER — ONDANSETRON 2 MG/ML
4 INJECTION INTRAMUSCULAR; INTRAVENOUS ONCE
Status: COMPLETED | OUTPATIENT
Start: 2020-08-06 | End: 2020-08-06

## 2020-08-06 RX ORDER — FENTANYL CITRATE 50 UG/ML
50 INJECTION, SOLUTION INTRAMUSCULAR; INTRAVENOUS EVERY 5 MIN PRN
Status: DISCONTINUED | OUTPATIENT
Start: 2020-08-06 | End: 2020-08-06 | Stop reason: HOSPADM

## 2020-08-06 RX ORDER — HYDROMORPHONE HCL 110MG/55ML
PATIENT CONTROLLED ANALGESIA SYRINGE INTRAVENOUS PRN
Status: DISCONTINUED | OUTPATIENT
Start: 2020-08-06 | End: 2020-08-06 | Stop reason: SDUPTHER

## 2020-08-06 RX ORDER — SOTALOL HYDROCHLORIDE 80 MG/1
80 TABLET ORAL 2 TIMES DAILY
Status: DISCONTINUED | OUTPATIENT
Start: 2020-08-07 | End: 2020-08-10 | Stop reason: HOSPADM

## 2020-08-06 RX ORDER — SODIUM CHLORIDE 9 MG/ML
INJECTION, SOLUTION INTRAVENOUS CONTINUOUS
Status: DISCONTINUED | OUTPATIENT
Start: 2020-08-06 | End: 2020-08-10 | Stop reason: HOSPADM

## 2020-08-06 RX ORDER — 0.9 % SODIUM CHLORIDE 0.9 %
20 INTRAVENOUS SOLUTION INTRAVENOUS ONCE
Status: DISCONTINUED | OUTPATIENT
Start: 2020-08-06 | End: 2020-08-10 | Stop reason: HOSPADM

## 2020-08-06 RX ORDER — ONDANSETRON 2 MG/ML
INJECTION INTRAMUSCULAR; INTRAVENOUS PRN
Status: DISCONTINUED | OUTPATIENT
Start: 2020-08-06 | End: 2020-08-06 | Stop reason: SDUPTHER

## 2020-08-06 RX ORDER — MORPHINE SULFATE 4 MG/ML
4 INJECTION, SOLUTION INTRAMUSCULAR; INTRAVENOUS
Status: DISCONTINUED | OUTPATIENT
Start: 2020-08-06 | End: 2020-08-06 | Stop reason: SDUPTHER

## 2020-08-06 RX ORDER — FENTANYL CITRATE 50 UG/ML
25 INJECTION, SOLUTION INTRAMUSCULAR; INTRAVENOUS EVERY 5 MIN PRN
Status: DISCONTINUED | OUTPATIENT
Start: 2020-08-06 | End: 2020-08-06 | Stop reason: HOSPADM

## 2020-08-06 RX ORDER — SODIUM CHLORIDE 0.9 % (FLUSH) 0.9 %
10 SYRINGE (ML) INJECTION PRN
Status: DISCONTINUED | OUTPATIENT
Start: 2020-08-06 | End: 2020-08-10 | Stop reason: HOSPADM

## 2020-08-06 RX ORDER — DEXAMETHASONE SODIUM PHOSPHATE 4 MG/ML
INJECTION, SOLUTION INTRA-ARTICULAR; INTRALESIONAL; INTRAMUSCULAR; INTRAVENOUS; SOFT TISSUE PRN
Status: DISCONTINUED | OUTPATIENT
Start: 2020-08-06 | End: 2020-08-06 | Stop reason: SDUPTHER

## 2020-08-06 RX ORDER — ROCURONIUM BROMIDE 10 MG/ML
INJECTION, SOLUTION INTRAVENOUS PRN
Status: DISCONTINUED | OUTPATIENT
Start: 2020-08-06 | End: 2020-08-06 | Stop reason: SDUPTHER

## 2020-08-06 RX ORDER — ONDANSETRON 2 MG/ML
4 INJECTION INTRAMUSCULAR; INTRAVENOUS EVERY 6 HOURS PRN
Status: DISCONTINUED | OUTPATIENT
Start: 2020-08-06 | End: 2020-08-10 | Stop reason: HOSPADM

## 2020-08-06 RX ADMIN — LIDOCAINE HYDROCHLORIDE 100 MG: 20 INJECTION, SOLUTION INTRAVENOUS at 17:54

## 2020-08-06 RX ADMIN — ONDANSETRON 4 MG: 2 INJECTION INTRAMUSCULAR; INTRAVENOUS at 17:54

## 2020-08-06 RX ADMIN — HYDROMORPHONE HYDROCHLORIDE 0.4 MG: 2 INJECTION INTRAMUSCULAR; INTRAVENOUS; SUBCUTANEOUS at 19:40

## 2020-08-06 RX ADMIN — HYDROMORPHONE HYDROCHLORIDE 0.6 MG: 2 INJECTION INTRAMUSCULAR; INTRAVENOUS; SUBCUTANEOUS at 18:42

## 2020-08-06 RX ADMIN — SODIUM CHLORIDE: 9 INJECTION, SOLUTION INTRAVENOUS at 19:56

## 2020-08-06 RX ADMIN — ROCURONIUM BROMIDE 50 MG: 10 INJECTION INTRAVENOUS at 17:54

## 2020-08-06 RX ADMIN — PHYTONADIONE 10 MG: 10 INJECTION, EMULSION INTRAMUSCULAR; INTRAVENOUS; SUBCUTANEOUS at 16:55

## 2020-08-06 RX ADMIN — ONDANSETRON 4 MG: 2 INJECTION INTRAMUSCULAR; INTRAVENOUS at 10:31

## 2020-08-06 RX ADMIN — SODIUM CHLORIDE, PRESERVATIVE FREE 10 ML: 5 INJECTION INTRAVENOUS at 22:52

## 2020-08-06 RX ADMIN — PIPERACILLIN SODIUM, TAZOBACTAM SODIUM 4.5 G: 4; .5 INJECTION, POWDER, LYOPHILIZED, FOR SOLUTION INTRAVENOUS at 16:30

## 2020-08-06 RX ADMIN — DEXAMETHASONE SODIUM PHOSPHATE 8 MG: 4 INJECTION, SOLUTION INTRAMUSCULAR; INTRAVENOUS at 17:54

## 2020-08-06 RX ADMIN — IOPAMIDOL 80 ML: 755 INJECTION, SOLUTION INTRAVENOUS at 12:59

## 2020-08-06 RX ADMIN — PROPOFOL 180 MG: 10 INJECTION, EMULSION INTRAVENOUS at 17:54

## 2020-08-06 RX ADMIN — FENTANYL CITRATE 100 MCG: 50 INJECTION INTRAMUSCULAR; INTRAVENOUS at 17:54

## 2020-08-06 RX ADMIN — MORPHINE SULFATE 2 MG: 2 INJECTION, SOLUTION INTRAMUSCULAR; INTRAVENOUS at 22:52

## 2020-08-06 RX ADMIN — KETAMINE HYDROCHLORIDE 40 MG: 10 INJECTION INTRAMUSCULAR; INTRAVENOUS at 17:54

## 2020-08-06 RX ADMIN — MORPHINE SULFATE 4 MG: 4 INJECTION, SOLUTION INTRAMUSCULAR; INTRAVENOUS at 10:30

## 2020-08-06 RX ADMIN — VECURONIUM BROMIDE FOR INJECTION 2 MG: 1 INJECTION, POWDER, LYOPHILIZED, FOR SOLUTION INTRAVENOUS at 18:38

## 2020-08-06 RX ADMIN — SUGAMMADEX 300 MG: 100 INJECTION, SOLUTION INTRAVENOUS at 19:24

## 2020-08-06 RX ADMIN — KETAMINE HYDROCHLORIDE 10 MG: 10 INJECTION INTRAMUSCULAR; INTRAVENOUS at 18:43

## 2020-08-06 RX ADMIN — MORPHINE SULFATE 4 MG: 4 INJECTION, SOLUTION INTRAMUSCULAR; INTRAVENOUS at 14:54

## 2020-08-06 RX ADMIN — FAMOTIDINE 20 MG: 10 INJECTION INTRAVENOUS at 22:52

## 2020-08-06 RX ADMIN — SODIUM CHLORIDE: 9 INJECTION, SOLUTION INTRAVENOUS at 21:04

## 2020-08-06 ASSESSMENT — PULMONARY FUNCTION TESTS
PIF_VALUE: 22
PIF_VALUE: 21
PIF_VALUE: 1
PIF_VALUE: 22
PIF_VALUE: 1
PIF_VALUE: 20
PIF_VALUE: 1
PIF_VALUE: 14
PIF_VALUE: 22
PIF_VALUE: 1
PIF_VALUE: 23
PIF_VALUE: 3
PIF_VALUE: 22
PIF_VALUE: 21
PIF_VALUE: 22
PIF_VALUE: 21
PIF_VALUE: 22
PIF_VALUE: 22
PIF_VALUE: 21
PIF_VALUE: 1
PIF_VALUE: 20
PIF_VALUE: 15
PIF_VALUE: 15
PIF_VALUE: 21
PIF_VALUE: 1
PIF_VALUE: 22
PIF_VALUE: 21
PIF_VALUE: 22
PIF_VALUE: 21
PIF_VALUE: 21
PIF_VALUE: 22
PIF_VALUE: 21
PIF_VALUE: 22
PIF_VALUE: 21
PIF_VALUE: 0
PIF_VALUE: 23
PIF_VALUE: 21
PIF_VALUE: 23
PIF_VALUE: 20
PIF_VALUE: 22
PIF_VALUE: 20
PIF_VALUE: 23
PIF_VALUE: 21
PIF_VALUE: 1
PIF_VALUE: 22
PIF_VALUE: 21
PIF_VALUE: 22
PIF_VALUE: 0
PIF_VALUE: 22
PIF_VALUE: 22
PIF_VALUE: 21
PIF_VALUE: 3
PIF_VALUE: 22
PIF_VALUE: 21
PIF_VALUE: 21
PIF_VALUE: 23
PIF_VALUE: 20
PIF_VALUE: 22
PIF_VALUE: 20
PIF_VALUE: 22
PIF_VALUE: 24
PIF_VALUE: 20
PIF_VALUE: 23
PIF_VALUE: 22
PIF_VALUE: 21
PIF_VALUE: 21
PIF_VALUE: 22
PIF_VALUE: 21
PIF_VALUE: 21
PIF_VALUE: 2
PIF_VALUE: 22
PIF_VALUE: 21
PIF_VALUE: 20
PIF_VALUE: 22
PIF_VALUE: 24
PIF_VALUE: 22
PIF_VALUE: 12
PIF_VALUE: 22
PIF_VALUE: 20
PIF_VALUE: 2
PIF_VALUE: 21
PIF_VALUE: 1
PIF_VALUE: 21
PIF_VALUE: 21
PIF_VALUE: 20
PIF_VALUE: 1
PIF_VALUE: 21
PIF_VALUE: 21
PIF_VALUE: 22
PIF_VALUE: 23
PIF_VALUE: 21
PIF_VALUE: 2
PIF_VALUE: 21
PIF_VALUE: 21
PIF_VALUE: 1
PIF_VALUE: 22
PIF_VALUE: 24
PIF_VALUE: 20
PIF_VALUE: 1
PIF_VALUE: 21
PIF_VALUE: 1
PIF_VALUE: 2
PIF_VALUE: 22
PIF_VALUE: 20
PIF_VALUE: 22
PIF_VALUE: 23
PIF_VALUE: 24
PIF_VALUE: 15
PIF_VALUE: 22

## 2020-08-06 ASSESSMENT — PAIN DESCRIPTION - PAIN TYPE
TYPE: ACUTE PAIN

## 2020-08-06 ASSESSMENT — ENCOUNTER SYMPTOMS
RESPIRATORY NEGATIVE: 1
DIARRHEA: 0
ABDOMINAL DISTENTION: 1
VOMITING: 0
NAUSEA: 0
ABDOMINAL PAIN: 1
ALLERGIC/IMMUNOLOGIC NEGATIVE: 1
EYES NEGATIVE: 1

## 2020-08-06 ASSESSMENT — PAIN DESCRIPTION - LOCATION
LOCATION: ABDOMEN
LOCATION: ABDOMEN

## 2020-08-06 ASSESSMENT — PAIN SCALES - GENERAL
PAINLEVEL_OUTOF10: 0
PAINLEVEL_OUTOF10: 0
PAINLEVEL_OUTOF10: 8
PAINLEVEL_OUTOF10: 8
PAINLEVEL_OUTOF10: 0
PAINLEVEL_OUTOF10: 6
PAINLEVEL_OUTOF10: 1
PAINLEVEL_OUTOF10: 8
PAINLEVEL_OUTOF10: 3
PAINLEVEL_OUTOF10: 0

## 2020-08-06 ASSESSMENT — LIFESTYLE VARIABLES: SMOKING_STATUS: 0

## 2020-08-06 NOTE — ED PROVIDER NOTES
septal wall asymmetrical LVH.  History of exercise stress test 09/09/2019    treadmill    History of nuclear stress test 09/11/2019    This is a normal study.     Hx of Venous Doppler ultrasound 10/14/2019    No DVT or SVT, Significant reflux in RCFV, RGSV, RSSV, LCFV, LGSV    Left arm pain 09/09/2019    Non-smoker     Obesity     Sleep apnea     TIA (transient ischemic attack) 2008     Past Surgical History:   Procedure Laterality Date    ABDOMEN SURGERY  02/2019    bowel perforation repair    CHOLECYSTECTOMY      FOOT SURGERY Bilateral     LEG SURGERY  2017    compartment syndrome right leg    TONSILLECTOMY         CURRENT MEDICATIONS    Current Outpatient Rx   Medication Sig Dispense Refill    warfarin (COUMADIN) 7.5 MG tablet TAKE 1 TABLET BY MOUTH EVERY OTHER DAY 45 tablet 3    PARoxetine (PAXIL) 10 MG tablet Take 1 tablet by mouth every morning 90 tablet 1    sotalol (BETAPACE) 80 MG tablet Take 1 tablet by mouth 2 times daily 60 tablet 5    warfarin (COUMADIN) 6 MG tablet TAKE 1 TABLET BY MOUTH EVERY OTHER DAY 90 tablet 1    magnesium oxide (MAG-OX) 400 (241.3 Mg) MG TABS tablet Take 0.5 tablets by mouth daily 30 tablet 0       ALLERGIES    Allergies   Allergen Reactions    Aspirin        SOCIAL AND FAMILY HISTORY    Social History     Socioeconomic History    Marital status:      Spouse name: None    Number of children: None    Years of education: None    Highest education level: None   Occupational History    None   Social Needs    Financial resource strain: None    Food insecurity     Worry: None     Inability: None    Transportation needs     Medical: None     Non-medical: None   Tobacco Use    Smoking status: Never Smoker    Smokeless tobacco: Never Used   Substance and Sexual Activity    Alcohol use: Not Currently    Drug use: Never    Sexual activity: None   Lifestyle    Physical activity     Days per week: None     Minutes per session: None    Stress: None skin  Neurologic:  Alert & oriented, normal speech  Psychiatric: Cooperative, pleasant affect       LABS:  Results for orders placed or performed during the hospital encounter of 08/06/20   CBC Auto Differential   Result Value Ref Range    WBC 13.0 (H) 4.0 - 10.5 K/CU MM    RBC 5.45 4.6 - 6.2 M/CU MM    Hemoglobin 17.8 13.5 - 18.0 GM/DL    Hematocrit 52.7 (H) 42 - 52 %    MCV 96.7 78 - 100 FL    MCH 32.7 (H) 27 - 31 PG    MCHC 33.8 32.0 - 36.0 %    RDW 13.4 11.7 - 14.9 %    Platelets 947 712 - 409 K/CU MM    MPV 10.7 7.5 - 11.1 FL    Differential Type AUTOMATED DIFFERENTIAL     Segs Relative 82.2 (H) 36 - 66 %    Lymphocytes % 8.0 (L) 24 - 44 %    Monocytes % 7.9 (H) 0 - 4 %    Eosinophils % 1.3 0 - 3 %    Basophils % 0.3 0 - 1 %    Segs Absolute 10.7 K/CU MM    Lymphocytes Absolute 1.0 K/CU MM    Monocytes Absolute 1.0 K/CU MM    Eosinophils Absolute 0.2 K/CU MM    Basophils Absolute 0.0 K/CU MM    Nucleated RBC % 0.0 %    Total Nucleated RBC 0.0 K/CU MM    Total Immature Neutrophil 0.04 K/CU MM    Immature Neutrophil % 0.3 0 - 0.43 %   Comprehensive Metabolic Panel   Result Value Ref Range    Sodium 138 135 - 145 MMOL/L    Potassium 4.6 3.5 - 5.1 MMOL/L    Chloride 100 99 - 110 mMol/L    CO2 29 21 - 32 MMOL/L    BUN 17 6 - 23 MG/DL    CREATININE 0.9 0.9 - 1.3 MG/DL    Glucose 127 (H) 70 - 99 MG/DL    Calcium 9.8 8.3 - 10.6 MG/DL    Alb 4.3 3.4 - 5.0 GM/DL    Total Protein 7.5 6.4 - 8.2 GM/DL    Total Bilirubin 0.5 0.0 - 1.0 MG/DL    ALT 24 10 - 40 U/L    AST 16 15 - 37 IU/L    Alkaline Phosphatase 112 40 - 128 IU/L    GFR Non-African American >60 >60 mL/min/1.73m2    GFR African American >60 >60 mL/min/1.73m2    Anion Gap 9 4 - 16   Urinalysis   Result Value Ref Range    Color, UA YELLOW YELLOW    Clarity, UA CLEAR CLEAR    Glucose, Urine NEGATIVE NEGATIVE MG/DL    Bilirubin Urine NEGATIVE NEGATIVE MG/DL    Ketones, Urine NEGATIVE NEGATIVE MG/DL    Specific Gravity, UA 1.020 1.001 - 1.035    Blood, Urine NEGATIVE NEGATIVE    pH, Urine 5.0 5.0 - 8.0    Protein, UA NEGATIVE NEGATIVE MG/DL    Urobilinogen, Urine NORMAL 0.2 - 1.0 MG/DL    Nitrite Urine, Quantitative NEGATIVE NEGATIVE    Leukocyte Esterase, Urine NEGATIVE NEGATIVE    RBC, UA <1 0 - 3 /HPF    WBC, UA <1 0 - 2 /HPF    Bacteria, UA NEGATIVE NEGATIVE /HPF    Squam Epithel, UA <1 /HPF    Mucus, UA RARE (A) NEGATIVE HPF    Trichomonas, UA NONE SEEN NONE SEEN /HPF   Lipase   Result Value Ref Range    Lipase 47 13 - 60 IU/L           RADIOLOGY/PROCEDURES    CT ABDOMEN PELVIS W IV CONTRAST   Final Result   Contained perforation involving a segment of mid small bowel in the left   abdomen with significant adjacent mesenteric fat stranding. Critical results were called by Dr. Chen Sharpe to [facility] on   8/6/2020 at 13:16. XR ABDOMEN (2 VIEWS)   Final Result   No free air on the upright image. Overall paucity of bowel gas within the   abdomen. ED COURSE & MEDICAL DECISION MAKING        Patient presents with abdominal discomfort. Imaging today reveals perforated bowel. Patient clinically nontoxic-appearing, afebrile. Patient's pain controlled on serial rechecks. In consideration of current COVID19 pandemic, with effort to minimize unnecessary provider exposure, this patient was seen at bedside by me independently. However, in compliance with current hospital VASILIY/ED protocol, prior to admission I did discuss this patient case with emergency department physician, Dr. Teofilo Cardona. 1410- I discussed Pt case with general surgeon, Dr. Candelaria Love. He recommends Zosyn, n.p.o., 2 units fresh frozen plasma. He recommends admitting to hospitalist and plan for surgical repair. I discussed patient case with hospitalist, nurse practitioner Kane Bronson. He agrees to admit patient. Clinical  IMPRESSION    1. Perforated viscus        Pt admitted.         Comment: Please note this report has been produced using speech recognition software and may contain errors related to that system including errors in grammar, punctuation, and spelling, as well as words and phrases that may be inappropriate. If there are any questions or concerns please feel free to contact the dictating provider for clarification.        Cinda Fonseca  08/06/20 9751

## 2020-08-06 NOTE — BRIEF OP NOTE
Dictated. Job ID: [id number]    Preop: small bowel perforation, contained    Post op: same with extensive adhesions    Procedure:    1. Exploratory laparotomy  2. Extensive lysis of adhesions (>60 min)  3.  Small bowel resection with primary anastomosis     MARK HUMPHREYS II

## 2020-08-06 NOTE — ANESTHESIA POSTPROCEDURE EVALUATION
Department of Anesthesiology  Postprocedure Note    Patient: Jose Manuel Nelson  MRN: [id number]  YOB: 1955  Date of evaluation: 8/6/2020  Time:  7:40 PM     Procedure Summary     Date:  08/06/20 Room / Location:  [address]    Anesthesia Start:  1743 Anesthesia Stop:      Procedure:  LAPAROTOMY EXPLORATORY WITH SMALL BOWEL OBSTRUCTION AND PRIMARY ANASTOMOSIS AND EXTENSIVE LYSIS OF ADHESIONS (N/A Abdomen) Diagnosis:  (free air)    Surgeon:  Pippa Fagan MD Responsible Provider:  INES Jackson CRNA    Anesthesia Type:  general ASA Status:  3          Anesthesia Type: general    John Phase I:      John Phase II:      Last vitals: Reviewed and per EMR flowsheets.        Anesthesia Post Evaluation    Patient location during evaluation: PACU  Patient participation: complete - patient participated  Level of consciousness: awake  Airway patency: patent  Nausea & Vomiting: no vomiting and no nausea  Complications: no  Cardiovascular status: blood pressure returned to baseline and hemodynamically stable  Respiratory status: acceptable, nonlabored ventilation and spontaneous ventilation  Hydration status: stable

## 2020-08-06 NOTE — H&P
History and Physical      Name:  Jacob Saavedra /Age/Sex:   [de-identified]71 y.o. male)   MRN & CSN:  [id number] & [id number] Admission Date/Time: 2020  9:46 AM   Location:  [facility] PCP: Kristy Vazquez MD       Hospital Day: 1        Admitting Physician: Dr. Aneudy De Leon and Plan:   Jacob Saavedra is a 71 y.o. male who presents with Abdominal Pain (severe; bloating and hard; started at 2 am; constant sharp pain. hx of perforated bowel)    Bowel perforation-   Admit to Med/Surg   General surgery consulted   NPO   Plan to take to OR @1700   IV abx Zosyn     PAF with chronic anticoagulation    Holding coumadin   PT/INR daily   FFP prior to surgery    Telemetry     NORMA- over night CPAP     Patient case discussed with ED provider    Diet Diet NPO Effective Now   DVT Prophylaxis [] Lovenox, []  Heparin, [] SCDs, [] Ambulation  [] Long term AC   GI Prophylaxis [] PPI,  [] H2 Blocker,  [] Carafate,  [] Diet/Tube Feeds   Code Status Full     Disposition Admit to inpatient. Patient plans to return home upon discharge   MDM [] Low, [] Moderate,[x]  High     -Patient assessment and plan discussed and reviewed with admitting physician: Reza Reeves MD.     History of Present Illness:     Chief Complaint: Abdominal Pain (severe; bloating and hard; started at 2 am; constant sharp pain. hx of perforated bowel)    Jacob Saavedra is a 71 y.o. male who presents with abdominal pain and bloating. Onset x 1 day. Describes as sharp constant pain improved with pain control in ED. States associated bloating and headache. Has hx of previous perforation with similar symptoms approximately 2 years ago. Wife at bedside augments HPI information. Ten point ROS: reviewed negative, unless as noted in above HPI.     Objective:   No intake or output data in the 24 hours ending 20 1511     Vitals:   Vitals:    20 0933 20 1155   BP: (!) 140/73 (!) 144/81   Pulse: 62 68   Resp: 18 18   Temp: 98.6 °F history that includes Cholecystectomy; Leg Surgery (2017); Abdomen surgery (02/2019); Tonsillectomy; and Foot surgery (Bilateral). Social History:    FAM HX: Reviewed  family history includes Cancer in his father and mother; Heart Attack in his mother; Other in his father and mother. Soc HX:   Social History     Socioeconomic History    Marital status:      Spouse name: None    Number of children: None    Years of education: None    Highest education level: None   Occupational History    None   Social Needs    Financial resource strain: None    Food insecurity     Worry: None     Inability: None    Transportation needs     Medical: None     Non-medical: None   Tobacco Use    Smoking status: Never Smoker    Smokeless tobacco: Never Used   Substance and Sexual Activity    Alcohol use: Not Currently    Drug use: Never    Sexual activity: None   Lifestyle    Physical activity     Days per week: None     Minutes per session: None    Stress: None   Relationships    Social connections     Talks on phone: None     Gets together: None     Attends [employer] service: None     Active member of club or organization: None     Attends meetings of clubs or organizations: None     Relationship status: None    Intimate partner violence     Fear of current or ex partner: None     Emotionally abused: None     Physically abused: None     Forced sexual activity: None   Other Topics Concern    None   Social History Narrative    None     TOBACCO:   reports that he has never smoked. He has never used smokeless tobacco.  ETOH:   reports previous alcohol use. Drugs:  reports no history of drug use. Allergies: Allergies   Allergen Reactions    Aspirin        Home Medications:     Prior to Admission medications    Medication Sig Start Date End Date Taking?  Authorizing Provider   warfarin (COUMADIN) 7.5 MG tablet TAKE 1 TABLET BY MOUTH EVERY OTHER DAY 6/9/20   Navi Small MD   PARoxetine (PAXIL) 10 MG tablet Take 1 tablet by mouth every morning 3/10/20   Laura Mejia MD   sotalol (BETAPACE) 80 MG tablet Take 1 tablet by mouth 2 times daily 2/10/20   Steve Davis MD   warfarin (COUMADIN) 6 MG tablet TAKE 1 TABLET BY MOUTH EVERY OTHER DAY 12/30/19   Laura Mejia MD   magnesium oxide (MAG-OX) 400 (241.3 Mg) MG TABS tablet Take 0.5 tablets by mouth daily 12/13/19   INES Andrade - CNP         Medications:   Medications:    piperacillin-tazobactam  4.5 g Intravenous Once    sodium chloride  20 mL Intravenous Once      Infusions:   PRN Meds: morphine, 4 mg, Q20 Min PRN        Data:     Laboratory this visit:  Reviewed  Recent Labs     08/06/20  0950   WBC 13.0*   HGB 17.8   HCT 52.7*         Recent Labs     08/06/20  0950      K 4.6      CO2 29   BUN 17   CREATININE 0.9     Recent Labs     08/06/20  0950   AST 16   ALT 24   BILITOT 0.5   ALKPHOS 112     Recent Labs     08/06/20  0950   INR 2.75         Radiology this visit:  Reviewed. Ct Abdomen Pelvis W Iv Contrast    Result Date: 8/6/2020  EXAMINATION: CT OF THE ABDOMEN AND PELVIS WITH CONTRAST 8/6/2020 12:36 pm TECHNIQUE: CT of the abdomen and pelvis was performed with the administration of intravenous contrast. Multiplanar reformatted images are provided for review. Dose modulation, iterative reconstruction, and/or weight based adjustment of the mA/kV was utilized to reduce the radiation dose to as low as reasonably achievable. COMPARISON: None. HISTORY: ORDERING SYSTEM PROVIDED HISTORY: Abdominal pain TECHNOLOGIST PROVIDED HISTORY: Reason for exam:->Abdominal pain Reason for Exam: Abdominal pain Acuity: Acute Type of Exam: Initial Relevant Medical/Surgical History: 80 ML ISOVUE 370 FINDINGS: Lower Chest: Bibasilar atelectasis. Organs: Liver, spleen, pancreas, adrenal glands, and kidneys within normal limits. Gallbladder surgically absent. GI/Bowel: No bowel obstruction.   Postoperative changes from partial bowel resection with anastomosis in the upper abdomen. There is a loop of what appears to be mid small bowel in the left abdomen with significant adjacent fat stranding of the mesentery with small pocket of contained extraluminal air compatible with contained perforation. Etiology of this perforation is unclear. Pelvis: Urinary bladder and prostate gland within normal limits. Peritoneum/Retroperitoneum: No free fluid. No lymphadenopathy. Vascular: Retroaortic left renal vein, anatomic variant. Bones/Soft Tissues: No acute osseous abnormality. Contained perforation involving a segment of mid small bowel in the left abdomen with significant adjacent mesenteric fat stranding. Critical results were called by Dr. Junie Espinoza to Riky Sarmiento on 8/6/2020 at 13:16. Xr Abdomen (2 Views)    Result Date: 8/6/2020  EXAMINATION: TWO XRAY VIEWS OF THE ABDOMEN 8/6/2020 10:02 am COMPARISON: None. HISTORY: ORDERING SYSTEM PROVIDED HISTORY: abd pain TECHNOLOGIST PROVIDED HISTORY: Reason for exam:->abd pain Reason for Exam: pain distended bowel hx of bowel perforation check for free air Initial encounter FINDINGS: No free air on the upright image. Mild bibasilar atelectasis. No air-filled dilated loops of bowel. There is overall paucity of bowel gas within the abdomen. Surgical clips in the right upper quadrant. No acute osseous abnormality or pathologic calcifications. No free air on the upright image. Overall paucity of bowel gas within the abdomen.        EKG this visit:  Reviewed       Electronically signed by INES Mcguire CNP on 8/6/2020 at 3:11 PM

## 2020-08-06 NOTE — PROGRESS NOTES
1940 patient received from the OR monitor placed and alarms on. Report received from [address], per report NG placement verified per DR Tu Castañeda   2010 turned and extra linen removed tolerated without difficulty   2020 Report called to \[facility]\"" prior to transport   2031 transferred to 1110 via bed and monitor .  Carlie JOHNSTON at bedside

## 2020-08-06 NOTE — ANESTHESIA PRE PROCEDURE
Department of Anesthesiology  Preprocedure Note       Name:  Srinivas Merritt   Age:  71 y.o.  :  1955                                          MRN:  [id number]         Date:  2020      Surgeon: Ebony Degree):  Herminia Handley MD    Procedure: Procedure(s):  LAPAROTOMY EXPLORATORY    Medications prior to admission:   Prior to Admission medications    Medication Sig Start Date End Date Taking?  Authorizing Provider   warfarin (COUMADIN) 7.5 MG tablet TAKE 1 TABLET BY MOUTH EVERY OTHER DAY 20   Stephie Liu MD   PARoxetine (PAXIL) 10 MG tablet Take 1 tablet by mouth every morning 3/10/20   Stephie Liu MD   sotalol (BETAPACE) 80 MG tablet Take 1 tablet by mouth 2 times daily 2/10/20   Shefali Byrd MD   warfarin (COUMADIN) 6 MG tablet TAKE 1 TABLET BY MOUTH EVERY OTHER DAY 19   Stephie Liu MD   magnesium oxide (MAG-OX) 400 (241.3 Mg) MG TABS tablet Take 0.5 tablets by mouth daily 19   Katherene Jewels, APRN - CNP       Current medications:    Current Facility-Administered Medications   Medication Dose Route Frequency Provider Last Rate Last Dose    morphine sulfate (PF) injection 4 mg  4 mg Intravenous Q20 Min PRN [place], [place]   4 mg at 20 1454    0.9 % sodium chloride bolus  20 mL Intravenous Once [place], [place]        phytonadione (ADULT) (VITAMIN K) 10 mg in dextrose 5 % 100 mL IVPB  10 mg Intravenous Once [facility] 100 mL/hr at 20 1655 10 mg at 20 1655     Current Outpatient Medications   Medication Sig Dispense Refill    warfarin (COUMADIN) 7.5 MG tablet TAKE 1 TABLET BY MOUTH EVERY OTHER DAY 45 tablet 3    PARoxetine (PAXIL) 10 MG tablet Take 1 tablet by mouth every morning 90 tablet 1    sotalol (BETAPACE) 80 MG tablet Take 1 tablet by mouth 2 times daily 60 tablet 5    warfarin (COUMADIN) 6 MG tablet TAKE 1 TABLET BY MOUTH EVERY OTHER DAY 90 tablet 1    magnesium oxide (MAG-OX) 400 (241.3 Mg) MG TABS tablet Take 0.5 tablets by mouth daily 30 tablet 0       Allergies: Allergies   Allergen Reactions    Aspirin        Problem List:    Patient Active Problem List   Diagnosis Code    Essential hypertension I10    Depression F32.9    Atrial fibrillation ([address] I48.91    Sleep apnea G47.30    S/P compartment syndrome decompression Z98.890    Anticoagulated on Coumadin Z79.01    TIA (transient ischemic attack) G45.9    Leg swelling M79.89    Left arm pain M79.602    Precordial pain R07.2    A-fib ([address] I48.91    Encounter for monitoring sotalol therapy Z51.81, Z79.899    Bowel perforation ([address] K63.1       Past Medical History:        Diagnosis Date    Abnormal EKG     Atrial fibrillation ([address] 1986    Depression     Family history of coronary artery disease     Mother-MI.    H/O cardiac catheterization 11/12/2019    No significant CAD.    H/O echocardiogram 09/11/2019    EF 55-60%, Mild septal wall asymmetrical LVH.  History of exercise stress test 09/09/2019    treadmill    History of nuclear stress test 09/11/2019    This is a normal study.     Hx of Venous Doppler ultrasound 10/14/2019    No DVT or SVT, Significant reflux in RCFV, RGSV, RSSV, LCFV, LGSV    Left arm pain 09/09/2019    Non-smoker     Obesity     Sleep apnea     TIA (transient ischemic attack) 2008       Past Surgical History:        Procedure Laterality Date    ABDOMEN SURGERY  02/2019    bowel perforation repair    CHOLECYSTECTOMY      FOOT SURGERY Bilateral     LEG SURGERY  2017    compartment syndrome right leg    TONSILLECTOMY         Social History:    Social History     Tobacco Use    Smoking status: Never Smoker    Smokeless tobacco: Never Used   Substance Use Topics    Alcohol use: Not Currently                                Counseling given: Not Answered      Vital Signs (Current):   Vitals:    08/06/20 1155 08/06/20 1642 08/06/20 1658 08/06/20 1709   BP: (!) 144/81 136/74 102/63    Pulse: 68 64 72 78   Resp: 18 18 18 18   Temp:  98.3 °F (36.8 °C) 100.5 °F (38.1 °C) 100.5 °F (38.1 °C)   TempSrc:  Oral  Oral   SpO2: 96% 96%     Weight:       Height:                                                  BP Readings from Last 3 Encounters:   08/06/20 102/63   06/19/20 (!) 120/90   04/24/20 135/85       NPO Status:                                                                                 BMI:   Wt Readings from Last 3 Encounters:   08/06/20 254 lb (115.2 kg)   06/19/20 262 lb 6.4 oz (119 kg)   04/24/20 265 lb (120.2 kg)     Body mass index is 36.45 kg/m². CBC:   Lab Results   Component Value Date    WBC 13.0 08/06/2020    RBC 5.45 08/06/2020    HGB 17.8 08/06/2020    HCT 52.7 08/06/2020    MCV 96.7 08/06/2020    RDW 13.4 08/06/2020     08/06/2020       CMP:   Lab Results   Component Value Date     08/06/2020    K 4.6 08/06/2020     08/06/2020    CO2 29 08/06/2020    BUN 17 08/06/2020    CREATININE 0.9 08/06/2020    GFRAA >60 08/06/2020    LABGLOM >60 08/06/2020    GLUCOSE 127 08/06/2020    PROT 7.5 08/06/2020    CALCIUM 9.8 08/06/2020    BILITOT 0.5 08/06/2020    ALKPHOS 112 08/06/2020    AST 16 08/06/2020    ALT 24 08/06/2020       POC Tests: No results for input(s): POCGLU, POCNA, POCK, POCCL, POCBUN, POCHEMO, POCHCT in the last 72 hours.     Coags:   Lab Results   Component Value Date    PROTIME 33.6 08/06/2020    PROTIME 32.4 07/15/2019    INR 2.75 08/06/2020    APTT 26.7 11/11/2019       HCG (If Applicable): No results found for: PREGTESTUR, PREGSERUM, HCG, HCGQUANT     ABGs: No results found for: PHART, PO2ART, NYJ6MNY, WXP7FNV, BEART, E7UUPSZK     Type & Screen (If Applicable):  No results found for: LABABO, LABRH    Drug/Infectious Status (If Applicable):  No results found for: HIV, HEPCAB    COVID-19 Screening (If Applicable): No results found for: COVID19      Anesthesia Evaluation  Patient summary reviewed and Nursing notes reviewed no history of anesthetic complications:   Airway: Mallampati: II  TM distance: >3 FB   Neck ROM: full  Mouth opening: > = 3 FB Dental: normal exam         Pulmonary:   (+) sleep apnea: on CPAP,      (-) not a current smoker          Patient did not smoke on day of surgery. Cardiovascular:  Exercise tolerance: good (>4 METS),   (+) hypertension:, dysrhythmias: atrial fibrillation,          Beta Blocker:  Dose within 24 Hrs      ROS comment:  Summary   Left ventricular systolic function is normal with an ejection fraction of   55-60%. Mild septal wall asymmetrical left ventricular hypertrophy. The left atrium is mildly dilated. No significant valvular disease noted. No evidence of pericardial effusion. Neuro/Psych:   (+) TIA, psychiatric history:   (-) seizures           GI/Hepatic/Renal:        (-) GERD       Endo/Other:        (-) diabetes mellitus               Abdominal:           Vascular:                                      Anesthesia Plan      general     ASA 3     (INR is 2.75 . FFP being infused. Vitamin K already infused )  Induction: intravenous. Anesthetic plan and risks discussed with patient. Plan discussed with CRNA and attending.     Attending anesthesiologist reviewed and agrees with Pre Eval content            Maximiliano Salgado MD   8/6/2020

## 2020-08-06 NOTE — CONSULTS
sulfate (PF) injection 4 mg  4 mg Intravenous Q20 Min PRN [place], [place]   4 mg at 08/06/20 1030    piperacillin-tazobactam (ZOSYN) 4.5 g in dextrose 5 % 100 mL IVPB (mini-bag)  4.5 g Intravenous Once [place], [place]        0.9 % sodium chloride bolus  20 mL Intravenous Once [place], [place]         Current Outpatient Medications   Medication Sig Dispense Refill    warfarin (COUMADIN) 7.5 MG tablet TAKE 1 TABLET BY MOUTH EVERY OTHER DAY 45 tablet 3    PARoxetine (PAXIL) 10 MG tablet Take 1 tablet by mouth every morning 90 tablet 1    sotalol (BETAPACE) 80 MG tablet Take 1 tablet by mouth 2 times daily 60 tablet 5    warfarin (COUMADIN) 6 MG tablet TAKE 1 TABLET BY MOUTH EVERY OTHER DAY 90 tablet 1    magnesium oxide (MAG-OX) 400 (241.3 Mg) MG TABS tablet Take 0.5 tablets by mouth daily 30 tablet 0       Allergies:  Aspirin    Social History:   Social History     Socioeconomic History    Marital status:      Spouse name: None    Number of children: None    Years of education: None    Highest education level: None   Occupational History    None   Social Needs    Financial resource strain: None    Food insecurity     Worry: None     Inability: None    Transportation needs     Medical: None     Non-medical: None   Tobacco Use    Smoking status: Never Smoker    Smokeless tobacco: Never Used   Substance and Sexual Activity    Alcohol use: Not Currently    Drug use: Never    Sexual activity: None   Lifestyle    Physical activity     Days per week: None     Minutes per session: None    Stress: None   Relationships    Social connections     Talks on phone: None     Gets together: None     Attends Voodoo service: None     Active member of club or organization: None     Attends meetings of clubs or organizations: None     Relationship status: None    Intimate partner violence     Fear of current or ex partner: None     Emotionally abused: None     Physically abused: None     Forced sexual activity: None   Other Topics Concern    None   Social History Narrative    None       Family History:   Family History   Problem Relation Age of Onset    Heart Attack Mother     Cancer Mother         ovarian    Other Mother         dementia    Other Father         murdered    Cancer Father         prostate cancer       REVIEW OFSYSTEMS:    Review of Systems   Constitutional: Negative. HENT: Negative. Eyes: Negative. Respiratory: Negative. Cardiovascular: Negative. Afib   Gastrointestinal: Positive for abdominal distention and abdominal pain. Negative for diarrhea, nausea and vomiting. Endocrine: Negative. Musculoskeletal: Negative. Skin: Negative. Allergic/Immunologic: Negative. Neurological: Negative. Hematological: Negative. Psychiatric/Behavioral: Negative. PHYSICAL EXAM:  Vitals:    08/06/20 0933 08/06/20 1155   BP: (!) 140/73 (!) 144/81   Pulse: 62 68   Resp: 18 18   Temp: 98.6 °F (37 °C)    TempSrc: Oral    SpO2: 96% 96%   Weight: 254 lb (115.2 kg)    Height: 5' 10\" (1.778 m)        Physical Exam  Constitutional:       Appearance: Normal appearance. HENT:      Head: Normocephalic and atraumatic. Right Ear: External ear normal.      Left Ear: External ear normal.   Neck:      Musculoskeletal: Normal range of motion. Cardiovascular:      Rate and Rhythm: Normal rate. Comments: History of afib. Not on monitor. Radial pulse palpated  Pulmonary:      Effort: Pulmonary effort is normal.      Breath sounds: Normal breath sounds. Abdominal:      General: There is distension. Tenderness: There is abdominal tenderness. Musculoskeletal: Normal range of motion. Skin:     General: Skin is warm and dry. Neurological:      Mental Status: He is alert.    Psychiatric:         Mood and Affect: Mood normal.         Behavior: Behavior normal.         DATA:    CBC with Differential:    Lab Results   Component Value Date    WBC onset of abdominal pain. Was eval by ED and found to have findings concerning for SB perforation. Needs exploration in OR. -Consent obtained  -Reviewed in detail with the patient and/or family the expected pre-operative, operative, and post-operative courses including risks, benefits, and alternatives to the procedure. The patient's questions were answered in detail and agreed to proceed with the procedure.   -NPO, IVF, IV Abx  -FFP / Vit K. INR elevated 2/2 coumadin use for afib. 2 units given. -The patient was counseled at length about the risks of yamileth Covid-19 during their perioperative period and any recovery window from their procedure. The patient was made aware that yamileth Covid-19  may worsen their prognosis for recovering from their procedure  and lend to a higher morbidity and/or mortality risk. All material risks, benefits, and reasonable alternatives including postponing the procedure were discussed. The patient does wish to proceed with the procedure at this time.     [address] [place] [address]

## 2020-08-06 NOTE — ED NOTES
1441 paged hospitalist     Jennifer Bleacher  08/06/20 1446  1444 Darling Rucker mid level with [facility] returned call      Jennifer Bleacher  08/06/20 1446

## 2020-08-07 LAB
ALBUMIN SERPL-MCNC: 3.7 GM/DL (ref 3.4–5)
ALP BLD-CCNC: 88 IU/L (ref 40–129)
ALT SERPL-CCNC: 29 U/L (ref 10–40)
ANION GAP SERPL CALCULATED.3IONS-SCNC: 14 MMOL/L (ref 4–16)
AST SERPL-CCNC: 20 IU/L (ref 15–37)
BASOPHILS ABSOLUTE: 0.1 K/CU MM
BASOPHILS RELATIVE PERCENT: 0.3 % (ref 0–1)
BILIRUB SERPL-MCNC: 1.9 MG/DL (ref 0–1)
BUN BLDV-MCNC: 12 MG/DL (ref 6–23)
CALCIUM SERPL-MCNC: 8.7 MG/DL (ref 8.3–10.6)
CHLORIDE BLD-SCNC: 100 MMOL/L (ref 99–110)
CO2: 23 MMOL/L (ref 21–32)
COMPONENT: NORMAL
COMPONENT: NORMAL
CREAT SERPL-MCNC: 0.8 MG/DL (ref 0.9–1.3)
DIFFERENTIAL TYPE: ABNORMAL
EOSINOPHILS ABSOLUTE: 0 K/CU MM
EOSINOPHILS RELATIVE PERCENT: 0 % (ref 0–3)
GFR AFRICAN AMERICAN: >60 ML/MIN/1.73M2
GFR NON-AFRICAN AMERICAN: >60 ML/MIN/1.73M2
GLUCOSE BLD-MCNC: 160 MG/DL (ref 70–99)
HCT VFR BLD CALC: 48.2 % (ref 42–52)
HEMOGLOBIN: 15.9 GM/DL (ref 13.5–18)
IMMATURE NEUTROPHIL %: 1.3 % (ref 0–0.43)
INR BLD: 1.58 INDEX
LYMPHOCYTES ABSOLUTE: 1 K/CU MM
LYMPHOCYTES RELATIVE PERCENT: 4.7 % (ref 24–44)
MCH RBC QN AUTO: 32.1 PG (ref 27–31)
MCHC RBC AUTO-ENTMCNC: 33 % (ref 32–36)
MCV RBC AUTO: 97.4 FL (ref 78–100)
MONOCYTES ABSOLUTE: 2.2 K/CU MM
MONOCYTES RELATIVE PERCENT: 10 % (ref 0–4)
NUCLEATED RBC %: 0 %
PDW BLD-RTO: 13.3 % (ref 11.7–14.9)
PLATELET # BLD: 174 K/CU MM (ref 140–440)
PMV BLD AUTO: 10.9 FL (ref 7.5–11.1)
POTASSIUM SERPL-SCNC: 4.2 MMOL/L (ref 3.5–5.1)
PROTHROMBIN TIME: 19.2 SECONDS (ref 11.7–14.5)
RBC # BLD: 4.95 M/CU MM (ref 4.6–6.2)
SEGMENTED NEUTROPHILS ABSOLUTE COUNT: 18 K/CU MM
SEGMENTED NEUTROPHILS RELATIVE PERCENT: 83.7 % (ref 36–66)
SODIUM BLD-SCNC: 137 MMOL/L (ref 135–145)
STATUS: NORMAL
STATUS: NORMAL
TOTAL IMMATURE NEUTOROPHIL: 0.28 K/CU MM
TOTAL NUCLEATED RBC: 0 K/CU MM
TOTAL PROTEIN: 6.2 GM/DL (ref 6.4–8.2)
TRANSFUSION STATUS: NORMAL
TRANSFUSION STATUS: NORMAL
UNIT DIVISION: 0
UNIT DIVISION: 0
UNIT NUMBER: NORMAL
UNIT NUMBER: NORMAL
WBC # BLD: 21.5 K/CU MM (ref 4–10.5)

## 2020-08-07 PROCEDURE — 6370000000 HC RX 637 (ALT 250 FOR IP): Performed by: NURSE PRACTITIONER

## 2020-08-07 PROCEDURE — 85025 COMPLETE CBC W/AUTO DIFF WBC: CPT

## 2020-08-07 PROCEDURE — 6360000002 HC RX W HCPCS: Performed by: SURGERY

## 2020-08-07 PROCEDURE — 2500000003 HC RX 250 WO HCPCS: Performed by: NURSE PRACTITIONER

## 2020-08-07 PROCEDURE — 85610 PROTHROMBIN TIME: CPT

## 2020-08-07 PROCEDURE — 2580000003 HC RX 258: Performed by: NURSE PRACTITIONER

## 2020-08-07 PROCEDURE — 99024 POSTOP FOLLOW-UP VISIT: CPT | Performed by: SURGERY

## 2020-08-07 PROCEDURE — 80053 COMPREHEN METABOLIC PANEL: CPT

## 2020-08-07 PROCEDURE — 36415 COLL VENOUS BLD VENIPUNCTURE: CPT

## 2020-08-07 PROCEDURE — 1200000000 HC SEMI PRIVATE

## 2020-08-07 PROCEDURE — 2580000003 HC RX 258: Performed by: SURGERY

## 2020-08-07 RX ORDER — MAGNESIUM OXIDE 400 MG/1
200 TABLET ORAL DAILY
Status: DISCONTINUED | OUTPATIENT
Start: 2020-08-08 | End: 2020-08-10 | Stop reason: HOSPADM

## 2020-08-07 RX ADMIN — PIPERACILLIN AND TAZOBACTAM 3.38 G: 3; .375 INJECTION, POWDER, FOR SOLUTION INTRAVENOUS at 00:51

## 2020-08-07 RX ADMIN — SODIUM CHLORIDE, PRESERVATIVE FREE 10 ML: 5 INJECTION INTRAVENOUS at 09:22

## 2020-08-07 RX ADMIN — SOTALOL HYDROCHLORIDE 80 MG: 80 TABLET ORAL at 21:38

## 2020-08-07 RX ADMIN — MORPHINE SULFATE 4 MG: 4 INJECTION, SOLUTION INTRAMUSCULAR; INTRAVENOUS at 06:01

## 2020-08-07 RX ADMIN — SODIUM CHLORIDE, PRESERVATIVE FREE 10 ML: 5 INJECTION INTRAVENOUS at 21:39

## 2020-08-07 RX ADMIN — FAMOTIDINE 20 MG: 10 INJECTION INTRAVENOUS at 21:39

## 2020-08-07 RX ADMIN — FAMOTIDINE 20 MG: 10 INJECTION INTRAVENOUS at 09:21

## 2020-08-07 RX ADMIN — PAROXETINE HYDROCHLORIDE 10 MG: 20 TABLET, FILM COATED ORAL at 09:29

## 2020-08-07 RX ADMIN — SOTALOL HYDROCHLORIDE 80 MG: 80 TABLET ORAL at 09:29

## 2020-08-07 RX ADMIN — PIPERACILLIN AND TAZOBACTAM 3.38 G: 3; .375 INJECTION, POWDER, FOR SOLUTION INTRAVENOUS at 23:54

## 2020-08-07 RX ADMIN — SODIUM CHLORIDE: 9 INJECTION, SOLUTION INTRAVENOUS at 21:46

## 2020-08-07 RX ADMIN — PIPERACILLIN AND TAZOBACTAM 3.38 G: 3; .375 INJECTION, POWDER, FOR SOLUTION INTRAVENOUS at 16:29

## 2020-08-07 RX ADMIN — PIPERACILLIN AND TAZOBACTAM 3.38 G: 3; .375 INJECTION, POWDER, FOR SOLUTION INTRAVENOUS at 09:20

## 2020-08-07 RX ADMIN — MORPHINE SULFATE 2 MG: 2 INJECTION, SOLUTION INTRAMUSCULAR; INTRAVENOUS at 16:28

## 2020-08-07 RX ADMIN — MAGNESIUM OXIDE TAB 400 MG (240 MG ELEMENTAL MG) 200 MG: 400 (240 MG) TAB at 16:00

## 2020-08-07 ASSESSMENT — PAIN DESCRIPTION - FREQUENCY
FREQUENCY: INTERMITTENT
FREQUENCY: INTERMITTENT

## 2020-08-07 ASSESSMENT — PAIN DESCRIPTION - LOCATION
LOCATION: ABDOMEN
LOCATION: ABDOMEN

## 2020-08-07 ASSESSMENT — PAIN SCALES - GENERAL
PAINLEVEL_OUTOF10: 9
PAINLEVEL_OUTOF10: 0
PAINLEVEL_OUTOF10: 8
PAINLEVEL_OUTOF10: 7
PAINLEVEL_OUTOF10: 9
PAINLEVEL_OUTOF10: 0

## 2020-08-07 ASSESSMENT — PAIN DESCRIPTION - PAIN TYPE
TYPE: ACUTE PAIN
TYPE: ACUTE PAIN

## 2020-08-07 ASSESSMENT — PAIN DESCRIPTION - ORIENTATION
ORIENTATION: MID
ORIENTATION: MID

## 2020-08-07 NOTE — PLAN OF CARE
Problem: Infection:  Goal: Will remain free from infection  Description: Will remain free from infection  Outcome: Ongoing     Problem: Safety:  Goal: Free from accidental physical injury  Description: Free from accidental physical injury  Outcome: Ongoing  Goal: Free from intentional harm  Description: Free from intentional harm  Outcome: Ongoing     Problem: Daily Care:  Goal: Daily care needs are met  Description: Daily care needs are met  Outcome: Ongoing     Problem: Pain:  Goal: Patient's pain/discomfort is manageable  Description: Patient's pain/discomfort is manageable  Outcome: Ongoing  Goal: Pain level will decrease  Description: Pain level will decrease  Outcome: Ongoing  Goal: Control of acute pain  Description: Control of acute pain  Outcome: Ongoing  Goal: Control of chronic pain  Description: Control of chronic pain  Outcome: Ongoing     Problem: Skin Integrity:  Goal: Skin integrity will stabilize  Description: Skin integrity will stabilize  Outcome: Ongoing     Problem: Discharge Planning:  Goal: Patients continuum of care needs are met  Description: Patients continuum of care needs are met  Outcome: Ongoing

## 2020-08-07 NOTE — OP NOTE
[address] [facility]               [address], [address] [facility]                                OPERATIVE REPORT    PATIENT NAME: Alana Chou                      :        1955  MED REC NO:   [id number]                          ROOM:       [address] [place] [address]  ACCOUNT NO:   [de-identified]                           ADMIT DATE: 2020  PROVIDER:     Glenda Whitlock II, MD    DATE OF PROCEDURE:  2020    PREOPERATIVE DIAGNOSIS:  Contained small bowel perforation. POSTOPERATIVE DIAGNOSIS:  Contained small bowel perforation with  extensive adhesions. SURGEON:  Mehul Escobar MD    PROCEDURES PERFORMED:  1. Exploratory laparotomy. 2.  Extensive lysis of adhesions over 60 minutes. 3.  Small bowel resection with primary anastomosis. 4.  Application of Prevena wound vac    ANESTHESIA:  General endotracheal.    IV FLUIDS:  1 liter    ESTIMATED BLOOD LOSS:  200 mL    SPECIMENS:  30 cm length of jejunum. TUBES/LINES/DRAINS:  The patient had a Quick catheter placed that was  removed at the end of the surgery. He also had an NG tube that was  placed and was kept in. COMPLICATIONS:  None apparent; however, the patient was somewhat oozy  secondary to the fact that he came in with an elevated INR. Of note, he  did receive 2 units of FFP prior to surgery as well as vitamin K. INDICATION FOR PROCEDURE:  The patient was evaluated in the emergency  room on 2020 for acute onset of abdominal pain. The patient had a  history of a previous small bowel perforation and the patient presented  with complaints similar to the previous small bowel perforation. In the  emergency room, he was worked up with physical exam, labs and CT. Imaging was consistent with a small bowel perforation contained. Consult was placed to General Surgery.   After risks, benefits, and  alternatives were discussed in detail with the patient, the decision was  made to proceed to the operating room. OPERATIVE STEPS:  On 08/06/2020, the patient was met in the preoperative  holding area. Again risks, benefits, and alternatives were discussed in  detail with the patient. He consented after his questions were answered to his satisfaction. He was transported to the operating room and once in the operating room,  he was transferred onto the operating room table in the supine position. He was secured to the operating room table with multiple straps. All  pressure points were well padded. The patient had sequential compression devices placed that were  operating throughout the case. General anesthesia was induced without complication. The patient had IV antibiotics in accordance with national protocol. A Quick catheter was inserted successfully on the first attempt with  clear return of yellow urine. The patient's abdomen was prepped and draped in a standard sterile  fashion. A [facility]-approved time-out was held with all members of the operating  team present and in agreement. Using a 10-blade scalpel, vertical midline incision was made and carried  down to the fascia with careful attention to hemostasis using  electrocautery. Fascia was identified, incised, grasped and elevated. A safe entry into  the patient's abdomen was accomplished. The length of the incision was opened with attention to the surrounding  structures. Upon entering the patient's abdomen, there were extensive adhesions from  a previous surgery. These were taken down with tedious dissection using sharp and blunt  techniques. Total amount of time to take down the adhesions to the  abdominal wall as well as the small bowel adhesions was over 60 minutes  of operative time. The ligament of Treitz was identified. The small bowel was traced  distally to a point that was thickened and consistent with the  perforated segment of small bowel.   There was some enteric content contained within the mesentery and  what appeared to be purulent material in this area. The mesentery was  also thickened. Proximal and distal points were selected where there was no disease. Proximal and distal margins were stapled using an [employer] blue  loaded stapler. The mesentery was scored and taken down with LigaSure device. Hemostasis was checked and found to be appropriate on multiple occasions. Next, side-to-side functional end-to-end anastomosis was created using  two firings of Eth[employer] Westby blue loaded stapler. The common channel  was checked and found to be hemostatic. The common channel was closed with another firing of the stapler. All  staple lines were oversewn with 3-0 silk pop-offs. Several reinforcing  crotch sutures were placed. The mesenteric defect was closed with multiple figure-of-eight 3-0 silk  sutures. The small bowel was then run again and found to be in correct  orientation and placed back into the abdomen. The decision was made to close the abdomen. The fascia was closed in a  standard fashion using #1 Stratafix. 3-0 Vicryl was placed in the deep  dermal location and skin staples were used. A Prevena wound VAC was  placed. At the end of the case the instrument count, surgical count, and sponge  count were correct x3. At the end of the case, the patient was extubated and transported to the  PACU in stable condition. At the end of the case, Dr. Macrina Dominguez personally informed the patient's  family the outcome of the procedure.         Bautista Nunez MD    D: 08/06/2020 19:33:27       T: 08/06/2020 23:43:06     [email or web address]  Job#: [id number]     Doc#: [id number]    CC:

## 2020-08-07 NOTE — PROGRESS NOTES
General Surgery-Dr. Huang Michelle Day: 2    Chief Complaint on Admission: perforated small bowel      Subjective:     Denies F/C. Incisional tenderness. Asking to have \"tube removed. \"  Wants something to drink. Otherwise without complaints. ROS:  Review of Systems   Negative except as above. Allergies  Aspirin          Diagnosis Date    Abnormal EKG     Atrial fibrillation (HCC)     Depression     Family history of coronary artery disease     Mother-MI.    H/O cardiac catheterization 2019    No significant CAD.    H/O echocardiogram 2019    EF 55-60%, Mild septal wall asymmetrical LVH.  History of exercise stress test 2019    treadmill    History of nuclear stress test 2019    This is a normal study.  Hx of Venous Doppler ultrasound 10/14/2019    No DVT or SVT, Significant reflux in RCFV, RGSV, RSSV, LCFV, LGSV    Left arm pain 2019    Non-smoker     Obesity     Sleep apnea     TIA (transient ischemic attack)        Objective:     Vitals:    20 0608   BP: (!) 146/73   Pulse: 75   Resp: 16   Temp: 98.6 °F (37 °C)   SpO2: 93%       TEMPERATURE:  Current -Temp: 98.6 °F (37 °C); Max - Temp  Av °F (37.2 °C)  Min: 97.4 °F (36.3 °C)  Max: 100.5 °F (38.1 °C)    No intake/output data recorded. I/O last 3 completed shifts: In: 1666.3 [I.V.:1616.3; IV Piggyback:50]  Out: 6252 [Urine:1000; Emesis/NG output:325; Other:50; Blood:200]      Physical Exam:  Physical Exam  Vitals signs reviewed. Constitutional:       General: He is not in acute distress. Appearance: Normal appearance. He is not ill-appearing. HENT:      Head: Normocephalic and atraumatic. Nose:      Comments: +NGT with gastric output       Mouth/Throat:      Mouth: Mucous membranes are dry. Eyes:      General:         Right eye: No discharge. Left eye: No discharge. Cardiovascular:      Rate and Rhythm: Normal rate.    Pulmonary:      Effort: No respiratory distress. Abdominal:      Palpations: Abdomen is soft. Tenderness: There is abdominal tenderness (appropriate and min). Comments: + prevena wound vac. No peritoneal signs. C/D/I     Musculoskeletal: Normal range of motion. Skin:     General: Skin is warm. Neurological:      General: No focal deficit present. Mental Status: He is alert.            Scheduled Meds:   sodium chloride  20 mL Intravenous Once    sodium chloride flush  10 mL Intravenous 2 times per day    famotidine (PEPCID) injection  20 mg Intravenous BID    sotalol  80 mg Oral BID    PARoxetine  10 mg Oral QAM    magnesium oxide  200 mg Oral Daily    piperacillin-tazobactam  3.375 g Intravenous Q8H     ContinuousInfusions:   sodium chloride 75 mL/hr at 08/06/20 2104    sodium chloride 100 mL/hr at 08/06/20 1956     PRN Meds:sodium chloride flush, acetaminophen **OR** acetaminophen, polyethylene glycol, morphine **OR** morphine, ondansetron      Labs/Imaging Results:   Lab Results   Component Value Date    WBC 21.5 (H) 08/07/2020    HGB 15.9 08/07/2020    HCT 48.2 08/07/2020    MCV 97.4 08/07/2020     08/07/2020     Lab Results   Component Value Date     08/06/2020    K 4.6 08/06/2020     08/06/2020    CO2 29 08/06/2020    BUN 17 08/06/2020    CREATININE 0.9 08/06/2020    GLUCOSE 127 (H) 08/06/2020    CALCIUM 9.8 08/06/2020    PROT 7.5 08/06/2020    LABALBU 4.3 08/06/2020    BILITOT 0.5 08/06/2020    ALKPHOS 112 08/06/2020    AST 16 08/06/2020    ALT 24 08/06/2020    LABGLOM >60 08/06/2020    GFRAA >60 08/06/2020       Assessment:     70 y/o M POD 1 s/p ex lap, lysis of adhesions, small bowel resection with primary anastomosis for perforation, and application of wound vac    Plan:     -Continue NPO (except ice chips), NGT, IVF    -Continue ABx    -Ok for DVT, GI prophylaxis     -Awaiting bowel function return.    -Ambulate, IS use    -F/u pathology    Electronically signed by Siri Rivas II, MD on 8/7/2020 at 8:30 AM

## 2020-08-07 NOTE — PROGRESS NOTES
Hospitalist Progress Note      Name:  Jaswinder Garland /Age/Sex: [id number]  [de-identified]71 y.o. male)   MRN & CSN:  [id number] & [id number] Admission Date/Time: 2020  9:46 AM   [facility]:  [facility]- PCP: Amrita Pink MD         Hospital Day: 2    Assessment and Plan:   · Jaswinder Garland is a 71 y.o.  male  who presents with sudden onset of abdominal pain, left-sided. 1.  Sepsis due to small bowel perforation status post exploratory laparotomy with extensive adhesion lysis, small bowel resection and anastomosis postop day 1  -Meets SIRS criteria-2/4 (fever, leukocytosis) + source  -Patient is currently n.p.o., NG tube in, advance diet as tolerated  -Getting occasional spikes of fever, T-max 38.1  -Worsening leukocytosis, may also be reactive, monitor  -Vitals are stable  -Currently on IV Zosyn, analgesics and hydration  -Encourage spirometry    2. Permanent A. fib on Coumadin  -Was given FFP's prior to surgery  -INR today 1.58  -Discuss with surgery on resuming Coumadin  -Sotalol resumed    3. NORMA on CPAP    4. Disposition, PT OT to evaluate    Diet Diet NPO Effective Now   DVT Prophylaxis [] Lovenox, []  Heparin, [] SCDs, []No VTE prophylaxis, patient ambulating   GI Prophylaxis [] PPI, [x] H2 Blocker, [] No GI prophylaxis, patient is receiving diet/Tube Feeds   Code Status Full Code   Disposition Patient requires continued admission due to    MDM [] Low, [] Moderate,[]  High  Patient's risk as above due to      History of Present Illness:     Pt S&E. Patient seen with his wife at bedside, feels a little better this morning, but feeling miserable from the discomfort of the NG tube, he denies any fever or chills, no nausea or vomiting, notes has passed a little gas, no bowel movements. 10-14 point ROS reviewed negative, unless as noted above    Objective:        Intake/Output Summary (Last 24 hours) at 2020 0809  Last data filed at 2020 9717  Gross per 24 hour   Intake 1666.25 ml   Output 1575 ml Net 91.25 ml      Vitals:   Vitals:    08/07/20 0608   BP: (!) 146/73   Pulse: 75   Resp: 16   Temp: 98.6 °F (37 °C)   SpO2: 93%     Physical Exam:    GEN Awake male, sitting upright in bed in no apparent distress. Appears given age. EYES Pupils are equally round. No scleral erythema, discharge, or conjunctivitis. HENT Mucous membranes are moist.  NG tube draining bilious fluid  NECK No apparent thyromegaly or masses. RESP Clear to auscultation, no wheezes, rales or rhonchi. Symmetric chest movement while on room air. CARDIO/VASC S1/S2 auscultated. Regular rate without appreciable murmurs, rubs, or gallops. Peripheral pulses equal bilaterally and palpable. No peripheral edema. GI Abdomen is soft, tender, no masses, or guarding. Bowel sounds are reduced. rectal exam deferred. Midline surgical scar with wound VAC in situ.  Quick catheter is not present. HEME/LYMPH No petechiae or ecchymoses. MSK No gross joint deformities. Spontaneous movement of all extremities  SKIN Normal coloration, warm, dry. NEURO Cranial nerves appear grossly intact, normal speech, no lateralizing weakness. PSYCH Awake, alert, oriented x 4. Affect appropriate.     Medications:   Medications:    sodium chloride  20 mL Intravenous Once    sodium chloride flush  10 mL Intravenous 2 times per day    famotidine (PEPCID) injection  20 mg Intravenous BID    sotalol  80 mg Oral BID    PARoxetine  10 mg Oral QAM    magnesium oxide  200 mg Oral Daily    piperacillin-tazobactam  3.375 g Intravenous Q8H      Infusions:    sodium chloride 75 mL/hr at 08/06/20 2104    sodium chloride 100 mL/hr at 08/06/20 1956     PRN Meds: sodium chloride flush, 10 mL, PRN  acetaminophen, 650 mg, Q6H PRN    Or  acetaminophen, 650 mg, Q6H PRN  polyethylene glycol, 17 g, Daily PRN  morphine, 2 mg, Q2H PRN    Or  morphine, 4 mg, Q2H PRN  ondansetron, 4 mg, Q6H PRN          Electronically signed by Miguel Angel Whiting MD on 8/7/2020 at 8:09 AM

## 2020-08-07 NOTE — PROGRESS NOTES
Patient admitted this evening from ex/lap with lysis of adhesions. Provena in place on midline incision that is stapled. No excessive bleeding noted. NG tube in place. 300ml noted. IV patent with fluids infusing as ordered. Pain medication x 2 this shift with effective results. Incentive spirometer used x 1. Laying in bed without complaint.

## 2020-08-08 LAB
BASOPHILS ABSOLUTE: 0 K/CU MM
BASOPHILS RELATIVE PERCENT: 0.2 % (ref 0–1)
DIFFERENTIAL TYPE: ABNORMAL
EOSINOPHILS ABSOLUTE: 0 K/CU MM
EOSINOPHILS RELATIVE PERCENT: 0.1 % (ref 0–3)
HCT VFR BLD CALC: 44.1 % (ref 42–52)
HEMOGLOBIN: 14.5 GM/DL (ref 13.5–18)
IMMATURE NEUTROPHIL %: 0.8 % (ref 0–0.43)
INR BLD: 1.36 INDEX
LYMPHOCYTES ABSOLUTE: 1.2 K/CU MM
LYMPHOCYTES RELATIVE PERCENT: 7.3 % (ref 24–44)
MCH RBC QN AUTO: 32.4 PG (ref 27–31)
MCHC RBC AUTO-ENTMCNC: 32.9 % (ref 32–36)
MCV RBC AUTO: 98.4 FL (ref 78–100)
MONOCYTES ABSOLUTE: 1.7 K/CU MM
MONOCYTES RELATIVE PERCENT: 10.7 % (ref 0–4)
NUCLEATED RBC %: 0 %
PDW BLD-RTO: 13.6 % (ref 11.7–14.9)
PLATELET # BLD: 165 K/CU MM (ref 140–440)
PMV BLD AUTO: 10.9 FL (ref 7.5–11.1)
PROTHROMBIN TIME: 16.5 SECONDS (ref 11.7–14.5)
RBC # BLD: 4.48 M/CU MM (ref 4.6–6.2)
SEGMENTED NEUTROPHILS ABSOLUTE COUNT: 12.8 K/CU MM
SEGMENTED NEUTROPHILS RELATIVE PERCENT: 80.9 % (ref 36–66)
TOTAL IMMATURE NEUTOROPHIL: 0.13 K/CU MM
TOTAL NUCLEATED RBC: 0 K/CU MM
WBC # BLD: 15.8 K/CU MM (ref 4–10.5)

## 2020-08-08 PROCEDURE — 99024 POSTOP FOLLOW-UP VISIT: CPT | Performed by: SURGERY

## 2020-08-08 PROCEDURE — 36415 COLL VENOUS BLD VENIPUNCTURE: CPT

## 2020-08-08 PROCEDURE — 6370000000 HC RX 637 (ALT 250 FOR IP): Performed by: INTERNAL MEDICINE

## 2020-08-08 PROCEDURE — 94150 VITAL CAPACITY TEST: CPT

## 2020-08-08 PROCEDURE — 1200000000 HC SEMI PRIVATE

## 2020-08-08 PROCEDURE — 94761 N-INVAS EAR/PLS OXIMETRY MLT: CPT

## 2020-08-08 PROCEDURE — 2580000003 HC RX 258: Performed by: SURGERY

## 2020-08-08 PROCEDURE — 85610 PROTHROMBIN TIME: CPT

## 2020-08-08 PROCEDURE — 6370000000 HC RX 637 (ALT 250 FOR IP): Performed by: NURSE PRACTITIONER

## 2020-08-08 PROCEDURE — 2500000003 HC RX 250 WO HCPCS: Performed by: NURSE PRACTITIONER

## 2020-08-08 PROCEDURE — 6360000002 HC RX W HCPCS: Performed by: SURGERY

## 2020-08-08 PROCEDURE — 2580000003 HC RX 258: Performed by: NURSE PRACTITIONER

## 2020-08-08 PROCEDURE — 85025 COMPLETE CBC W/AUTO DIFF WBC: CPT

## 2020-08-08 RX ORDER — WARFARIN SODIUM 2.5 MG/1
7.5 TABLET ORAL DAILY
Status: DISCONTINUED | OUTPATIENT
Start: 2020-08-08 | End: 2020-08-10 | Stop reason: HOSPADM

## 2020-08-08 RX ADMIN — MAGNESIUM OXIDE 400 MG (241.3 MG MAGNESIUM) TABLET 200 MG: TABLET at 12:28

## 2020-08-08 RX ADMIN — FAMOTIDINE 20 MG: 10 INJECTION INTRAVENOUS at 12:28

## 2020-08-08 RX ADMIN — SOTALOL HYDROCHLORIDE 80 MG: 80 TABLET ORAL at 12:29

## 2020-08-08 RX ADMIN — SODIUM CHLORIDE, PRESERVATIVE FREE 10 ML: 5 INJECTION INTRAVENOUS at 12:29

## 2020-08-08 RX ADMIN — PIPERACILLIN AND TAZOBACTAM 3.38 G: 3; .375 INJECTION, POWDER, FOR SOLUTION INTRAVENOUS at 12:29

## 2020-08-08 RX ADMIN — SODIUM CHLORIDE: 9 INJECTION, SOLUTION INTRAVENOUS at 11:17

## 2020-08-08 RX ADMIN — WARFARIN SODIUM 7.5 MG: 2.5 TABLET ORAL at 16:42

## 2020-08-08 RX ADMIN — PAROXETINE HYDROCHLORIDE 10 MG: 20 TABLET, FILM COATED ORAL at 12:28

## 2020-08-08 NOTE — PROGRESS NOTES
Pt ambulated in hallway, tolerated well. No symptoms of distress. Pt's O2 sat after walk was 92% RA. 2LPM O2 applied to patient. Pt in bed and encouraged cough and deep breathing. Pt. Awake and on phone. Respirations wnl and no signs of distress. Report given to shift nurse. New bag of sodium chloride hanging at 75 ml/hr.    Myra Martinez

## 2020-08-08 NOTE — PROGRESS NOTES
PHARMACY ANTICOAGULATION MONITORING SERVICE    Rochelle Obrien is a 71 y.o. male on warfarin therapy for Atril Fibrillation. Pharmacy consulted by Dr. Dayron Serrano for monitoring and adjustment of treatment. Indication for anticoagulation: Atrial Fibrillation  INR goal: 2 - 3  Warfarin dose prior to admission: 7.5mg every other day    Pertinent Laboratory Values   Recent Labs     08/06/20  0950  08/07/20  0656 08/08/20  0730   INR 2.75   < > 1.58 1.36   HGB 17.8  --  15.9 14.5   HCT 52.7*  --  48.2 44.1     --  174 165    < > = values in this interval not displayed. Assessment/Plan:  Drug Interactions: none at this time  INR 1.36  Warfarin was held prior to surgery. Also receive Vitamin K 10mg IVPB x 1 dose on 8/6. Patient started on Warfarin 7.5mg daily. Today's dose already given. Will watch INR and adjust dose as needed. Pharmacy will continue to monitor and adjust warfarin therapy as indicated    Thank you for the consult.   [facility]  8/8/2020 5:37 PM

## 2020-08-08 NOTE — PROGRESS NOTES
General Surgery-Dr. Gladys Sneed Day: 3    Chief Complaint on Admission: perforated small bowel      Subjective:     Denies F/C. Incisional tenderness. Asking to have \"tube removed. \" - 200ml/24h  Passing flatus  Wants something to drink. Otherwise without complaints. ROS:  Review of Systems   Negative except as above. Allergies  Aspirin          Diagnosis Date    Abnormal EKG     Atrial fibrillation (HCC) 1986    Depression     Family history of coronary artery disease     Mother-MI.    H/O cardiac catheterization 2019    No significant CAD.    H/O echocardiogram 2019    EF 55-60%, Mild septal wall asymmetrical LVH.  History of exercise stress test 2019    treadmill    History of nuclear stress test 2019    This is a normal study.  Hx of Venous Doppler ultrasound 10/14/2019    No DVT or SVT, Significant reflux in RCFV, RGSV, RSSV, LCFV, LGSV    Left arm pain 2019    Non-smoker     Obesity     Sleep apnea     TIA (transient ischemic attack)        Objective:     Vitals:    20 1137   BP: (!) 150/77   Pulse: 69   Resp: 16   Temp: 98 °F (36.7 °C)   SpO2: 96%       TEMPERATURE:  Current -Temp: 98 °F (36.7 °C); Max - Temp  Av.9 °F (36.6 °C)  Min: 97.5 °F (36.4 °C)  Max: 98.3 °F (36.8 °C)    I/O this shift:  In: -   Out: 200 [Emesis/NG output:200]I/O last 3 completed shifts: In: 1430 [I.V.:1330; IV Piggyback:100]  Out: 870 [Urine:870]      Physical Exam:  Physical Exam  Vitals signs reviewed. Constitutional:       General: He is not in acute distress. Appearance: Normal appearance. He is not ill-appearing. HENT:      Head: Normocephalic and atraumatic. Nose:      Comments: +NGT with gastric output       Mouth/Throat:      Mouth: Mucous membranes are dry. Eyes:      General:         Right eye: No discharge. Left eye: No discharge. Cardiovascular:      Rate and Rhythm: Normal rate.    Pulmonary:      Effort: No signed by Eulalio Marks MD on 8/8/2020 at 12:03 PM

## 2020-08-08 NOTE — PLAN OF CARE
Problem: Infection:  Goal: Will remain free from infection  Description: Will remain free from infection  8/8/2020 0020 by Miguel Delgadillo RN  Outcome: Ongoing  8/7/2020 1855 by Elena Lund RN  Outcome: Ongoing     Problem: Safety:  Goal: Free from accidental physical injury  Description: Free from accidental physical injury  8/8/2020 0020 by Miguel Delgadillo RN  Outcome: Ongoing  8/7/2020 1855 by Elena Lund RN  Outcome: Ongoing  Goal: Free from intentional harm  Description: Free from intentional harm  8/8/2020 0020 by Miguel Delgadillo RN  Outcome: Ongoing  8/7/2020 1855 by Elena Lund RN  Outcome: Ongoing     Problem: Daily Care:  Goal: Daily care needs are met  Description: Daily care needs are met  8/8/2020 0020 by Miguel Delgadillo RN  Outcome: Ongoing  8/7/2020 1855 by Elena Lund RN  Outcome: Ongoing     Problem: Pain:  Goal: Patient's pain/discomfort is manageable  Description: Patient's pain/discomfort is manageable  8/8/2020 0020 by Miguel Delgadillo RN  Outcome: Ongoing  8/7/2020 1855 by Elena Lund RN  Outcome: Ongoing  Goal: Pain level will decrease  Description: Pain level will decrease  8/8/2020 0020 by Miguel Delgadillo RN  Outcome: Ongoing  8/7/2020 1855 by Elena Lund RN  Outcome: Ongoing  Goal: Control of acute pain  Description: Control of acute pain  8/8/2020 0020 by Miguel Delgadillo RN  Outcome: Ongoing  8/7/2020 1855 by Elena Lund RN  Outcome: Ongoing  Goal: Control of chronic pain  Description: Control of chronic pain  8/8/2020 0020 by Miguel Delgadillo RN  Outcome: Ongoing  8/7/2020 1855 by Elena Lund RN  Outcome: Ongoing     Problem: Skin Integrity:  Goal: Skin integrity will stabilize  Description: Skin integrity will stabilize  8/8/2020 0020 by Miguel Delgadillo RN  Outcome: Ongoing  8/7/2020 1855 by Elena Lund RN  Outcome: Ongoing     Problem: Discharge Planning:  Goal: Patients continuum of care needs are

## 2020-08-08 NOTE — PROGRESS NOTES
Hospitalist Progress Note      Name:  Cristal Carney /Age/Sex:   [de-identified]71 y.o. male)   MRN & CSN:  [id number] & [id number] Admission Date/Time: 2020  9:46 AM   [facility]- PCP: Eddi Diaz MD         Hospital Day: 3    Assessment and Plan:   Cristal Carney is a 71 y.o.  male  who presents with sudden onset abdominal pain.     1. Sepsis due to small bowel perforation status post exploratory laparotomy with extensive adhesion lysis, small bowel resection and anastomosis postop day 2  -NG tube  to be removed, advance diet as tolerated  -leukocytosis trending down, may also be reactive, monitor  -Vitals are stable  -Currently on IV Zosyn, analgesics and hydration  -Encourage spirometry     2.  Permanent A. fib on Coumadin  -Was given FFP's prior to surgery  -INR today 1.36  -resuming Coumadin, pharmacy to dose  -Sotalol resumed     3. NORMA on CPAP     4. Disposition, PT OT to evaluate         Diet Diet NPO Effective Now   DVT Prophylaxis [] Lovenox, []  Heparin, [] SCDs, []No VTE prophylaxis, patient ambulating   GI Prophylaxis [] PPI, [x] H2 Blocker, [] No GI prophylaxis, patient is receiving diet/Tube Feeds   Code Status Full Code   Disposition Patient requires continued admission due to    MDM [] Low, [] Moderate,[]  High  Patient's risk as above due to      History of Present Illness:     Pt S&E. In better spirits today, at bedside with his wife. Notes pain is more controlled, hungry and ready to eat. No fever or chills, no nausea or vomiting, has been passing gas, no bowel movements yet and has been ambulating around. He also has been using his incentive spirometry. 10-14 point ROS reviewed negative, unless as noted above    Objective:        Intake/Output Summary (Last 24 hours) at 2020 0904  Last data filed at 2020 0723  Gross per 24 hour   Intake 1430 ml   Output 1070 ml   Net 360 ml      Vitals:   Vitals:    20 0838   BP:    Pulse:    Resp:    Temp:    SpO2:

## 2020-08-09 LAB
BASOPHILS ABSOLUTE: 0.1 K/CU MM
BASOPHILS RELATIVE PERCENT: 0.5 % (ref 0–1)
DIFFERENTIAL TYPE: ABNORMAL
EOSINOPHILS ABSOLUTE: 0.1 K/CU MM
EOSINOPHILS RELATIVE PERCENT: 0.5 % (ref 0–3)
HCT VFR BLD CALC: 52 % (ref 42–52)
HEMOGLOBIN: 16.1 GM/DL (ref 13.5–18)
IMMATURE NEUTROPHIL %: 0.7 % (ref 0–0.43)
INR BLD: 1.15 INDEX
LYMPHOCYTES ABSOLUTE: 0.8 K/CU MM
LYMPHOCYTES RELATIVE PERCENT: 7.9 % (ref 24–44)
MCH RBC QN AUTO: 32.7 PG (ref 27–31)
MCHC RBC AUTO-ENTMCNC: 31 % (ref 32–36)
MCV RBC AUTO: 105.5 FL (ref 78–100)
MONOCYTES ABSOLUTE: 1 K/CU MM
MONOCYTES RELATIVE PERCENT: 9.3 % (ref 0–4)
NUCLEATED RBC %: 0 %
PDW BLD-RTO: 13.7 % (ref 11.7–14.9)
PLATELET # BLD: 196 K/CU MM (ref 140–440)
PMV BLD AUTO: 11 FL (ref 7.5–11.1)
PROTHROMBIN TIME: 13.9 SECONDS (ref 11.7–14.5)
RBC # BLD: 4.93 M/CU MM (ref 4.6–6.2)
SEGMENTED NEUTROPHILS ABSOLUTE COUNT: 8.7 K/CU MM
SEGMENTED NEUTROPHILS RELATIVE PERCENT: 81.1 % (ref 36–66)
TOTAL IMMATURE NEUTOROPHIL: 0.07 K/CU MM
TOTAL NUCLEATED RBC: 0 K/CU MM
WBC # BLD: 10.7 K/CU MM (ref 4–10.5)

## 2020-08-09 PROCEDURE — 94761 N-INVAS EAR/PLS OXIMETRY MLT: CPT

## 2020-08-09 PROCEDURE — 6370000000 HC RX 637 (ALT 250 FOR IP): Performed by: NURSE PRACTITIONER

## 2020-08-09 PROCEDURE — 94150 VITAL CAPACITY TEST: CPT

## 2020-08-09 PROCEDURE — 1200000000 HC SEMI PRIVATE

## 2020-08-09 PROCEDURE — 2500000003 HC RX 250 WO HCPCS: Performed by: NURSE PRACTITIONER

## 2020-08-09 PROCEDURE — 99024 POSTOP FOLLOW-UP VISIT: CPT | Performed by: SURGERY

## 2020-08-09 PROCEDURE — 2700000000 HC OXYGEN THERAPY PER DAY

## 2020-08-09 PROCEDURE — 85610 PROTHROMBIN TIME: CPT

## 2020-08-09 PROCEDURE — 2580000003 HC RX 258: Performed by: NURSE PRACTITIONER

## 2020-08-09 PROCEDURE — 6360000002 HC RX W HCPCS: Performed by: SURGERY

## 2020-08-09 PROCEDURE — 6370000000 HC RX 637 (ALT 250 FOR IP): Performed by: INTERNAL MEDICINE

## 2020-08-09 PROCEDURE — 36415 COLL VENOUS BLD VENIPUNCTURE: CPT

## 2020-08-09 PROCEDURE — 2580000003 HC RX 258: Performed by: SURGERY

## 2020-08-09 PROCEDURE — 85025 COMPLETE CBC W/AUTO DIFF WBC: CPT

## 2020-08-09 RX ORDER — AMLODIPINE BESYLATE 5 MG/1
5 TABLET ORAL DAILY
Status: DISCONTINUED | OUTPATIENT
Start: 2020-08-09 | End: 2020-08-10 | Stop reason: HOSPADM

## 2020-08-09 RX ADMIN — SODIUM CHLORIDE: 9 INJECTION, SOLUTION INTRAVENOUS at 22:24

## 2020-08-09 RX ADMIN — PAROXETINE HYDROCHLORIDE 10 MG: 20 TABLET, FILM COATED ORAL at 09:46

## 2020-08-09 RX ADMIN — ONDANSETRON 4 MG: 2 INJECTION INTRAMUSCULAR; INTRAVENOUS at 12:25

## 2020-08-09 RX ADMIN — SODIUM CHLORIDE, PRESERVATIVE FREE 10 ML: 5 INJECTION INTRAVENOUS at 22:19

## 2020-08-09 RX ADMIN — FAMOTIDINE 20 MG: 10 INJECTION INTRAVENOUS at 00:18

## 2020-08-09 RX ADMIN — SOTALOL HYDROCHLORIDE 80 MG: 80 TABLET ORAL at 09:30

## 2020-08-09 RX ADMIN — AMLODIPINE BESYLATE 5 MG: 5 TABLET ORAL at 09:45

## 2020-08-09 RX ADMIN — SODIUM CHLORIDE, PRESERVATIVE FREE 10 ML: 5 INJECTION INTRAVENOUS at 10:29

## 2020-08-09 RX ADMIN — SOTALOL HYDROCHLORIDE 80 MG: 80 TABLET ORAL at 00:17

## 2020-08-09 RX ADMIN — SOTALOL HYDROCHLORIDE 80 MG: 80 TABLET ORAL at 22:19

## 2020-08-09 RX ADMIN — PIPERACILLIN AND TAZOBACTAM 3.38 G: 3; .375 INJECTION, POWDER, FOR SOLUTION INTRAVENOUS at 17:27

## 2020-08-09 RX ADMIN — FAMOTIDINE 20 MG: 10 INJECTION INTRAVENOUS at 22:19

## 2020-08-09 RX ADMIN — WARFARIN SODIUM 7.5 MG: 2.5 TABLET ORAL at 17:27

## 2020-08-09 RX ADMIN — PIPERACILLIN AND TAZOBACTAM 3.38 G: 3; .375 INJECTION, POWDER, FOR SOLUTION INTRAVENOUS at 10:29

## 2020-08-09 RX ADMIN — SODIUM CHLORIDE: 9 INJECTION, SOLUTION INTRAVENOUS at 10:25

## 2020-08-09 RX ADMIN — FAMOTIDINE 20 MG: 10 INJECTION INTRAVENOUS at 09:45

## 2020-08-09 RX ADMIN — PIPERACILLIN AND TAZOBACTAM 3.38 G: 3; .375 INJECTION, POWDER, FOR SOLUTION INTRAVENOUS at 00:19

## 2020-08-09 RX ADMIN — MORPHINE SULFATE 2 MG: 2 INJECTION, SOLUTION INTRAMUSCULAR; INTRAVENOUS at 05:23

## 2020-08-09 RX ADMIN — SODIUM CHLORIDE, PRESERVATIVE FREE 10 ML: 5 INJECTION INTRAVENOUS at 00:24

## 2020-08-09 RX ADMIN — MAGNESIUM OXIDE 400 MG (241.3 MG MAGNESIUM) TABLET 200 MG: TABLET at 09:46

## 2020-08-09 RX ADMIN — SODIUM CHLORIDE: 9 INJECTION, SOLUTION INTRAVENOUS at 00:15

## 2020-08-09 ASSESSMENT — PAIN DESCRIPTION - PROGRESSION: CLINICAL_PROGRESSION: RAPIDLY WORSENING

## 2020-08-09 ASSESSMENT — PAIN DESCRIPTION - ORIENTATION: ORIENTATION: MID

## 2020-08-09 ASSESSMENT — PAIN DESCRIPTION - LOCATION: LOCATION: ABDOMEN

## 2020-08-09 ASSESSMENT — PAIN SCALES - GENERAL
PAINLEVEL_OUTOF10: 7
PAINLEVEL_OUTOF10: 0

## 2020-08-09 ASSESSMENT — PAIN DESCRIPTION - FREQUENCY: FREQUENCY: INTERMITTENT

## 2020-08-09 ASSESSMENT — PAIN DESCRIPTION - ONSET: ONSET: AWAKENED FROM SLEEP

## 2020-08-09 ASSESSMENT — PAIN - FUNCTIONAL ASSESSMENT: PAIN_FUNCTIONAL_ASSESSMENT: PREVENTS OR INTERFERES SOME ACTIVE ACTIVITIES AND ADLS

## 2020-08-09 ASSESSMENT — PAIN DESCRIPTION - PAIN TYPE: TYPE: SURGICAL PAIN

## 2020-08-09 ASSESSMENT — PAIN DESCRIPTION - DESCRIPTORS: DESCRIPTORS: ACHING

## 2020-08-09 NOTE — PROGRESS NOTES
PHARMACY ANTICOAGULATION MONITORING SERVICE    Katie Hudson is a 71 y.o. male on warfarin therapy for Atril Fibrillation. Pharmacy consulted by Dr. Tova Bang for monitoring and adjustment of treatment. Indication for anticoagulation: Atrial Fibrillation  INR goal: 2 - 3  Warfarin dose prior to admission: 7.5mg/6mg alternating every other day    Pertinent Laboratory Values   Recent Labs     08/07/20  0656 08/08/20  0730 08/09/20  0554   INR 1.58 1.36 1.15   HGB 15.9 14.5 16.1   HCT 48.2 44.1 52.0    165 196       Assessment/Plan:   Drug Interactions: none at this time   INR 1.15, sub-therapeutic   Warfarin was held prior to surgery. Also receive Vitamin K 10mg IVPB x 1 dose on 8/6.  Continue Warfarin 7.5mg daily. [address] [place] [address] will continue to monitor and adjust warfarin therapy as indicated    Thank you for the consult.   Bettie Quintanilla, [place]  8/9/2020 8:18 AM

## 2020-08-09 NOTE — PLAN OF CARE
Problem: Infection:  Goal: Will remain free from infection  Description: Will remain free from infection  Outcome: Ongoing     Problem: Safety:  Goal: Free from accidental physical injury  Description: Free from accidental physical injury  Outcome: Ongoing  Goal: Free from intentional harm  Description: Free from intentional harm  Outcome: Ongoing     Problem: Daily Care:  Goal: Daily care needs are met  Description: Daily care needs are met  Outcome: Ongoing     Problem: Pain:  Goal: Patient's pain/discomfort is manageable  Description: Patient's pain/discomfort is manageable  Outcome: Ongoing  Goal: Pain level will decrease  Description: Pain level will decrease  Outcome: Ongoing  Goal: Control of acute pain  Description: Control of acute pain  Outcome: Ongoing  Goal: Control of chronic pain  Description: Control of chronic pain  Outcome: Ongoing     Problem: Skin Integrity:  Goal: Skin integrity will stabilize  Description: Skin integrity will stabilize  Outcome: Ongoing     Problem: Discharge Planning:  Goal: Patients continuum of care needs are met  Description: Patients continuum of care needs are met  Outcome: Ongoing     Problem: Falls - Risk of:  Goal: Will remain free from falls  Description: Will remain free from falls  Outcome: Ongoing  Goal: Absence of physical injury  Description: Absence of physical injury  Outcome: Ongoing       Electronically signed by La Nena Helton, RN on 8/9/20 at 3:59 AM EDT

## 2020-08-09 NOTE — PROGRESS NOTES
1500 ml   Net -1200 ml      Vitals:   Vitals:    08/09/20 0426   BP: (!) 145/83   Pulse: 74   Resp: 16   Temp: 97.5 °F (36.4 °C)   SpO2: 95%     Physical Exam:    GEN Awake male, sitting upright in bed in no apparent distress. Appears given age. EYES Pupils are equally round. No scleral erythema, discharge, or conjunctivitis. HENT Mucous membranes are moist.   NECK No apparent thyromegaly or masses. RESP Clear to auscultation, no wheezes, rales or rhonchi. Symmetric chest movement while on room air. CARDIO/VASC S1/S2 auscultated. Regular rate without appreciable murmurs, rubs, or gallops. Peripheral pulses equal bilaterally and palpable. No peripheral edema. GI Abdomen is soft without significant tenderness, masses, or guarding. Bowel sounds are normoactive. Rectal exam deferred. Wound VAC in   Quick catheter is not present. HEME/LYMPH No petechiae or ecchymoses. MSK No gross joint deformities. Spontaneous movement of all extremities  SKIN Normal coloration, warm, dry. NEURO Cranial nerves appear grossly intact, normal speech, no lateralizing weakness. PSYCH Awake, alert, oriented x 4. Affect appropriate.     Medications:   Medications:    warfarin  7.5 mg Oral Daily    magnesium oxide  200 mg Oral Daily    sodium chloride  20 mL Intravenous Once    sodium chloride flush  10 mL Intravenous 2 times per day    famotidine (PEPCID) injection  20 mg Intravenous BID    sotalol  80 mg Oral BID    PARoxetine  10 mg Oral QAM    piperacillin-tazobactam  3.375 g Intravenous Q8H      Infusions:    sodium chloride 75 mL/hr at 08/09/20 0015    sodium chloride 100 mL/hr at 08/07/20 2146     PRN Meds: sodium chloride flush, 10 mL, PRN  acetaminophen, 650 mg, Q6H PRN    Or  acetaminophen, 650 mg, Q6H PRN  polyethylene glycol, 17 g, Daily PRN  morphine, 2 mg, Q2H PRN    Or  morphine, 4 mg, Q2H PRN  ondansetron, 4 mg, Q6H PRN          Electronically signed by Anne Plasencia MD on 8/9/2020 at 7:59 AM

## 2020-08-09 NOTE — PROGRESS NOTES
flatus    -Ambulate, IS use    -F/u pathology    Electronically signed by Gonzalo Ayala MD on 8/9/2020 at 11:17 AM

## 2020-08-10 VITALS
DIASTOLIC BLOOD PRESSURE: 71 MMHG | WEIGHT: 269.5 LBS | BODY MASS INDEX: 38.58 KG/M2 | TEMPERATURE: 98.3 F | RESPIRATION RATE: 16 BRPM | HEART RATE: 57 BPM | HEIGHT: 70 IN | SYSTOLIC BLOOD PRESSURE: 142 MMHG | OXYGEN SATURATION: 94 %

## 2020-08-10 LAB
ANION GAP SERPL CALCULATED.3IONS-SCNC: 7 MMOL/L (ref 4–16)
BASOPHILS ABSOLUTE: 0 K/CU MM
BASOPHILS RELATIVE PERCENT: 0.3 % (ref 0–1)
BUN BLDV-MCNC: 11 MG/DL (ref 6–23)
CALCIUM SERPL-MCNC: 8.7 MG/DL (ref 8.3–10.6)
CHLORIDE BLD-SCNC: 100 MMOL/L (ref 99–110)
CO2: 29 MMOL/L (ref 21–32)
CREAT SERPL-MCNC: 0.9 MG/DL (ref 0.9–1.3)
DIFFERENTIAL TYPE: ABNORMAL
EOSINOPHILS ABSOLUTE: 0.2 K/CU MM
EOSINOPHILS RELATIVE PERCENT: 1.6 % (ref 0–3)
GFR AFRICAN AMERICAN: >60 ML/MIN/1.73M2
GFR NON-AFRICAN AMERICAN: >60 ML/MIN/1.73M2
GLUCOSE BLD-MCNC: 146 MG/DL (ref 70–99)
HCT VFR BLD CALC: 45.1 % (ref 42–52)
HEMOGLOBIN: 14.8 GM/DL (ref 13.5–18)
IMMATURE NEUTROPHIL %: 0.9 % (ref 0–0.43)
INR BLD: 1.24 INDEX
LYMPHOCYTES ABSOLUTE: 1 K/CU MM
LYMPHOCYTES RELATIVE PERCENT: 9.4 % (ref 24–44)
MCH RBC QN AUTO: 32.3 PG (ref 27–31)
MCHC RBC AUTO-ENTMCNC: 32.8 % (ref 32–36)
MCV RBC AUTO: 98.5 FL (ref 78–100)
MONOCYTES ABSOLUTE: 1 K/CU MM
MONOCYTES RELATIVE PERCENT: 9.7 % (ref 0–4)
NUCLEATED RBC %: 0 %
PDW BLD-RTO: 13.4 % (ref 11.7–14.9)
PLATELET # BLD: 230 K/CU MM (ref 140–440)
PMV BLD AUTO: 10.7 FL (ref 7.5–11.1)
POTASSIUM SERPL-SCNC: 4 MMOL/L (ref 3.5–5.1)
PROTHROMBIN TIME: 15 SECONDS (ref 11.7–14.5)
RBC # BLD: 4.58 M/CU MM (ref 4.6–6.2)
SEGMENTED NEUTROPHILS ABSOLUTE COUNT: 7.9 K/CU MM
SEGMENTED NEUTROPHILS RELATIVE PERCENT: 78.1 % (ref 36–66)
SODIUM BLD-SCNC: 136 MMOL/L (ref 135–145)
TOTAL IMMATURE NEUTOROPHIL: 0.09 K/CU MM
TOTAL NUCLEATED RBC: 0 K/CU MM
WBC # BLD: 10.1 K/CU MM (ref 4–10.5)

## 2020-08-10 PROCEDURE — 85025 COMPLETE CBC W/AUTO DIFF WBC: CPT

## 2020-08-10 PROCEDURE — 2580000003 HC RX 258: Performed by: NURSE PRACTITIONER

## 2020-08-10 PROCEDURE — 6360000002 HC RX W HCPCS: Performed by: SURGERY

## 2020-08-10 PROCEDURE — 97162 PT EVAL MOD COMPLEX 30 MIN: CPT

## 2020-08-10 PROCEDURE — 94761 N-INVAS EAR/PLS OXIMETRY MLT: CPT

## 2020-08-10 PROCEDURE — 6370000000 HC RX 637 (ALT 250 FOR IP): Performed by: INTERNAL MEDICINE

## 2020-08-10 PROCEDURE — 2580000003 HC RX 258: Performed by: SURGERY

## 2020-08-10 PROCEDURE — 99024 POSTOP FOLLOW-UP VISIT: CPT | Performed by: SURGERY

## 2020-08-10 PROCEDURE — 80048 BASIC METABOLIC PNL TOTAL CA: CPT

## 2020-08-10 PROCEDURE — 97165 OT EVAL LOW COMPLEX 30 MIN: CPT

## 2020-08-10 PROCEDURE — 6370000000 HC RX 637 (ALT 250 FOR IP): Performed by: NURSE PRACTITIONER

## 2020-08-10 PROCEDURE — 2700000000 HC OXYGEN THERAPY PER DAY

## 2020-08-10 PROCEDURE — 85610 PROTHROMBIN TIME: CPT

## 2020-08-10 PROCEDURE — 2500000003 HC RX 250 WO HCPCS: Performed by: NURSE PRACTITIONER

## 2020-08-10 RX ORDER — AMLODIPINE BESYLATE 5 MG/1
5 TABLET ORAL DAILY
Qty: 30 TABLET | Refills: 3 | Status: SHIPPED | OUTPATIENT
Start: 2020-08-11 | End: 2020-08-13 | Stop reason: ALTCHOICE

## 2020-08-10 RX ADMIN — SOTALOL HYDROCHLORIDE 80 MG: 80 TABLET ORAL at 09:02

## 2020-08-10 RX ADMIN — PAROXETINE HYDROCHLORIDE 10 MG: 20 TABLET, FILM COATED ORAL at 09:00

## 2020-08-10 RX ADMIN — SODIUM CHLORIDE: 9 INJECTION, SOLUTION INTRAVENOUS at 10:05

## 2020-08-10 RX ADMIN — PIPERACILLIN AND TAZOBACTAM 3.38 G: 3; .375 INJECTION, POWDER, FOR SOLUTION INTRAVENOUS at 08:59

## 2020-08-10 RX ADMIN — FAMOTIDINE 20 MG: 10 INJECTION INTRAVENOUS at 09:00

## 2020-08-10 RX ADMIN — AMLODIPINE BESYLATE 5 MG: 5 TABLET ORAL at 09:01

## 2020-08-10 RX ADMIN — PIPERACILLIN AND TAZOBACTAM 3.38 G: 3; .375 INJECTION, POWDER, FOR SOLUTION INTRAVENOUS at 00:21

## 2020-08-10 RX ADMIN — MAGNESIUM OXIDE 400 MG (241.3 MG MAGNESIUM) TABLET 200 MG: TABLET at 09:00

## 2020-08-10 NOTE — PROGRESS NOTES
CLINICAL PHARMACY NOTE: MEDS TO [address] Select Patient?: No  Total # of Prescriptions Filled: 1   The following medications were delivered to the patient:  · Amlodipine 5mg  Total # of Interventions Completed: 0  Time Spent (min): 15    Additional Documentation:

## 2020-08-10 NOTE — PROGRESS NOTES
Pt reports he had a large bowel movement. He reports it was soft, formed, and also liquid. He reports feeling \"much better. \"      Electronically signed by Charles Serrano RN on 8/10/20 at 4:21 AM EDT

## 2020-08-10 NOTE — PROGRESS NOTES
Occupational Therapy  [facility] OCCUPATIONAL THERAPY EVALUATION    History  [place]:  The encounter diagnosis was Perforated viscus. Restrictions:                           Communication with other providers: RN, PT    Subjective:  Patient states:  \"I have been walking lap\"  Pain:  4/10 abdom incision  Patient goal:  Home with wife    Occupational profile (relevant social history and personal factors):         Examination of body systems (includes body structures/functions, activity/participation limitations):  · Orientation: WFL   · Cognition:  WFL   · Observation:  Received pt sitting EOB. · [facility]   · Hearing:  WFL   · ROM:  WFL   · Strength: WFL   · Sensation: WFL     ADLs  INDEP or TIP all UB ADL , toileting, grooming, and feeding. Needs assistance with distal components of LB due to abdominal incision. Wife can assist with socks, shoes, threading of pants. *Some ADL determined per observation of actual ADL performance, functional mobility, balance, activity tolerance, and cognition. AM-PAC 6 click short form for inpatient daily activity:  Raw Score: 20  24/24 = unimpaired  23/24 = 1-20% impaired   20/24-22/24 = 21-40% impaired  15/24-19/24 = 41-59% impaired   10/24-14/24 = 60%-79% impaired  7/24-9/24 = 80%-99% impaired  6/24 = 100% impaired    Functional Mobility  INDEP bed mobility, transfers, and 400+ feet of gait. No instability or safety concerns. Activity tolerance  WFL . Near his baseline despite abdominal incision.     Assessment:  Assessment  Treatment Diagnosis: S/p ex lap, SEMAJ, small bowel resection  Decision Making: Low Complexity  REQUIRES OT FOLLOW UP: No  Discharge Recommendations: Home with assist PRN(wife can assist wtih LB ADL)          Goals:  By d/c or goals met:     n/a    Plan:  Plan  Times per week: n/a      Recommendation for activity with nursing staff:  INDEPENDENT    Treatment today:    None, eval only      Time in:  0916  Time out:  0926  Timed treatment minutes: 0  Total treatment time:  10    Electronically signed by:    [address] Manisha Angel, OTR/L, [facility]   [id number]   11:47 AM, 8/10/2020

## 2020-08-10 NOTE — PROGRESS NOTES
General Surgery-Dr. Jaime Posey Day: 5    Chief Complaint on Admission: perforated small bowel      Subjective:     Denies F/C. Denies pain. Tolerating fulls. +BM (multiple)  Walking labs in herrmann. Evaluated by OT. Wants to go home. ROS:  Review of Systems   Negative except as above. Allergies  Aspirin          Diagnosis Date    Abnormal EKG     Atrial fibrillation (HCC)     Depression     Family history of coronary artery disease     Mother-MI.    H/O cardiac catheterization 2019    No significant CAD.    H/O echocardiogram 2019    EF 55-60%, Mild septal wall asymmetrical LVH.  History of exercise stress test 2019    treadmill    History of nuclear stress test 2019    This is a normal study.  Hx of Venous Doppler ultrasound 10/14/2019    No DVT or SVT, Significant reflux in RCFV, RGSV, RSSV, LCFV, LGSV    Left arm pain 2019    Non-smoker     Obesity     Sleep apnea     TIA (transient ischemic attack)        Objective:     Vitals:    08/10/20 0858   BP: (!) 142/71   Pulse: 57   Resp: 16   Temp: 98.3 °F (36.8 °C)   SpO2:        TEMPERATURE:  Current -Temp: 98.3 °F (36.8 °C); Max - Temp  Av.4 °F (36.9 °C)  Min: 98.3 °F (36.8 °C)  Max: 98.6 °F (37 °C)    I/O this shift:  In: 600 [P.O.:600]  Out: - I/O last 3 completed shifts: In: 1000 [P.O.:360; I.V.:640]  Out: 340 [Urine:340]      Physical Exam:  Physical Exam  Vitals signs reviewed. Constitutional:       General: He is not in acute distress. Appearance: Normal appearance. He is not ill-appearing. HENT:      Head: Normocephalic and atraumatic. Nose:      Comments:        Mouth/Throat:      Mouth: Mucous membranes are dry. Eyes:      General:         Right eye: No discharge. Left eye: No discharge. Cardiovascular:      Rate and Rhythm: Normal rate. Pulmonary:      Effort: No respiratory distress. Abdominal:      Palpations: Abdomen is soft.       Tenderness: There is abdominal tenderness (appropriate and min). Comments:   No peritoneal signs. C/D/I     Musculoskeletal: Normal range of motion. Skin:     General: Skin is warm. Neurological:      General: No focal deficit present. Mental Status: He is alert. Scheduled Meds:   amLODIPine  5 mg Oral Daily    warfarin  7.5 mg Oral Daily    magnesium oxide  200 mg Oral Daily    sodium chloride  20 mL Intravenous Once    sodium chloride flush  10 mL Intravenous 2 times per day    famotidine (PEPCID) injection  20 mg Intravenous BID    sotalol  80 mg Oral BID    PARoxetine  10 mg Oral QAM    piperacillin-tazobactam  3.375 g Intravenous Q8H     ContinuousInfusions:   sodium chloride 75 mL/hr at 08/10/20 1005     PRN Meds:sodium chloride flush, acetaminophen **OR** acetaminophen, polyethylene glycol, morphine **OR** morphine, ondansetron      Labs/Imaging Results:   Lab Results   Component Value Date    WBC 10.1 08/10/2020    HGB 14.8 08/10/2020    HCT 45.1 08/10/2020    MCV 98.5 08/10/2020     08/10/2020     Lab Results   Component Value Date     08/10/2020    K 4.0 08/10/2020     08/10/2020    CO2 29 08/10/2020    BUN 11 08/10/2020    CREATININE 0.9 08/10/2020    GLUCOSE 146 (H) 08/10/2020    CALCIUM 8.7 08/10/2020    PROT 6.2 (L) 08/07/2020    LABALBU 3.7 08/07/2020    BILITOT 1.9 (H) 08/07/2020    ALKPHOS 88 08/07/2020    AST 20 08/07/2020    ALT 29 08/07/2020    LABGLOM >60 08/10/2020    GFRAA >60 08/10/2020       Assessment:     70 y/o M POD 4 s/p ex lap, lysis of adhesions, small bowel resection with primary anastomosis for perforation, and application of wound vac    Plan:     -Soft regular diet     -OK to d/c Abx    -Ok for DVT, GI prophylaxis     -Ambulate, IS use    -F/u pathology. Pending.     -Removed prevena wound vac. Order placed for [address] [place] [address] for d/c home from General Surgery standpoint. F/u with me in office in 1 week.  D/w pt planned d/c instructions including:  -diet  -activity  -showering    Electronically signed by Alfredo Crum MD on 8/10/2020 at 11:56 AM

## 2020-08-10 NOTE — DISCHARGE SUMMARY
Discharge Summary    Name:  Sergio Elizabeth /Age/Sex:   [de-identified]71 y.o. male)   MRN & CSN:  [id number] & [id number] Admission Date/Time: 2020  9:46 AM   Attending:  Lorena Charles MD Discharging Physician: Lorena Charles MD     HPI:   41-year-old male who presents with history of perforated bowel who presents with sudden onset abdominal pain about 1 day prior to presentation, described as sharp constant and improved with pain medication in the ED. pain was not associated with nausea or vomiting no fever or chills. Hospital Course:   Sergio Elizabeth is a 71 y.o.  male  who presents with abdominal pain. Patient had exploratory laparotomy with extensive adhesolysis and small bowel resection. He had his NG tube removed, started tolerating meals. He had an episode of A. fib while on admission, heart rate remained controlled. Medications continued. He improved, moved his bowels, his meals were advanced, tolerating soft diet at discharge. He will follow-up with surgery in 1 week, his wound VAC was removed. No discharge on antibiotics per surgery    1.  Sepsis due to small bowel perforation status post exploratory laparotomy with extensive adhesion lysis, small bowel resection and anastomosis 2020 postop day 4  -leukocytosis trending down, monitor  -Vitals are stable  -Received IV Zosyn, analgesics and hydration  -Encourage spirometry  -Diet advanced as tolerated     2.  Permanent A. fib on Coumadin  -INR subtherapeutic, goal is 2-3  -Coumadin resumed at home dose dosed by pharmacy  -Sotalol resumed     3.  NORMA on CPAP     4.  Elevated blood pressure  -Not known to be hypertensive  -May be related to pain  -Started on amlodipine 5 mg daily and monitor, continue sotalol  -Discharged on amlodipine 5 and sotalol     4. Disposition, PT OT , home    The patient expressed appropriate understanding of and agreement with the discharge recommendations, medications, and plan.      Consults this admission:  [facility] CONSULT TO GENERAL SURGERY  IP CONSULT TO HOSPITALIST  IP CONSULT TO RESPIRATORY CARE  PHARMACY TO DOSE WARFARIN    Discharge Instruction:   Follow up appointments: 1 week  To surgery  Primary care physician:  within 2 weeks    Diet:  Soft diet  Activity: activity as tolerated  Disposition: Discharged to:   [x]Home, []HHC, []SNF, []Acute Rehab, []Hospice   Condition on discharge: Stable    Discharge Medications:      [facility]"   Home Medication Instructions AVF:[id number]    Printed on:08/10/20 7471   Medication Information                      amLODIPine (NORVASC) 5 MG tablet  Take 1 tablet by mouth daily             magnesium oxide (MAG-OX) 400 (241.3 Mg) MG TABS tablet  Take 0.5 tablets by mouth daily             PARoxetine (PAXIL) 10 MG tablet  Take 1 tablet by mouth every morning             sotalol (BETAPACE) 80 MG tablet  Take 1 tablet by mouth 2 times daily             warfarin (COUMADIN) 6 MG tablet  TAKE 1 TABLET BY MOUTH EVERY OTHER DAY             warfarin (COUMADIN) 7.5 MG tablet  TAKE 1 TABLET BY MOUTH EVERY OTHER DAY                 Objective Findings at Discharge:   BP (!) 142/71   Pulse 57   Temp 98.3 °F (36.8 °C) (Oral)   Resp 16   Ht 5' 10\" (1.778 m)   Wt 269 lb 8 oz (122.2 kg)   SpO2 94%   BMI 38.67 kg/m²            PHYSICAL EXAM   GEN Awake male, sitting upright in bed in no apparent distress. Appears given age. EYES Pupils are equally round. No scleral erythema, discharge, or conjunctivitis. HENT Mucous membranes are moist.   NECK No apparent thyromegaly or masses. RESP Clear to auscultation, no wheezes, rales or rhonchi. Symmetric chest movement while on room air. CARDIO/VASC S1/S2 auscultated. Regular rate without appreciable murmurs, rubs, or gallops. Peripheral pulses equal bilaterally and palpable. No peripheral edema. GI Abdomen is soft without significant tenderness, masses, or guarding. Bowel sounds are normoactive. Rectal exam deferred.   Clean dressings Peritoneum/Retroperitoneum: No free fluid. No lymphadenopathy. Vascular: Retroaortic left renal vein, anatomic variant. Bones/Soft Tissues: No acute osseous abnormality. Contained perforation involving a segment of mid small bowel in the left abdomen with significant adjacent mesenteric fat stranding. Critical results were called by Dr. Jaime Mann to [facility] on 8/6/2020 at 13:16. Xr Abdomen (2 Views)    Result Date: 8/6/2020  EXAMINATION: TWO XRAY VIEWS OF THE ABDOMEN 8/6/2020 10:02 am COMPARISON: None. HISTORY: ORDERING SYSTEM PROVIDED HISTORY: abd pain TECHNOLOGIST PROVIDED HISTORY: Reason for exam:->abd pain Reason for Exam: pain distended bowel hx of bowel perforation check for free air Initial encounter FINDINGS: No free air on the upright image. Mild bibasilar atelectasis. No air-filled dilated loops of bowel. There is overall paucity of bowel gas within the abdomen. Surgical clips in the right upper quadrant. No acute osseous abnormality or pathologic calcifications. No free air on the upright image. Overall paucity of bowel gas within the abdomen.      Discharge Time of 35 minutes    Electronically signed by Omid Beltran MD on 8/10/2020 at 1:38 PM

## 2020-08-10 NOTE — DISCHARGE INSTR - COC
Continuity of Care Form    Patient Name: Abel Teague   :    MRN:  [id number]    Admit date:  2020  Discharge date:  ***    Code Status Order: Full Code   Advance Directives:   Advance Care Flowsheet Documentation     Date/Time Healthcare Directive Type of Healthcare Directive Copy in [address] Agent's Name Healthcare Agent's Phone Number    20 1732  No, patient does not have an advance directive for healthcare treatment -- -- -- -- --          Admitting Physician:  Kareem Lyons MD  PCP: Juan Jose Franco MD    Discharging Nurse: [facility] Unit/Room#: 1110/1110-A  Discharging Unit Phone Number: ***    Emergency Contact:   Extended Emergency Contact Information  Primary Emergency Contact: Twin Wright  Home Phone: [phone]  Relation: Spouse  Secondary Emergency Contact: Marybel Scott, [address] Phone: [phone]  Relation: Child    Past Surgical History:  Past Surgical History:   Procedure Laterality Date    ABDOMEN SURGERY  2019    bowel perforation repair    CHOLECYSTECTOMY      FOOT SURGERY Bilateral     LAPAROTOMY N/A 2020    LAPAROTOMY EXPLORATORY WITH SMALL BOWEL OBSTRUCTION AND PRIMARY ANASTOMOSIS AND EXTENSIVE LYSIS OF ADHESIONS performed by Janna Barrera MD at [address] [place] [address]  2017    compartment syndrome right leg    TONSILLECTOMY         Immunization History:   Immunization History   Administered Date(s) Administered    PPD Test 03/10/2020, 2020       Active Problems:  Patient Active Problem List   Diagnosis Code    Essential hypertension I10    Depression F32.9    Atrial fibrillation ([address] I48.91    Sleep apnea G47.30    S/P compartment syndrome decompression Z98.890    Anticoagulated on Coumadin Z79.01    TIA (transient ischemic attack) G45.9    Leg swelling M79.89    Left arm pain M79.602    Precordial pain R07.2    A-fib ([address] I48.91    Encounter for monitoring sotalol therapy Z51.81, Z79.899    Bowel perforation ([facility]) K63.1       Isolation/Infection:   Isolation          No Isolation        Patient Infection Status     None to display          Nurse Assessment:  Last Vital Signs: BP (!) 142/71   Pulse 57   Temp 98.3 °F (36.8 °C) (Oral)   Resp 16   Ht 5' 10\" (1.778 m)   Wt 269 lb 8 oz (122.2 kg)   SpO2 94%   BMI 38.67 kg/m²     Last documented pain score (0-10 scale): Pain Level: 7  Last Weight:   Wt Readings from Last 1 Encounters:   08/09/20 269 lb 8 oz (122.2 kg)     Mental Status:  {IP PT MENTAL STATUS:20030}    IV Access:  { BECKA IV ACCESS:020438166}    Nursing Mobility/ADLs:  Walking   {P DME IOYX:346451623}  Transfer  {P DME MNAW:747921799}  Bathing  {P DME QHDK:843337748}  Dressing  {P DME YRZD:511011789}  Toileting  {P DME XJLI:061165996}  Feeding  {[facility] DME FZGS:269210672}  Med Admin  {[facility] DME OBNA:768098841}  Med Delivery   { BECKA MED Delivery:346471272}    Wound Care Documentation and Therapy:        Elimination:  Continence:   · Bowel: {YES / AU:77100}  · Bladder: {YES / AA:34907}  Urinary Catheter: {Urinary Catheter:024528948}   Colostomy/Ileostomy/Ileal Conduit: {YES / JV:53993}       Date of Last BM: ***    Intake/Output Summary (Last 24 hours) at 8/10/2020 1336  Last data filed at 8/10/2020 0906  Gross per 24 hour   Intake 1480 ml   Output 240 ml   Net 1240 ml     I/O last 3 completed shifts:   In: 1000 [P.O.:360; I.V.:640]  Out: 0 [Urine:340]    Safety Concerns:     [employer] BECKA Safety Concerns:492366596}    Impairments/Disabilities:      [employer] Impairments/Disabilities:001041798}    Nutrition Therapy:  Current Nutrition Therapy:   [employer] Diet List:496998694}    Routes of Feeding: {P DME Other Feedings:393082127}  Liquids: {Slp liquid thickness:11452}  Daily Fluid Restriction: {CHP DME Yes amt example:980548943}  Last Modified Barium Swallow with Video (Video Swallowing Test): {Done Not Done [facility]:580213450}    Treatments at the Time of Hospital Discharge:   Respiratory Treatments: ***  Oxygen Therapy:  {Therapy; copd oxygen:77286}  Ventilator:    {MH CC Vent XANV:289801828}    Rehab Therapies: {THERAPEUTIC INTERVENTION:6653123982}  Weight Bearing Status/Restrictions: 50Johnson ARIAS Weight Bearin}  Other Medical Equipment (for information only, NOT a DME order):  {EQUIPMENT:067412076}  Other Treatments: ***    Patient's personal belongings (please select all that are sent with patient):  {P DME Belongings:586783268}    RN SIGNATURE:  {Esignature:113595926}    CASE MANAGEMENT/SOCIAL WORK SECTION    Inpatient Status Date: ***    Readmission Risk Assessment Score:  Readmission Risk              Risk of Unplanned Readmission:        10           Discharging to Facility/ Agency   · Name:   · Address:  · Phone:  · Fax:    Dialysis Facility (if applicable)   · Name:  · Address:  · Dialysis Schedule:  · Phone:  · Fax:    / signature: {Esignature:981498913}    PHYSICIAN SECTION    Prognosis: {Prognosis:0950355055}    Condition at Discharge: [address] Patient Condition:969939287}    Rehab Potential (if transferring to Rehab): {Prognosis:3363553043}    Recommended Labs or Other Treatments After Discharge: ***    Physician Certification: I certify the above information and transfer of Jaswinder Garland  is necessary for the continuing treatment of the diagnosis listed and that he requires {Admit to Appropriate Level of Care:98193} for {GREATER/LESS:891501130} 30 days.      Update Admission H&P: {CHP DME Changes in VQODT:655800100}    PHYSICIAN SIGNATURE:  {Esignature:545389306}

## 2020-08-10 NOTE — CONSULTS
[address] [facility],[address] ACUTE CARE PHYSICAL THERAPY EVALUATION  Aurora Stevens, 1955, 1110/1110-A, 8/10/2020    History  [place]:  The encounter diagnosis was Perforated viscus. Patient  has a past medical history of Abnormal EKG, Atrial fibrillation ([address] Depression, Family history of coronary artery disease, H/O cardiac catheterization, H/O echocardiogram, History of exercise stress test, History of nuclear stress test, Hx of Venous Doppler ultrasound, Left arm pain, Non-smoker, Obesity, Sleep apnea, and TIA (transient ischemic attack). Patient  has a past surgical history that includes Cholecystectomy; Leg Surgery (2017); Abdomen surgery (02/2019); Tonsillectomy; Foot surgery (Bilateral); and laparotomy (N/A, 8/6/2020). Subjective:  Patient states:  \"I've been walking\". Pain:  2/10, abdomen. Communication with other providers:  Handoff to RN, co-eval with OT. 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 Examination of body systems (includes body structures/functions, activity/participation limitations):  · Observation:  Sitting EOB upon arrival  · [facility]  · Hearing:  [facility]  · Cardiopulmonary:  No O2 needs  · Cognition: WFL, see OT/SLP note for further evaluation. Musculoskeletal  · ROM R/L:  WFL. · Strength R/L:  5/5, no weakness in function and endurance. · Neuro:  [facility]      Mobility:  · Supine to sit:  Mod I  · Transfers: Mod I  · Sitting balance: Mod I.    · Standing balance: Mod I.    · Gait: Mod I    [facility] 6 Clicks Inpatient Mobility:  24/24    Treatment:  Ambulated x400ft in hallway. Safety: patient left on couch, call light within reach, RN notified, gait belt used. Assessment:  Patient is a 70 yo male who presents s/p POD 4 ex lap, small bowel resection and application of wound vac. Demonstrates safe mobility, ambulating self in hallway after evaluation.   Rec d/c home ind.     Complexity: Moderate  Prognosis: Good, no significant barriers to participation at this time.      Equipment: none    Time:   Time in: 0918  Time out: 0925  Timed treatment minutes: 0  Total time: 7    Electronically signed by:    Eliza Stringer, PT  8/10/2020, 1:39 PM

## 2020-08-10 NOTE — PROGRESS NOTES
PHARMACY ANTICOAGULATION MONITORING SERVICE    Drea Castro is a 71 y.o. male on warfarin therapy for Atril Fibrillation. Pharmacy consulted by Dr. Sophia Cannon for monitoring and adjustment of treatment. Indication for anticoagulation: Atrial Fibrillation  INR goal: 2 - 3  Warfarin dose prior to admission: 7.5mg/6mg alternating every other day    Pertinent Laboratory Values   Recent Labs     08/08/20  0730 08/09/20  0554 08/10/20  0733   INR 1.36 1.15 1.24   HGB 14.5 16.1 14.8   HCT 44.1 52.0 45.1    196 230       Assessment/Plan:   Drug Interactions: no new, warfarin-interacting medications started   INR = 1.24, sub-therapeutic, following vitamin K and held doses.  Warfarin held x2 days on 08/06 and 08/07 prior to surgery.  Patient also received vitamin K 10 mg IVPB x1 dose on 08/06.  Continue warfarin 7.5 mg daily.   [address] [place] [address] will continue to monitor and adjust warfarin therapy as indicated    Thank you for the consult,  Pratima Christopher RPh  8/10/2020 1:54 PM

## 2020-08-10 NOTE — CARE COORDINATION
CM reviewed chart and saw pt. Pt lives at home w/ spouse and reports being fully independent including driving. Pt has insurance/PCP and can afford Rx. Pt states he has been up and amb in the hallway and doing stairs. Pt's stated goal is to d/c to home. Pt does have disposable wound vac in place. Anticipated no needs and f/u w/ Md's.   CM available if needed

## 2020-08-12 ENCOUNTER — TELEPHONE (OUTPATIENT)
Dept: BARIATRICS/WEIGHT MGMT | Age: 65
End: 2020-08-12

## 2020-08-12 ENCOUNTER — TELEPHONE (OUTPATIENT)
Dept: CARDIOLOGY CLINIC | Age: 65
End: 2020-08-12

## 2020-08-12 NOTE — TELEPHONE ENCOUNTER
Wife called and states pt was placed on Amlodipine 5 mg states pt  Is acting different very sad very tired. Wife wants to make sure it is ok to take this med. Also not eating very well. Please advise.   Call wife [phone]

## 2020-08-12 NOTE — TELEPHONE ENCOUNTER
Patient's wife called. Les Jay had surgery with Dr. Alfredo Hinojosa on 8/6 Small bowel resection and exp lap. Patient was discharged home yesterday, however they do not have any wound care instructions.  Please advise

## 2020-08-13 ENCOUNTER — OFFICE VISIT (OUTPATIENT)
Dept: CARDIOLOGY CLINIC | Age: 65
End: 2020-08-13
Payer: MEDICARE

## 2020-08-13 VITALS
SYSTOLIC BLOOD PRESSURE: 124 MMHG | HEART RATE: 60 BPM | DIASTOLIC BLOOD PRESSURE: 80 MMHG | WEIGHT: 258.6 LBS | BODY MASS INDEX: 37.02 KG/M2 | HEIGHT: 70 IN

## 2020-08-13 PROBLEM — Z79.01 CHRONIC ANTICOAGULATION: Status: ACTIVE | Noted: 2020-08-13

## 2020-08-13 PROCEDURE — 3017F COLORECTAL CA SCREEN DOC REV: CPT | Performed by: NURSE PRACTITIONER

## 2020-08-13 PROCEDURE — 1111F DSCHRG MED/CURRENT MED MERGE: CPT | Performed by: NURSE PRACTITIONER

## 2020-08-13 PROCEDURE — 1123F ACP DISCUSS/DSCN MKR DOCD: CPT | Performed by: NURSE PRACTITIONER

## 2020-08-13 PROCEDURE — 99214 OFFICE O/P EST MOD 30 MIN: CPT | Performed by: NURSE PRACTITIONER

## 2020-08-13 PROCEDURE — G8417 CALC BMI ABV UP PARAM F/U: HCPCS | Performed by: NURSE PRACTITIONER

## 2020-08-13 PROCEDURE — 1036F TOBACCO NON-USER: CPT | Performed by: NURSE PRACTITIONER

## 2020-08-13 PROCEDURE — 4040F PNEUMOC VAC/ADMIN/RCVD: CPT | Performed by: NURSE PRACTITIONER

## 2020-08-13 PROCEDURE — G8428 CUR MEDS NOT DOCUMENT: HCPCS | Performed by: NURSE PRACTITIONER

## 2020-08-13 ASSESSMENT — ENCOUNTER SYMPTOMS
DIARRHEA: 0
SHORTNESS OF BREATH: 0
ABDOMINAL DISTENTION: 0
ABDOMINAL PAIN: 0
SINUS PAIN: 0
EYE ITCHING: 0
SORE THROAT: 0
CONSTIPATION: 0
COLOR CHANGE: 0
ROS SKIN COMMENTS: MIDLINE ABDOMINAL INCISION
CHEST TIGHTNESS: 0
NAUSEA: 0
VOMITING: 0
BLOOD IN STOOL: 0
BACK PAIN: 0
EYE REDNESS: 0
SINUS PRESSURE: 0

## 2020-08-13 NOTE — PROGRESS NOTES
CARDIOLOGY  NOTE      8/13/2020    RE: Abel Teague  ([birth year])                               TO:  Dr. Juan Jose Franco MD  The primary cardiologist is Dr Faith Connelly    CC:  Denies the following:  Chest Pain  Palpitations  Shortness of Breath  Edema  Dizziness  Syncope    Here to discuss Norvasc that was started while in the hospital recently    HPI: Thank you for involving me in taking care of your patient Abel Teague, who is a  71y.o. year old male with a history of atrial fibrillation, hypertension, status post sotalol monitoring therapy, chronic anticoagulation. He is seen today for a follow up on with questions regarding amlodipine. Patient reports that he was recently admitted with abdominal pain. He had to have a small bowel resection. During that time he had an episode of A. fib. Additionally he was started on amlodipine for blood pressure control at that time. He states that the blood pressure medication is making him feel   \"funny. \"  He states that he is never really had a issue with blood pressure. He feels that his blood pressure was elevated in the hospital due to the surgery, having an NG tube, and having pain. He has not taken the medication for the last few days. He states that his blood pressure has been normal.  He does check his blood pressure daily. He also is looking for a new PCP because his PCP is no longer with the network. He needs someone to manage his Coumadin. He during this visit  denies chest pain, palpitations, shortness of breath, edema, dizziness or syncope.       Vitals:    08/13/20 0822   BP: 124/80   Pulse: 60       Current Outpatient Medications   Medication Sig Dispense Refill    Omega-3 Fatty Acids (FISH OIL PO) Take by mouth      warfarin (COUMADIN) 7.5 MG tablet TAKE 1 TABLET BY MOUTH EVERY OTHER DAY 45 tablet 3    PARoxetine (PAXIL) 10 MG tablet Take 1 tablet by mouth every morning 90 tablet 1    sotalol (BETAPACE) 80 MG tablet Take 1 tablet by mouth 2 times daily 60 tablet 5    warfarin (COUMADIN) 6 MG tablet TAKE 1 TABLET BY MOUTH EVERY OTHER DAY 90 tablet 1    magnesium oxide (MAG-OX) 400 (241.3 Mg) MG TABS tablet Take 0.5 tablets by mouth daily (Patient taking differently: Take 400 mg by mouth daily ) 30 tablet 0     No current facility-administered medications for this visit. Allergies: Aspirin  Past Medical History:   Diagnosis Date    Abnormal EKG     Atrial fibrillation (HCC) 1986    Depression     Family history of coronary artery disease     Mother-MI.    H/O cardiac catheterization 11/12/2019    No significant CAD.    H/O echocardiogram 09/11/2019    EF 55-60%, Mild septal wall asymmetrical LVH.  History of exercise stress test 09/09/2019    treadmill    History of nuclear stress test 09/11/2019    This is a normal study.     Hx of Venous Doppler ultrasound 10/14/2019    No DVT or SVT, Significant reflux in RCFV, RGSV, RSSV, LCFV, LGSV    Left arm pain 09/09/2019    Non-smoker     Obesity     Sleep apnea     TIA (transient ischemic attack) 2008     Past Surgical History:   Procedure Laterality Date    ABDOMEN SURGERY  02/2019    bowel perforation repair    CHOLECYSTECTOMY      FOOT SURGERY Bilateral     LAPAROTOMY N/A 8/6/2020    LAPAROTOMY EXPLORATORY WITH SMALL BOWEL OBSTRUCTION AND PRIMARY ANASTOMOSIS AND EXTENSIVE LYSIS OF ADHESIONS performed by Max Baker MD at [address] [place] [address]  2017    compartment syndrome right leg    TONSILLECTOMY       Family History   Problem Relation Age of Onset    Heart Attack Mother     Cancer Mother         ovarian    Other Mother         dementia    Other Father         murdered    Cancer Father         prostate cancer     Social History     Tobacco Use    Smoking status: Never Smoker    Smokeless tobacco: Never Used   Substance Use Topics    Alcohol use: Not Currently        Review of Systems   Constitutional: Positive for fatigue. Negative for activity change and appetite change. HENT: Negative for congestion, sinus pressure, sinus pain and sore throat. Eyes: Negative for redness and itching. Respiratory: Negative for chest tightness and shortness of breath. Cardiovascular: Negative for chest pain, palpitations and leg swelling. Gastrointestinal: Negative for abdominal distention, abdominal pain, blood in stool, constipation, diarrhea, nausea and vomiting. Genitourinary: Negative for difficulty urinating and dysuria. Musculoskeletal: Positive for arthralgias and gait problem. Negative for back pain. Skin: Negative for color change, pallor, rash and wound. Midline abdominal incision   Neurological: Negative for dizziness, speech difficulty and light-headedness. Psychiatric/Behavioral: Negative for confusion. The patient is not nervous/anxious. Objective:      Physical Exam:  /80   Pulse 60   Ht 5' 10\" (1.778 m)   Wt 258 lb 9.6 oz (117.3 kg)   BMI 37.11 kg/m²   Wt Readings from Last 3 Encounters:   08/13/20 258 lb 9.6 oz (117.3 kg)   08/09/20 269 lb 8 oz (122.2 kg)   06/19/20 262 lb 6.4 oz (119 kg)     Body mass index is 37.11 kg/m². Physical Exam  Constitutional:       Appearance: Normal appearance. He is obese. He is not ill-appearing or diaphoretic. HENT:      Head: Normocephalic and atraumatic. Right Ear: External ear normal.      Left Ear: External ear normal.      Nose: No congestion or rhinorrhea. Mouth/Throat:      Mouth: Mucous membranes are moist.      Pharynx: Oropharynx is clear. No oropharyngeal exudate. Eyes:      General:         Right eye: No discharge. Left eye: No discharge. Conjunctiva/sclera: Conjunctivae normal.      Pupils: Pupils are equal, round, and reactive to light. Neck:      Musculoskeletal: Neck supple. No muscular tenderness. Vascular: No carotid bruit.    Cardiovascular:      Rate and Rhythm: Normal rate and regular rhythm. Pulses: Normal pulses. Heart sounds: Normal heart sounds. No murmur. No friction rub. No gallop. Pulmonary:      Effort: Pulmonary effort is normal.      Breath sounds: Normal breath sounds. No stridor. No wheezing, rhonchi or rales. Abdominal:      General: Abdomen is flat. Bowel sounds are normal. There is no distension. Palpations: There is no mass. Tenderness: There is no abdominal tenderness. Musculoskeletal:      Right lower leg: No edema. Left lower leg: No edema. Skin:     General: Skin is warm and dry. Capillary Refill: Capillary refill takes less than 2 seconds. Neurological:      Mental Status: He is alert and oriented to person, place, and time. Psychiatric:         Mood and Affect: Mood normal.         Behavior: Behavior normal.         Thought Content: Thought content normal.         Judgment: Judgment normal.         DATA:    Lab Results   Component Value Date    ALT 29 08/07/2020    AST 20 08/07/2020     TSH:      Assessment/ Plan:    Patient seen, interviewed and examined. Testing was reviewed. Hypertension  Vitals:    08/13/20 0822   BP: 124/80   Pulse: 60     Pressure is stable. Will discontinue amlodipine  Patient to monitor blood pressure at home. If he consistently sees his systolic pressure 722 or higher or diastolic pressure greater than 100 he is to call the office. Persistent atrial fibrillation  Regular rate and rhythm noted  He recently had an event monitor placed pending per Dr. Donna Devine reading  Continue sotalol and Coumadin    Chronic anticoagulation  Referral sent to Coumadin clinic for management of Coumadin    Patient to follow-up with Dr. Sabi Cantu as scheduled    Patient is encouraged to exercise even a brisk walk for 30 minutes at least 3 to 4 times a week. Lifestyle and risk factor modificatons discussed. Various goals are discussed and questions answered. Continue current medications.  Appropriate prescriptions are

## 2020-08-14 PROCEDURE — 93228 REMOTE 30 DAY ECG REV/REPORT: CPT | Performed by: INTERNAL MEDICINE

## 2020-08-17 ENCOUNTER — TELEPHONE (OUTPATIENT)
Dept: CARDIOLOGY CLINIC | Age: 65
End: 2020-08-17

## 2020-08-20 ENCOUNTER — TELEPHONE (OUTPATIENT)
Dept: PHARMACY | Age: 65
End: 2020-08-20

## 2020-08-20 ENCOUNTER — OFFICE VISIT (OUTPATIENT)
Dept: BARIATRICS/WEIGHT MGMT | Age: 65
End: 2020-08-20

## 2020-08-20 VITALS
BODY MASS INDEX: 36.32 KG/M2 | DIASTOLIC BLOOD PRESSURE: 86 MMHG | HEART RATE: 60 BPM | HEIGHT: 70 IN | SYSTOLIC BLOOD PRESSURE: 114 MMHG | OXYGEN SATURATION: 98 % | WEIGHT: 253.7 LBS | TEMPERATURE: 96.2 F

## 2020-08-20 PROCEDURE — 99024 POSTOP FOLLOW-UP VISIT: CPT | Performed by: SURGERY

## 2020-08-20 RX ORDER — SOTALOL HYDROCHLORIDE 80 MG/1
80 TABLET ORAL 2 TIMES DAILY
Qty: 60 TABLET | Refills: 5 | Status: SHIPPED | OUTPATIENT
Start: 2020-08-20 | End: 2021-03-03

## 2020-08-20 NOTE — PROGRESS NOTES
Post-Operative Clinic Note    Chief Complaint   Patient presents with    Post-Op Check     1st P/O Exp Lap, Small bowel resection, Wound vac, 8/6/20         SUBJECTIVE:  Patient is here today for a post-operative visit. Patient is s/p:  1. Exploratory laparotomy. 2.  Extensive lysis of adhesions over 60 minutes. 3.  Small bowel resection with primary anastomosis. 4.  Application of Prevena wound vac     on 8/6/2020 for contained perforated small bowel diverticulitis . No complaints. Tolerating PO. Regular BMs. Denies abdominal pain. States he is regaining his strength. Past Surgical History:   Procedure Laterality Date    ABDOMEN SURGERY  02/2019    bowel perforation repair    CHOLECYSTECTOMY      FOOT SURGERY Bilateral     LAPAROTOMY N/A 8/6/2020    LAPAROTOMY EXPLORATORY WITH SMALL BOWEL OBSTRUCTION AND PRIMARY ANASTOMOSIS AND EXTENSIVE LYSIS OF ADHESIONS performed by Mildred Ivory MD at [address] [place] [address]  2017    compartment syndrome right leg    TONSILLECTOMY       Past Medical History:   Diagnosis Date    Abnormal EKG     Atrial fibrillation (HCC) 1986    Depression     Family history of coronary artery disease     Mother-MI.    H/O cardiac catheterization 11/12/2019    No significant CAD.    H/O echocardiogram 09/11/2019    EF 55-60%, Mild septal wall asymmetrical LVH.  History of cardiac monitoring [id number]    30 Day monitor: Sinus rhythm noted. Minimum hr 46 bpm, Average hr 57 bpm, Max  bpm, atrial fibrillation and flutter episodes noted PAF episodes. In atrial arrhythmia average HR was noted to be 64 bpm. Longest was 20 minutes. [place] is 1%      History of exercise stress test 09/09/2019    treadmill    History of nuclear stress test 09/11/2019    This is a normal study.     Hx of Venous Doppler ultrasound 10/14/2019    No DVT or SVT, Significant reflux in RCFV, RGSV, RSSV, LCFV, LGSV    Left arm pain 09/09/2019    Non-smoker     Obesity     Sleep apnea     TIA (transient ischemic attack) 2008     Family History   Problem Relation Age of Onset    Heart Attack Mother     Cancer Mother         ovarian    Other Mother         dementia    Other Father         murdered    Cancer Father         prostate cancer     Social History     Socioeconomic History    Marital status:      Spouse name: Not on file    Number of children: Not on file    Years of education: Not on file    Highest education level: Not on file   Occupational History    Not on file   Social Needs    Financial resource strain: Not on file    Food insecurity     Worry: Not on file     Inability: Not on file    Transportation needs     Medical: Not on file     Non-medical: Not on file   Tobacco Use    Smoking status: Never Smoker    Smokeless tobacco: Never Used   Substance and Sexual Activity    Alcohol use: Not Currently    Drug use: Never    Sexual activity: Not on file   Lifestyle    Physical activity     Days per week: Not on file     Minutes per session: Not on file    Stress: Not on file   Relationships    Social connections     Talks on phone: Not on file     Gets together: Not on file     Attends [employer] witness service: Not on file     Active member of club or organization: Not on file     Attends meetings of clubs or organizations: Not on file     Relationship status: Not on file    Intimate partner violence     Fear of current or ex partner: Not on file     Emotionally abused: Not on file     Physically abused: Not on file     Forced sexual activity: Not on file   Other Topics Concern    Not on file   Social History Narrative    Not on file       OBJECTIVE:  Physical Exam  Vitals signs reviewed. Constitutional:       General: He is not in acute distress. Appearance: Normal appearance. He is not ill-appearing, toxic-appearing or diaphoretic. HENT:      Head: Normocephalic and atraumatic. Eyes:      General:         Right eye: No discharge.          Left eye: No discharge. Extraocular Movements: Extraocular movements intact. Neck:      Musculoskeletal: Normal range of motion. Cardiovascular:      Rate and Rhythm: Normal rate. Pulmonary:      Effort: No respiratory distress. Abdominal:      Palpations: Abdomen is soft. Tenderness: There is no abdominal tenderness. Comments: Staples in place. Non tender. Healing appropriately. Neurological:      Mental Status: He is alert. Pt does have an approx 1 cm x 2 cm brown elevated lesion on right side of abdomen. States has been present for years but increasing in size recently. Pathology report reveals:   Final Pathologic Diagnosis:   Segment of small intestine:              Chronic active diverticulitis.          Mesenteric abscess with acute inflammation, necrosis,   and cavity, associated with the             above.        Focal acute peritonitis with adhesion.        Fibrous adhesions. ASSESSMENT:    1. Postop check        PLAN:  1. Reviewed pathology report  2. Diet - as tolerated. 3. Activity - increase. Full at 4 weeks. 4. Follow up - in 4-6 weeks to discuss excision of skin lesion on pt's abdomen. 5. Call with any questions or concerns. Pt agreeable to above plan. No orders of the defined types were placed in this encounter. No orders of the defined types were placed in this encounter. Follow Up: No follow-ups on file.     Shanika Garcia MD

## 2020-08-20 NOTE — TELEPHONE ENCOUNTER
New patient. Scheduled for 8/31 at 1:30pm. Provided clinic contact info.      CLINICAL PHARMACY CONSULT: MED RECONCILIATION/REVIEW ADDENDUM    For Pharmacy Admin Tracking Only    PHSO: Yes  Total # of Interventions Recommended: 0  Total Interventions Accepted: 0  Time Spent (min): 15    Petra Vasques, JanessaD

## 2020-08-21 ENCOUNTER — VIRTUAL VISIT (OUTPATIENT)
Dept: CARDIOLOGY CLINIC | Age: 65
End: 2020-08-21
Payer: MEDICARE

## 2020-08-21 PROCEDURE — 99442 PR PHYS/QHP TELEPHONE EVALUATION 11-20 MIN: CPT | Performed by: INTERNAL MEDICINE

## 2020-08-21 ASSESSMENT — ENCOUNTER SYMPTOMS
BACK PAIN: 0
ABDOMINAL PAIN: 0
DIARRHEA: 0
EYE PAIN: 0
COUGH: 0
BLOOD IN STOOL: 0
PHOTOPHOBIA: 0
WHEEZING: 0
CHEST TIGHTNESS: 0
SHORTNESS OF BREATH: 0
CONSTIPATION: 0
VOMITING: 0
COLOR CHANGE: 0
NAUSEA: 0

## 2020-08-21 NOTE — PROGRESS NOTES
Electrophysiology FU Note      Due to the current efforts to prevent transmission of COVID-19 and also the need to preserve PPE for other caregivers, a face-to-face encounter with the patient was not performed. We performed telephone visit which was initiated by the patient. Physical examination was not performed as this is telephone encounter      Roby Hunter is a 76 y.o. male evaluated via telephone on 8/21/2020. Consent:  He and/or health care decision maker is aware that that he may receive a bill for this telephone service, depending on his insurance coverage, and has provided verbal consent to proceed: Yes        Reason for consultation:  Atrial fibrillation    Chief complaint :  Palpitations    Referring physician:       Primary care physician: Shahida Meek MD      History of Present Illness: This visit (8/21/2020)      Chief Complaint   Patient presents with    Atrial Fibrillation     Pt is here for afib. Pt states while he was in surgery two week ago for surgery on a bowel he went into afib the next day. Lasted a day. Pt denies chest pain, shortness of breath, dizziness, and edema. Patient feels tired and weak and his endurance has decreased as mentioned in previous visits. He had event monitor and want to discuss results. Previous visit: (6/19/2020)      Chief Complaint   Patient presents with    Atrial Fibrillation     Patient denies chest pain, palpitations, dizziness, swelling, shortness of breath. Patient c/o continous feeling of fatigue. Patient states his blood pressure has been running low. Patient denies alcohol, caffiene. Previous visit:    Chief Complaint   Patient presents with    Atrial Fibrillation     Consult for AFIB. He denies any chest pain,SOB,dizziness, some swelling to legs due to past injury. Sometimes feels palpitations.          Pastmedical history:   Past Medical History:   Diagnosis Date  Abnormal EKG     Atrial fibrillation ([facility] [address] 1986    Depression     Family history of coronary artery disease     Mother-MI.    H/O cardiac catheterization 11/12/2019    No significant CAD.    H/O echocardiogram 09/11/2019    EF 55-60%, Mild septal wall asymmetrical LVH.  History of cardiac monitoring [id number]    30 Day monitor: Sinus rhythm noted. Minimum hr 46 bpm, Average hr 57 bpm, Max  bpm, atrial fibrillation and flutter episodes noted PAF episodes. In atrial arrhythmia average HR was noted to be 64 bpm. Longest was 20 minutes. [place] is 1%      History of exercise stress test 09/09/2019    treadmill    History of nuclear stress test 09/11/2019    This is a normal study.  Hx of Venous Doppler ultrasound 10/14/2019    No DVT or SVT, Significant reflux in RCFV, RGSV, RSSV, LCFV, LGSV    Left arm pain 09/09/2019    Non-smoker     Obesity     Sleep apnea     TIA (transient ischemic attack) 2008       Surgical history :   Past Surgical History:   Procedure Laterality Date    ABDOMEN SURGERY  02/2019    bowel perforation repair    CHOLECYSTECTOMY      FOOT SURGERY Bilateral     LAPAROTOMY N/A 8/6/2020    LAPAROTOMY EXPLORATORY WITH SMALL BOWEL OBSTRUCTION AND PRIMARY ANASTOMOSIS AND EXTENSIVE LYSIS OF ADHESIONS performed by Janna Barrera MD at [address] [place] [address]  2017    compartment syndrome right leg    TONSILLECTOMY         Family history:   Family History   Problem Relation Age of Onset    Heart Attack Mother     Cancer Mother         ovarian    Other Mother         dementia    Other Father         murdered    Cancer Father         prostate cancer       Social history :  reports that he has never smoked. He has never used smokeless tobacco. He reports previous alcohol use. He reports that he does not use drugs.     Allergies   Allergen Reactions    Aspirin        Current Outpatient Medications on File Prior to Visit   Medication Sig Dispense Refill    sotalol (BETAPACE) 80 MG tablet Take 1 tablet by mouth 2 times daily 60 tablet 5    Omega-3 Fatty Acids (FISH OIL PO) Take by mouth      warfarin (COUMADIN) 7.5 MG tablet TAKE 1 TABLET BY MOUTH EVERY OTHER DAY 45 tablet 3    PARoxetine (PAXIL) 10 MG tablet Take 1 tablet by mouth every morning 90 tablet 1    warfarin (COUMADIN) 6 MG tablet TAKE 1 TABLET BY MOUTH EVERY OTHER DAY 90 tablet 1    magnesium oxide (MAG-OX) 400 (241.3 Mg) MG TABS tablet Take 0.5 tablets by mouth daily (Patient taking differently: Take 400 mg by mouth daily ) 30 tablet 0     No current facility-administered medications on file prior to visit. Review of Systems:   Review of Systems   Constitutional: Positive for fatigue. Negative for activity change, chills and fever. HENT: Negative for congestion, ear pain and tinnitus. Eyes: Negative for photophobia, pain and visual disturbance. Respiratory: Negative for cough, chest tightness, shortness of breath and wheezing. Cardiovascular: Negative for chest pain, palpitations and leg swelling. Gastrointestinal: Negative for abdominal pain, blood in stool, constipation, diarrhea, nausea and vomiting. Endocrine: Negative for cold intolerance and heat intolerance. Genitourinary: Negative for dysuria, flank pain and hematuria. Musculoskeletal: Negative for arthralgias, back pain, myalgias and neck stiffness. Skin: Negative for color change and rash. Allergic/Immunologic: Negative for food allergies. Neurological: Negative for dizziness, light-headedness, numbness and headaches. Hematological: Does not bruise/bleed easily. Psychiatric/Behavioral: Negative for agitation, behavioral problems and confusion.            Examination:      Patient-Reported Vitals 8/21/2020   Patient-Reported Weight 249   Patient-Reported Height 5 10   Patient-Reported Systolic 663   Patient-Reported Diastolic 59   Patient-Reported Pulse 63      Physical examination was not performed as this is patient initiated telephone visit. Previous clinic examination as mentioned below    There were no vitals filed for this visit. There is no height or weight on file to calculate BMI. Physical Exam  Constitutional:       General: He is not in acute distress. Appearance: He is well-developed. HENT:      Head: Normocephalic and atraumatic. Eyes:      Pupils: Pupils are equal, round, and reactive to light. Neck:      Musculoskeletal: Normal range of motion. Vascular: No JVD. Cardiovascular:      Rate and Rhythm: Regular rhythm. Bradycardia present. Heart sounds: No murmur. No friction rub. Pulmonary:      Effort: Pulmonary effort is normal. No respiratory distress. Breath sounds: Normal breath sounds. No wheezing or rales. Abdominal:      General: Bowel sounds are normal. There is no distension. Palpations: Abdomen is soft. Tenderness: There is no abdominal tenderness. Musculoskeletal:         General: No tenderness. Skin:     General: Skin is warm and dry. Neurological:      Mental Status: He is alert and oriented to person, place, and time. Cranial Nerves: No cranial nerve deficit. CBC:   Lab Results   Component Value Date    WBC 10.1 08/10/2020    HGB 14.8 08/10/2020    HCT 45.1 08/10/2020     08/10/2020     Lipids:No results found for: CHOL, TRIG, HDL, LDLCALC, LDLDIRECT  PT/INR:   Lab Results   Component Value Date    INR 1.24 08/10/2020        BMP:    Lab Results   Component Value Date     08/10/2020    K 4.0 08/10/2020     08/10/2020    CO2 29 08/10/2020    BUN 11 08/10/2020     CMP:   Lab Results   Component Value Date    AST 20 08/07/2020    PROT 6.2 (L) 08/07/2020    BILITOT 1.9 (H) 08/07/2020    ALKPHOS 88 08/07/2020     TSH:  No results found for: TSH    EKGINTERPRETATION - EKG Interpretation:  SINUS BRADYCARDIA      IMPRESSION / RECOMMENDATIONS:     1. PAF  2. ? eXERCISE INDUCED paf  3. LVH   4.  TIA    Patient having atrial fibrillation episodes on event monitor  Discussed with the patient about atrial fibrillation ablation  Patient reports he wants to wait because he is going on some work-related move to [facility] [employer] [place] after Labor Day for at least 1 to 2 months and he will let us know when he is it okay to get the procedure    Continue sotalol and Coumadin for now      Documentation:  I communicated with the patient and/or health care decision maker about atrial fibrillation management. Details of this discussion including any medical advice provided: atrial fib management      I affirm this is a Patient Initiated Episode with a Patient who has not had a related appointment within my department in the past 7 days or scheduled within the next 24 hours. Patient identification was verified at the start of the visit: Yes    Total Time: minutes: 11-20 minutes    Note: not billable if this call serves to triage the patient into an appointment for the relevant concern      Thanks again for allowing me to participate in care of this patient. Please call me if you have any questions. With best regards. Yazan Mejias MD, 8/21/2020 8:54 AM     Please note this report has been partially produced using speech recognition software and may contain errors related to that system including errors in grammar, punctuation, and spelling, as well as words and phrases that may be inappropriate. If there are any questions or concerns please feel free to contact the dictating provider for clarification.

## 2020-08-25 ENCOUNTER — TELEPHONE (OUTPATIENT)
Dept: CARDIOLOGY CLINIC | Age: 65
End: 2020-08-25

## 2020-08-25 NOTE — TELEPHONE ENCOUNTER
Spoke with patient and scheduled a fib ablation w/CT with Dr Carmelita Walker for 9/24 @ 0730 arrival of 0600. Patient will be in next week or of 9/7 to get instructions for procedure.

## 2020-08-25 NOTE — LETTER
Jaqui Klein     PROCEDURE: Atrial Fibrillation  Ablation    Date of Procedure: 20  Time: 730  Arrival Time: 600    Patient Name: Josué Began  :    MRN# [id number]    HOSPITAL: [facility])    Call to Pre-Annabella at [phone]  2 days before your procedure    x Please have blood work and chest-x-ray done 20 at [facility], [address] [place] [address] or [facility].     x Please do not have anything by mouth after midnight prior to or 8 hours before the procedure.    x You may take your medications with a sip of water in the morning before your procedure or take them with you unless listed below. X  If taking COUMADIN it should be held 2-3 days prior to the procedure if INR is over 2.5. If INR is less than 2.5 or less then continue Coumadin. Call the Office @ [phone] ask for Maddi Devine after the blood work is completed for results and Instructions. X   Hold Sotolol (Betapace) the day before and the day of the procedure. X  Hold all Blood Pressure Medications morning of procedure    X  Please have COVID-19 Test done on 20 after labs and Xray are completed. You will need to Quarantine yourself until procedure. Patient Signature:  _______________________  Staff: Juan Diego Lorenzo     Staff Given Instructions:_______________________________                Jaqui Klein     PROCEDURE: Atrial Fibrillation  Ablation with transesophageal echocardiogram    Date of Procedure: 20 Time: 730 Arrival Time: 600    Patient Name: Josué Began  : [birth year]   MRN# [id number]    Day of Procedure Cath Lab Holding area Pre op Orders:    ? YOU HAVE MY PERMISSION TO TALK TO THE PATIENT-PLEASE   ? NPO  ? IF ORDERED FOR AFIB PATIENTS ONLY CARDIAC CT SCAN ANGIO (PULMONARY MAPPING)   ? Anesthesia  ?  NATE with Anesthesia ? IV peripheral saline lock x 2 sites  (at least one in preferred left arm). ? Type & Screen STAT on arrival.  ? ANTICOUGLATION:  COUMADIN   ? If taking Coumadin, PT INR  STAT on arrival day of procedure. ? Insert Kimball catheter (male patients >>please use coude kimball catheter). ? Female patients <=48years of age >> urine HCG test.  ? Diabetic patients >> Accu check Blood sugar check. ? Document home medications in EPIC and include date and time of last dose. ? Notify Dr. Eli Shelley of abnormal lab results. ? Chest Prep> Clip hair anterior chest and posterior back (clavicle to umbilicus). ? Groin Prep> Clip hair bilateral groins for femoral access (medial). Physician Signature:_________________________Date:____________Time:___________                                           [facility]) Informed Consent for Anesthesia/Sedation, Surgery, Invasive Procedures, and other High-risk Interventions and Medication use     *This consent is applicable for 30 days following patient signature*    Procedure(s)   ICata authorize, Dr. Megan Kingsley   and the associate(s) or assistant(s) of his/her choice, to perform the following procedure(s): Atrial Fibrillation  Ablation with transesophageal echocardiogram     I know that unexpected conditions may require additional or different procedures than those above. I authorize the above named practitioner(s) perform these as necessary and desirable. This is based on the practitioners professional judgment. The above named practitioner has discussed the above procedure(s) with me, including:  ? Potential benefits, including likelihood of success of the procedure(s) goals  ? Risks  ? Side effects, risk of death, and risk of infection  ? Any potential problems that might occur during recuperation or healing post-procedure  ? Reasonable alternatives  ?  Risks of NOT performing the procedure(s) I acknowledge that no warranty or guarantee has been made to the results the procedure(s). I consent to the above named practitioner(s) providing additional services to me as deemed reasonable and necessary, including but not limited to:    ? Use of medications for anesthesia or sedation. ? All anesthesia and sedation carry risks. My practitioner has discussed my anticipated anesthesia and/or sedation and the risks of using, risk of not using, benefits, side effects, and alternatives. ? Use of pathology  ? I authorize [employer] to dispose of tissues, specimens or organs when pathology is complete. ? Use of radiology  ? A contrast agent may be required for radiology procedures. My practitioner has advised me of the risks of using, risks of not using, benefits, side effects, and alternatives. ? Observers or use of photography, video/audio recording, or televising of the procedure(s). This is for medical, scientific, or educational purposes. This includes appropriate portions of my body. My identity will not be revealed. ? I consent to release of my social security number and other identifying information to [place] (FDA), and the supplier/, if I receive tissue, a device, or implant. This is to track the tissue, device, or implant for defect, recall, infection, etc.     ? Use of blood and/or blood products, if needed, through my hospital stay. My practitioner has advised me of the risks of using, risks of not using, benefits, side effects, and alternatives. ___ I do NOT want Blood or Blood products given. (Complete separate  refusal form)    Code Status (sabino one):  ___ I do NOT HAVE a DNR order. I am a Full-code.   I will receive CPR, intubation,  chest compressions, medications, and/or other life saving measures if I have a  cardiac or respiratory arrest. ___ I have a Do Not Resuscitate (DNR)order.   (sabino one below)  ___  I rescind my DNR for surgery and immediate post-operative period through Phase 2 recovery. This means, for that time period, I will be a Full-code and receive CPR, intubation, chest compressions, medications, and/or other life saving measures, if I have a cardiac or respiratory arrest.    ___ I WANT to keep my DNR in effect during my procedure(s) and immediate post-operative recovery period through Phase 2 recovery. (Complete separate refusal form)     This form has been fully explained to me. I understand its contents. Patients Signature: ___________________________Date: ________  Time: ________    If patient unable to sign, has engaged the [address] [place] [address], is a minor, or has a court-appointed Guardian:  [facility] Representative Name (Print):  ____________________________________      Relationship ([place] one):    Guardian   Parent    Spouse    HCPOA   Child   Sibling  Next-of-Kin Friend    Patients Representative Signature: _______________________________________              Date: ______________  Time: __________    An  was used.  name/ID: _________________________________      [facility] Witness________________________  Date: ________   Time: _________    Physician/Practitioner _______________________  Date: ________   Time: _________           Revision 2017        [facility] [place]     Dr. Thomas Vaughn     PATIENT NAME:    Josue Hill                               :   1955    PROCEDURE: Atrial Fibrillation  Ablation with transesophageal echocardiogram        DATE OF PROCEDURE: 20    DIAGNOSIS:  I48.91, Z01.810, Z79.0                     X MAG    X   PHOS       X   BMP                       X CBC     X    PT    X   PTT                    X     Chest x-Ray PA & Lateral View      ? PLEASE CALL ABNORMAL RESULTS TO THE REQUESTING PHYSICIAN?     ATTENTION PATIENTS: You do not have to fast for the lab work. You must go to the [address] [place] [address] or [facility]  to have the lab work done. Physician Signature:___________________Date:_____________Time:___________                                [facility]     Dr. Abhilash Alvarado     PROCEDURE TO SCHEDULE:    Atrial Fibrillation Ablation with transesophageal echocardiogram     Patient Name: Eladio Pizarro  : [birth year]   MRN# [id number]    Home Phone Number: [phone]   Weight:    Wt Readings from Last 3 Encounters:   20 253 lb 11.2 oz (115.1 kg)   20 258 lb 9.6 oz (117.3 kg)   20 269 lb 8 oz (122.2 kg)        Insurance: Payor: [facility] [employer] / Plan: [employer] GOLD PLUS HMO / Product Type: *No Product type* /     Date of Procedure: 20 Time: 730 Arrival Time: 06    Diagnosis:  I48.91 (Atrial Fibrillation)  Allergies:    Allergies   Allergen Reactions    Aspirin         1) Call [facility] scheduling ([phone]) or Instant Message  CONFIRMED WITH     PHONE OR   INSTANT MESSAGE  2) PREAUTHORIZATION NUMBER:    Spoke to:      From date:     expiration date:        Sergio Garcia

## 2020-08-31 ENCOUNTER — ANTI-COAG VISIT (OUTPATIENT)
Dept: PHARMACY | Age: 65
End: 2020-08-31
Payer: MEDICARE

## 2020-08-31 VITALS — TEMPERATURE: 98.1 F

## 2020-08-31 LAB
INTERNATIONAL NORMALIZATION RATIO, POC: 2.4
POC INR: 2.4 INDEX
PROTHROMBIN TIME, POC: 28.9 SECONDS (ref 10–14.3)

## 2020-08-31 PROCEDURE — 99212 OFFICE O/P EST SF 10 MIN: CPT

## 2020-08-31 PROCEDURE — 85610 PROTHROMBIN TIME: CPT

## 2020-08-31 PROCEDURE — 36416 COLLJ CAPILLARY BLOOD SPEC: CPT

## 2020-08-31 RX ORDER — WARFARIN SODIUM 7.5 MG/1
7.5 TABLET ORAL EVERY OTHER DAY
Qty: 45 TABLET | Refills: 1 | Status: SHIPPED | OUTPATIENT
Start: 2020-08-31 | End: 2021-02-18 | Stop reason: SDUPTHER

## 2020-08-31 RX ORDER — WARFARIN SODIUM 6 MG/1
6 TABLET ORAL EVERY OTHER DAY
Qty: 45 TABLET | Refills: 1 | Status: SHIPPED | OUTPATIENT
Start: 2020-08-31 | End: 2020-11-04 | Stop reason: CLARIF

## 2020-08-31 NOTE — PROGRESS NOTES
Medication Management Service  [place] [facility]  [phone]    Visit Date: 8/31/2020   Subjective:       Janny Blake is a 76 y.o. male who presents to clinic today for anticoagulation monitoring and adjustment. Patient seen in clinic for warfarin management due to  Indication:   atrial fibrillation. INR goal: of 2.0-3.0. Duration of therapy: indefinite. Patient reports the following:   Adherent with regimen  Missed or extra doses:  None   Bleeding or thromboembolic side effects:  None  Significant medication changes:  None  Significant dietary changes: None  Significant alcohol or tobacco changes: None  Significant recent illness, disease state changes, or hospitalization:  None  Upcoming surgeries or procedures:  None  Falls: None           Assessment and Plan     PT/INR done in office per protocol. INR today is 2.4, therapeutic. Refill requested. Patient will be working during the week near [facility] for next several months. Ablation scheduled for 9/24. Patient will call to schedule pending INR results of pre-procedure testing. Plan: Will continue current regimen of warfarin 6mg alternating every other day with 7.5mg. Recheck INR in 3-4 week(s) pending upcoming procedure. Patient verbalized understanding of dosing directions and information discussed. Dosing schedule given to patient including phone number, appointment date, and time. Progress note sent to referring office. Patient acknowledges working in consult agreement with pharmacist as referred by his/her physician.       Electronically signed by Chava Hernandez [address] [place] [address] on 8/31/20 at 2:06 PM EDT    CLINICAL PHARMACY CONSULT: MED RECONCILIATION/REVIEW ADDENDUM    For Pharmacy Admin Tracking Only    PHSO: Yes  Total # of Interventions Recommended: 0  - Refills Provided #: 2  - Maintenance Safety Lab Monitoring #: 1     Total Interventions Accepted: 0  Time Spent (min): 2021 Mario Puentes PharmD

## 2020-09-08 ENCOUNTER — TELEPHONE (OUTPATIENT)
Dept: CARDIOLOGY CLINIC | Age: 65
End: 2020-09-08

## 2020-09-08 NOTE — TELEPHONE ENCOUNTER
Please call patient, testing or ablation date is different and he is not sure he can do that date.   Please call [phone]

## 2020-09-09 ENCOUNTER — TELEPHONE (OUTPATIENT)
Dept: CARDIOLOGY CLINIC | Age: 65
End: 2020-09-09

## 2020-09-09 NOTE — TELEPHONE ENCOUNTER
The pt cancelled his appointment from 9/11/2020 . For his CT cardiac that was ordered for  .  The pt moved it to the 9/17/2020

## 2020-09-17 ENCOUNTER — HOSPITAL ENCOUNTER (OUTPATIENT)
Dept: CT IMAGING | Age: 65
Discharge: HOME | End: 2020-09-17
Payer: MEDICARE

## 2020-09-17 ENCOUNTER — HOSPITAL ENCOUNTER (OUTPATIENT)
Dept: LAB | Age: 65
Discharge: HOME | End: 2020-09-17
Payer: MEDICARE

## 2020-09-17 LAB
GFR AFRICAN AMERICAN: >60 ML/MIN/1.73M2
GFR NON-AFRICAN AMERICAN: >60 ML/MIN/1.73M2
POC CREATININE: 0.9 MG/DL (ref 0.9–1.3)

## 2020-09-17 PROCEDURE — 6360000004 HC RX CONTRAST MEDICATION: Performed by: PAIN MEDICINE

## 2020-09-17 PROCEDURE — C9803 HOPD COVID-19 SPEC COLLECT: HCPCS

## 2020-09-17 PROCEDURE — 75572 CT HRT W/3D IMAGE: CPT

## 2020-09-17 PROCEDURE — 75574 CT ANGIO HRT W/3D IMAGE: CPT

## 2020-09-17 PROCEDURE — 75574 CT ANGIO HRT W/3D IMAGE: CPT | Performed by: INTERNAL MEDICINE

## 2020-09-17 PROCEDURE — U0002 COVID-19 LAB TEST NON-CDC: HCPCS

## 2020-09-17 RX ADMIN — IOPAMIDOL 130 ML: 755 INJECTION, SOLUTION INTRAVENOUS at 10:28

## 2020-09-18 LAB
SARS-COV-2: NOT DETECTED
SOURCE: NORMAL

## 2020-09-21 ENCOUNTER — TELEPHONE (OUTPATIENT)
Dept: CARDIOLOGY CLINIC | Age: 65
End: 2020-09-21

## 2020-09-21 NOTE — TELEPHONE ENCOUNTER
Chey Guzman called, has questions about after care of ablation sched 24th. They live out of town and wants to know if he can be in car and travel out of town etc.  Please call [phone].

## 2020-09-21 NOTE — TELEPHONE ENCOUNTER
Spoke with patient wife, Phong Resendez, to answer questions about post ablation care. Advised her that patient may ride in the car to travel home (about 3 hours from [facility]) but would prefer if he did not drive. Also advised that patient is allowed to use the stairs in his home as long as he takes it easy for a few days. She voiced understanding.

## 2020-09-23 ENCOUNTER — HOSPITAL ENCOUNTER (OUTPATIENT)
Age: 65
Discharge: HOME | End: 2020-09-23
Payer: MEDICARE

## 2020-09-23 ENCOUNTER — HOSPITAL ENCOUNTER (OUTPATIENT)
Dept: GENERAL RADIOLOGY | Age: 65
Discharge: HOME | End: 2020-09-23
Payer: MEDICARE

## 2020-09-23 ENCOUNTER — ANESTHESIA EVENT (OUTPATIENT)
Dept: CARDIAC CATH/INVASIVE PROCEDURES | Age: 65
End: 2020-09-23
Payer: MEDICARE

## 2020-09-23 ENCOUNTER — TELEPHONE (OUTPATIENT)
Dept: OTHER | Facility: CLINIC | Age: 65
End: 2020-09-23

## 2020-09-23 LAB
ANION GAP SERPL CALCULATED.3IONS-SCNC: 13 MMOL/L (ref 4–16)
APTT: 30.7 SECONDS (ref 25.1–37.1)
BUN BLDV-MCNC: 11 MG/DL (ref 6–23)
CALCIUM SERPL-MCNC: 9.3 MG/DL (ref 8.3–10.6)
CHLORIDE BLD-SCNC: 103 MMOL/L (ref 99–110)
CO2: 25 MMOL/L (ref 21–32)
CREAT SERPL-MCNC: 0.8 MG/DL (ref 0.9–1.3)
GFR AFRICAN AMERICAN: >60 ML/MIN/1.73M2
GFR NON-AFRICAN AMERICAN: >60 ML/MIN/1.73M2
GLUCOSE BLD-MCNC: 91 MG/DL (ref 70–99)
HCT VFR BLD CALC: 50.1 % (ref 42–52)
HEMOGLOBIN: 17.4 GM/DL (ref 13.5–18)
INR BLD: 1.56 INDEX
MAGNESIUM: 2 MG/DL (ref 1.8–2.4)
MCH RBC QN AUTO: 32.5 PG (ref 27–31)
MCHC RBC AUTO-ENTMCNC: 34.7 % (ref 32–36)
MCV RBC AUTO: 93.6 FL (ref 78–100)
PDW BLD-RTO: 13.1 % (ref 11.7–14.9)
PHOSPHORUS: 3.2 MG/DL (ref 2.5–4.9)
PLATELET # BLD: 220 K/CU MM (ref 140–440)
PMV BLD AUTO: 10.9 FL (ref 7.5–11.1)
POTASSIUM SERPL-SCNC: 4.1 MMOL/L (ref 3.5–5.1)
PROTHROMBIN TIME: 19 SECONDS (ref 11.7–14.5)
RBC # BLD: 5.35 M/CU MM (ref 4.6–6.2)
SODIUM BLD-SCNC: 141 MMOL/L (ref 135–145)
WBC # BLD: 6.6 K/CU MM (ref 4–10.5)

## 2020-09-23 PROCEDURE — 36415 COLL VENOUS BLD VENIPUNCTURE: CPT

## 2020-09-23 PROCEDURE — 85027 COMPLETE CBC AUTOMATED: CPT

## 2020-09-23 PROCEDURE — 85610 PROTHROMBIN TIME: CPT

## 2020-09-23 PROCEDURE — 84100 ASSAY OF PHOSPHORUS: CPT

## 2020-09-23 PROCEDURE — 85730 THROMBOPLASTIN TIME PARTIAL: CPT

## 2020-09-23 PROCEDURE — 83735 ASSAY OF MAGNESIUM: CPT

## 2020-09-23 PROCEDURE — 71046 X-RAY EXAM CHEST 2 VIEWS: CPT

## 2020-09-23 PROCEDURE — 80048 BASIC METABOLIC PNL TOTAL CA: CPT

## 2020-09-23 NOTE — TELEPHONE ENCOUNTER
Left message on  voicemail to call office to schedule Consult with Dr. Claude Dural for venous insufficiency. Paul Joe

## 2020-09-23 NOTE — ANESTHESIA PRE PROCEDURE
Department of Anesthesiology  Preprocedure Note       Name:  Aline Aguirre   Age:  76 y.o.  :  1955                                          MRN:  [id number]         Date:  2020      Surgeon: * Surgery not found *    Procedure:     Medications prior to admission:   Prior to Admission medications    Medication Sig Start Date End Date Taking?  Authorizing Provider   warfarin (COUMADIN) 6 MG tablet Take 1 tablet by mouth every other day Alternating with 7.5mg every other day 20   Sonia York MD   warfarin (COUMADIN) 7.5 MG tablet Take 1 tablet by mouth every other day Alternating with 6mg every other day 20   Sonia York MD   sotalol (BETAPACE) 80 MG tablet Take 1 tablet by mouth 2 times daily 20   Romana Castro MD   Omega-3 Fatty Acids (FISH OIL PO) Take by mouth    Historical Provider, MD   warfarin (COUMADIN) 7.5 MG tablet TAKE 1 TABLET BY MOUTH EVERY OTHER DAY 20   Miguel Garcia MD   PARoxetine (PAXIL) 10 MG tablet Take 1 tablet by mouth every morning 3/10/20   Miguel Garcia MD   warfarin (COUMADIN) 6 MG tablet TAKE 1 TABLET BY MOUTH EVERY OTHER DAY 19   Miguel Garcia MD   magnesium oxide (MAG-OX) 400 (241.3 Mg) MG TABS tablet Take 0.5 tablets by mouth daily  Patient taking differently: Take 400 mg by mouth daily  19   INES Foster - CNP       Current medications:    Current Outpatient Medications   Medication Sig Dispense Refill    warfarin (COUMADIN) 6 MG tablet Take 1 tablet by mouth every other day Alternating with 7.5mg every other day 45 tablet 1    warfarin (COUMADIN) 7.5 MG tablet Take 1 tablet by mouth every other day Alternating with 6mg every other day 45 tablet 1    sotalol (BETAPACE) 80 MG tablet Take 1 tablet by mouth 2 times daily 60 tablet 5    Omega-3 Fatty Acids (FISH OIL PO) Take by mouth      warfarin (COUMADIN) 7.5 MG tablet TAKE 1 TABLET BY MOUTH EVERY OTHER DAY 45 tablet 3    PARoxetine (PAXIL) 10 MG tablet Take 1 tablet by mouth every morning 90 tablet 1    warfarin (COUMADIN) 6 MG tablet TAKE 1 TABLET BY MOUTH EVERY OTHER DAY 90 tablet 1    magnesium oxide (MAG-OX) 400 (241.3 Mg) MG TABS tablet Take 0.5 tablets by mouth daily (Patient taking differently: Take 400 mg by mouth daily ) 30 tablet 0     No current facility-administered medications for this encounter. Allergies: Allergies   Allergen Reactions    Aspirin        Problem List:    Patient Active Problem List   Diagnosis Code    Essential hypertension I10    Depression F32.9    Atrial fibrillation ([address] I48.91    Sleep apnea G47.30    S/P compartment syndrome decompression Z98.890    Anticoagulated on Coumadin Z79.01    TIA (transient ischemic attack) G45.9    Leg swelling M79.89    Left arm pain M79.602    Precordial pain R07.2    A-fib ([address] I48.91    Encounter for monitoring sotalol therapy Z51.81, Z79.899    Bowel perforation (HCC) K63.1    Chronic anticoagulation Z79.01       Past Medical History:        Diagnosis Date    Abnormal EKG     Atrial fibrillation ([facility] 1986    Depression     Family history of coronary artery disease     Mother-MI.    H/O cardiac catheterization 11/12/2019    No significant CAD.    H/O echocardiogram 09/11/2019    EF 55-60%, Mild septal wall asymmetrical LVH.  History of cardiac monitoring [id number]    30 Day monitor: Sinus rhythm noted. Minimum hr 46 bpm, Average hr 57 bpm, Max  bpm, atrial fibrillation and flutter episodes noted PAF episodes. In atrial arrhythmia average HR was noted to be 64 bpm. Longest was 20 minutes. [place] is 1%      History of exercise stress test 09/09/2019    treadmill    History of nuclear stress test 09/11/2019    This is a normal study.     Hx of Venous Doppler ultrasound 10/14/2019    No DVT or SVT, Significant reflux in RCFV, RGSV, RSSV, LCFV, LGSV    Left arm pain 09/09/2019    Non-smoker     Obesity     Sleep apnea     TIA (transient ischemic attack) 2008       Past Surgical History:        Procedure Laterality Date    ABDOMEN SURGERY  02/2019    bowel perforation repair    CHOLECYSTECTOMY      FOOT SURGERY Bilateral     LAPAROTOMY N/A 8/6/2020    LAPAROTOMY EXPLORATORY WITH SMALL BOWEL OBSTRUCTION AND PRIMARY ANASTOMOSIS AND EXTENSIVE LYSIS OF ADHESIONS performed by María Richards MD at [facility] .  2017    compartment syndrome right leg    TONSILLECTOMY         Social History:    Social History     Tobacco Use    Smoking status: Never Smoker    Smokeless tobacco: Never Used   Substance Use Topics    Alcohol use: Not Currently                                Counseling given: Not Answered      Vital Signs (Current): There were no vitals filed for this visit. BP Readings from Last 3 Encounters:   08/20/20 114/86   08/13/20 124/80   08/10/20 (!) 142/71       NPO Status:                                                                                 BMI:   Wt Readings from Last 3 Encounters:   08/20/20 253 lb 11.2 oz (115.1 kg)   08/13/20 258 lb 9.6 oz (117.3 kg)   08/09/20 269 lb 8 oz (122.2 kg)     There is no height or weight on file to calculate BMI.    CBC:   Lab Results   Component Value Date    WBC 10.1 08/10/2020    RBC 4.58 08/10/2020    HGB 14.8 08/10/2020    HCT 45.1 08/10/2020    MCV 98.5 08/10/2020    RDW 13.4 08/10/2020     08/10/2020       CMP:   Lab Results   Component Value Date     08/10/2020    K 4.0 08/10/2020     08/10/2020    CO2 29 08/10/2020    BUN 11 08/10/2020    CREATININE 0.9 09/17/2020    CREATININE 0.9 08/10/2020    GFRAA >60 09/17/2020    LABGLOM >60 09/17/2020    GLUCOSE 146 08/10/2020    PROT 6.2 08/07/2020    CALCIUM 8.7 08/10/2020    BILITOT 1.9 08/07/2020    ALKPHOS 88 08/07/2020    AST 20 08/07/2020    ALT 29 08/07/2020       POC Tests: No results for input(s): POCGLU, POCNA, POCK, POCCL, POCBUN, POCHEMO, POCHCT in the last 72 hours.     Coags: Lab Results   Component Value Date    PROTIME 28.9 08/31/2020    INR 2.4 08/31/2020    INR 2.4 08/31/2020    INR 1.24 08/10/2020    APTT 26.7 11/11/2019       HCG (If Applicable): No results found for: PREGTESTUR, PREGSERUM, HCG, HCGQUANT     ABGs: No results found for: PHART, PO2ART, YUH1NHG, FEQ8UEF, BEART, N7EDXECT     Type & Screen (If Applicable):  No results found for: LABABO, LABRH    Drug/Infectious Status (If Applicable):  No results found for: HIV, HEPCAB    COVID-19 Screening (If Applicable):   Lab Results   Component Value Date    COVID19 NOT DETECTED 09/17/2020         Anesthesia Evaluation  Patient summary reviewed  Airway: Mallampati: III  TM distance: >3 FB   Neck ROM: full  Mouth opening: > = 3 FB Dental:          Pulmonary: breath sounds clear to auscultation  (+) sleep apnea:                             Cardiovascular:    (+) hypertension:, dysrhythmias: atrial fibrillation,         Rhythm: irregular  Rate: normal                    Neuro/Psych:   (+) TIA, psychiatric history:depression/anxiety             GI/Hepatic/Renal:   (+) morbid obesity         ROS comment: Bowel perforation. Endo/Other:                     Abdominal:   (+) obese,         Vascular:                                    Anesthesia Plan      general     ASA 4       Induction: intravenous. arterial line  MIPS: Postoperative opioids intended and Prophylactic antiemetics administered. Anesthetic plan and risks discussed with patient. Use of blood products discussed with patient whom consented to blood products. Plan discussed with CRNA.     Attending anesthesiologist reviewed and agrees with Pre Eval content          INES Gillespie - CRNA   9/23/2020

## 2020-09-24 ENCOUNTER — HOSPITAL ENCOUNTER (OUTPATIENT)
Dept: CARDIAC CATH/INVASIVE PROCEDURES | Age: 65
Discharge: HOME | End: 2020-09-24
Attending: INTERNAL MEDICINE | Admitting: INTERNAL MEDICINE
Payer: MEDICARE

## 2020-09-24 ENCOUNTER — ANESTHESIA (OUTPATIENT)
Dept: CARDIAC CATH/INVASIVE PROCEDURES | Age: 65
End: 2020-09-24
Payer: MEDICARE

## 2020-09-24 VITALS
TEMPERATURE: 96.9 F | SYSTOLIC BLOOD PRESSURE: 135 MMHG | DIASTOLIC BLOOD PRESSURE: 91 MMHG | RESPIRATION RATE: 3 BRPM | OXYGEN SATURATION: 96 %

## 2020-09-24 VITALS
HEART RATE: 75 BPM | HEIGHT: 70 IN | OXYGEN SATURATION: 93 % | RESPIRATION RATE: 20 BRPM | SYSTOLIC BLOOD PRESSURE: 137 MMHG | DIASTOLIC BLOOD PRESSURE: 89 MMHG | BODY MASS INDEX: 35.79 KG/M2 | TEMPERATURE: 97.4 F | WEIGHT: 250 LBS

## 2020-09-24 LAB
ABO/RH: NORMAL
ACTIVATED CLOTTING TIME, LOW RANGE: 173 SEC
ANTIBODY SCREEN: NEGATIVE
APTT: 28.8 SECONDS (ref 25.1–37.1)
BASE EXCESS MIXED: 1.5 (ref 0–1.2)
BASE EXCESS: ABNORMAL (ref 0–3.3)
CO2: 29 MMOL/L (ref 21–32)
GLUCOSE BLD-MCNC: 109 MG/DL (ref 70–99)
HCO3 ARTERIAL: 27.8 MMOL/L (ref 18–23)
HCT VFR BLD CALC: 51 % (ref 42–52)
HEMOGLOBIN: 17.2 GM/DL (ref 13.5–18)
INR BLD: 1.43 INDEX
O2 SATURATION: 98 % (ref 96–97)
PCO2 ARTERIAL: 48.2 MMHG (ref 32–45)
PH BLOOD: 7.37 (ref 7.34–7.45)
PO2 ARTERIAL: 108.6 MMHG (ref 75–100)
POC CALCIUM: 1.24 MMOL/L (ref 1.12–1.32)
POC CHLORIDE: 106 MMOL/L (ref 98–109)
POC CREATININE: 0.9 MG/DL (ref 0.9–1.3)
POTASSIUM SERPL-SCNC: 4 MMOL/L (ref 3.5–4.5)
PROTHROMBIN TIME: 17.4 SECONDS (ref 11.7–14.5)
SODIUM BLD-SCNC: 142 MMOL/L (ref 138–146)
SOURCE, BLOOD GAS: ABNORMAL

## 2020-09-24 PROCEDURE — 85610 PROTHROMBIN TIME: CPT

## 2020-09-24 PROCEDURE — 7100000000 HC PACU RECOVERY - FIRST 15 MIN

## 2020-09-24 PROCEDURE — 6370000000 HC RX 637 (ALT 250 FOR IP)

## 2020-09-24 PROCEDURE — C1769 GUIDE WIRE: HCPCS

## 2020-09-24 PROCEDURE — 93613 INTRACARDIAC EPHYS 3D MAPG: CPT | Performed by: INTERNAL MEDICINE

## 2020-09-24 PROCEDURE — 93312 ECHO TRANSESOPHAGEAL: CPT | Performed by: INTERNAL MEDICINE

## 2020-09-24 PROCEDURE — 6360000002 HC RX W HCPCS: Performed by: NURSE ANESTHETIST, CERTIFIED REGISTERED

## 2020-09-24 PROCEDURE — 93621 COMP EP EVL L PAC&REC C SINS: CPT | Performed by: INTERNAL MEDICINE

## 2020-09-24 PROCEDURE — 93653 COMPRE EP EVAL TX SVT: CPT

## 2020-09-24 PROCEDURE — 2709999900 HC NON-CHARGEABLE SUPPLY

## 2020-09-24 PROCEDURE — 2580000003 HC RX 258: Performed by: NURSE ANESTHETIST, CERTIFIED REGISTERED

## 2020-09-24 PROCEDURE — 86850 RBC ANTIBODY SCREEN: CPT

## 2020-09-24 PROCEDURE — 86901 BLOOD TYPING SEROLOGIC RH(D): CPT

## 2020-09-24 PROCEDURE — 6360000002 HC RX W HCPCS

## 2020-09-24 PROCEDURE — 7100000001 HC PACU RECOVERY - ADDTL 15 MIN

## 2020-09-24 PROCEDURE — 86900 BLOOD TYPING SEROLOGIC ABO: CPT

## 2020-09-24 PROCEDURE — 3700000000 HC ANESTHESIA ATTENDED CARE

## 2020-09-24 PROCEDURE — C1730 CATH, EP, 19 OR FEW ELECT: HCPCS

## 2020-09-24 PROCEDURE — C1894 INTRO/SHEATH, NON-LASER: HCPCS

## 2020-09-24 PROCEDURE — 93613 INTRACARDIAC EPHYS 3D MAPG: CPT

## 2020-09-24 PROCEDURE — 93325 DOPPLER ECHO COLOR FLOW MAPG: CPT | Performed by: INTERNAL MEDICINE

## 2020-09-24 PROCEDURE — 93621 COMP EP EVL L PAC&REC C SINS: CPT

## 2020-09-24 PROCEDURE — C1732 CATH, EP, DIAG/ABL, 3D/VECT: HCPCS

## 2020-09-24 PROCEDURE — 2500000003 HC RX 250 WO HCPCS: Performed by: NURSE ANESTHETIST, CERTIFIED REGISTERED

## 2020-09-24 PROCEDURE — 93653 COMPRE EP EVAL TX SVT: CPT | Performed by: INTERNAL MEDICINE

## 2020-09-24 PROCEDURE — 2580000003 HC RX 258

## 2020-09-24 PROCEDURE — 3700000001 HC ADD 15 MINUTES (ANESTHESIA)

## 2020-09-24 PROCEDURE — 85730 THROMBOPLASTIN TIME PARTIAL: CPT

## 2020-09-24 PROCEDURE — 85347 COAGULATION TIME ACTIVATED: CPT

## 2020-09-24 PROCEDURE — 93312 ECHO TRANSESOPHAGEAL: CPT

## 2020-09-24 PROCEDURE — C1733 CATH, EP, OTHR THAN COOL-TIP: HCPCS

## 2020-09-24 PROCEDURE — 2500000003 HC RX 250 WO HCPCS

## 2020-09-24 PROCEDURE — C1753 CATH, INTRAVAS ULTRASOUND: HCPCS

## 2020-09-24 RX ORDER — ONDANSETRON 2 MG/ML
4 INJECTION INTRAMUSCULAR; INTRAVENOUS
Status: DISCONTINUED | OUTPATIENT
Start: 2020-09-24 | End: 2020-09-24 | Stop reason: HOSPADM

## 2020-09-24 RX ORDER — LABETALOL HYDROCHLORIDE 5 MG/ML
5 INJECTION, SOLUTION INTRAVENOUS EVERY 10 MIN PRN
Status: DISCONTINUED | OUTPATIENT
Start: 2020-09-24 | End: 2020-09-24 | Stop reason: HOSPADM

## 2020-09-24 RX ORDER — ROCURONIUM BROMIDE 10 MG/ML
INJECTION, SOLUTION INTRAVENOUS PRN
Status: DISCONTINUED | OUTPATIENT
Start: 2020-09-24 | End: 2020-09-24 | Stop reason: SDUPTHER

## 2020-09-24 RX ORDER — DEXAMETHASONE SODIUM PHOSPHATE 4 MG/ML
INJECTION, SOLUTION INTRA-ARTICULAR; INTRALESIONAL; INTRAMUSCULAR; INTRAVENOUS; SOFT TISSUE PRN
Status: DISCONTINUED | OUTPATIENT
Start: 2020-09-24 | End: 2020-09-24 | Stop reason: SDUPTHER

## 2020-09-24 RX ORDER — LABETALOL HYDROCHLORIDE 5 MG/ML
INJECTION, SOLUTION INTRAVENOUS PRN
Status: DISCONTINUED | OUTPATIENT
Start: 2020-09-24 | End: 2020-09-24 | Stop reason: SDUPTHER

## 2020-09-24 RX ORDER — FENTANYL CITRATE 50 UG/ML
50 INJECTION, SOLUTION INTRAMUSCULAR; INTRAVENOUS EVERY 5 MIN PRN
Status: DISCONTINUED | OUTPATIENT
Start: 2020-09-24 | End: 2020-09-24 | Stop reason: HOSPADM

## 2020-09-24 RX ORDER — ESMOLOL HYDROCHLORIDE 10 MG/ML
INJECTION INTRAVENOUS PRN
Status: DISCONTINUED | OUTPATIENT
Start: 2020-09-24 | End: 2020-09-24 | Stop reason: SDUPTHER

## 2020-09-24 RX ORDER — FENTANYL CITRATE 50 UG/ML
25 INJECTION, SOLUTION INTRAMUSCULAR; INTRAVENOUS EVERY 5 MIN PRN
Status: DISCONTINUED | OUTPATIENT
Start: 2020-09-24 | End: 2020-09-24 | Stop reason: HOSPADM

## 2020-09-24 RX ORDER — HYDRALAZINE HYDROCHLORIDE 20 MG/ML
5 INJECTION INTRAMUSCULAR; INTRAVENOUS EVERY 10 MIN PRN
Status: DISCONTINUED | OUTPATIENT
Start: 2020-09-24 | End: 2020-09-24 | Stop reason: HOSPADM

## 2020-09-24 RX ORDER — SODIUM CHLORIDE 9 MG/ML
INJECTION, SOLUTION INTRAVENOUS CONTINUOUS PRN
Status: DISCONTINUED | OUTPATIENT
Start: 2020-09-24 | End: 2020-09-24 | Stop reason: SDUPTHER

## 2020-09-24 RX ORDER — LIDOCAINE HYDROCHLORIDE 20 MG/ML
INJECTION, SOLUTION INFILTRATION; PERINEURAL PRN
Status: DISCONTINUED | OUTPATIENT
Start: 2020-09-24 | End: 2020-09-24 | Stop reason: SDUPTHER

## 2020-09-24 RX ORDER — ETOMIDATE 2 MG/ML
INJECTION INTRAVENOUS PRN
Status: DISCONTINUED | OUTPATIENT
Start: 2020-09-24 | End: 2020-09-24 | Stop reason: SDUPTHER

## 2020-09-24 RX ORDER — ONDANSETRON 2 MG/ML
INJECTION INTRAMUSCULAR; INTRAVENOUS PRN
Status: DISCONTINUED | OUTPATIENT
Start: 2020-09-24 | End: 2020-09-24 | Stop reason: SDUPTHER

## 2020-09-24 RX ADMIN — ESMOLOL HYDROCHLORIDE 50 MG: 10 INJECTION, SOLUTION INTRAVENOUS at 07:49

## 2020-09-24 RX ADMIN — LABETALOL HYDROCHLORIDE 10 MG: 5 INJECTION, SOLUTION INTRAVENOUS at 09:41

## 2020-09-24 RX ADMIN — LIDOCAINE HYDROCHLORIDE 100 MG: 20 INJECTION, SOLUTION INFILTRATION; PERINEURAL at 07:49

## 2020-09-24 RX ADMIN — DEXAMETHASONE SODIUM PHOSPHATE 4 MG: 4 INJECTION, SOLUTION INTRAMUSCULAR; INTRAVENOUS at 09:14

## 2020-09-24 RX ADMIN — ROCURONIUM BROMIDE 60 MG: 10 INJECTION INTRAVENOUS at 07:49

## 2020-09-24 RX ADMIN — SODIUM CHLORIDE: 900 INJECTION INTRAVENOUS at 07:42

## 2020-09-24 RX ADMIN — SUGAMMADEX 300 MG: 100 INJECTION, SOLUTION INTRAVENOUS at 09:42

## 2020-09-24 RX ADMIN — ETOMIDATE 20 MG: 2 INJECTION, SOLUTION INTRAVENOUS at 07:49

## 2020-09-24 RX ADMIN — ROCURONIUM BROMIDE 25 MG: 10 INJECTION INTRAVENOUS at 09:31

## 2020-09-24 RX ADMIN — ONDANSETRON 4 MG: 2 INJECTION INTRAMUSCULAR; INTRAVENOUS at 09:39

## 2020-09-24 ASSESSMENT — PULMONARY FUNCTION TESTS
PIF_VALUE: 18
PIF_VALUE: 19
PIF_VALUE: 19
PIF_VALUE: 3
PIF_VALUE: 21
PIF_VALUE: 19
PIF_VALUE: 21
PIF_VALUE: 3
PIF_VALUE: 21
PIF_VALUE: 0
PIF_VALUE: 18
PIF_VALUE: 21
PIF_VALUE: 2
PIF_VALUE: 18
PIF_VALUE: 21
PIF_VALUE: 0
PIF_VALUE: 22
PIF_VALUE: 21
PIF_VALUE: 21
PIF_VALUE: 20
PIF_VALUE: 21
PIF_VALUE: 1
PIF_VALUE: 19
PIF_VALUE: 18
PIF_VALUE: 21
PIF_VALUE: 20
PIF_VALUE: 19
PIF_VALUE: 21
PIF_VALUE: 3
PIF_VALUE: 4
PIF_VALUE: 21
PIF_VALUE: 22
PIF_VALUE: 19
PIF_VALUE: 19
PIF_VALUE: 21
PIF_VALUE: 18
PIF_VALUE: 19
PIF_VALUE: 2
PIF_VALUE: 0
PIF_VALUE: 21
PIF_VALUE: 18
PIF_VALUE: 21
PIF_VALUE: 21
PIF_VALUE: 0
PIF_VALUE: 21
PIF_VALUE: 2
PIF_VALUE: 18
PIF_VALUE: 0
PIF_VALUE: 21
PIF_VALUE: 21
PIF_VALUE: 18
PIF_VALUE: 19
PIF_VALUE: 1
PIF_VALUE: 21
PIF_VALUE: 22
PIF_VALUE: 21
PIF_VALUE: 19
PIF_VALUE: 18
PIF_VALUE: 21
PIF_VALUE: 19
PIF_VALUE: 5
PIF_VALUE: 20
PIF_VALUE: 21
PIF_VALUE: 19
PIF_VALUE: 0
PIF_VALUE: 18
PIF_VALUE: 0
PIF_VALUE: 27
PIF_VALUE: 19
PIF_VALUE: 21
PIF_VALUE: 20
PIF_VALUE: 19
PIF_VALUE: 21
PIF_VALUE: 21
PIF_VALUE: 0
PIF_VALUE: 21
PIF_VALUE: 20
PIF_VALUE: 1
PIF_VALUE: 18
PIF_VALUE: 21
PIF_VALUE: 21
PIF_VALUE: 1
PIF_VALUE: 21
PIF_VALUE: 1
PIF_VALUE: 21
PIF_VALUE: 18
PIF_VALUE: 21
PIF_VALUE: 22
PIF_VALUE: 18
PIF_VALUE: 19
PIF_VALUE: 21
PIF_VALUE: 18
PIF_VALUE: 19
PIF_VALUE: 21
PIF_VALUE: 18
PIF_VALUE: 20
PIF_VALUE: 19
PIF_VALUE: 0
PIF_VALUE: 21
PIF_VALUE: 21
PIF_VALUE: 1
PIF_VALUE: 21
PIF_VALUE: 1
PIF_VALUE: 19
PIF_VALUE: 21
PIF_VALUE: 19
PIF_VALUE: 0
PIF_VALUE: 18
PIF_VALUE: 19
PIF_VALUE: 28
PIF_VALUE: 21
PIF_VALUE: 28
PIF_VALUE: 18
PIF_VALUE: 1
PIF_VALUE: 20
PIF_VALUE: 0
PIF_VALUE: 18
PIF_VALUE: 21
PIF_VALUE: 18
PIF_VALUE: 19
PIF_VALUE: 21
PIF_VALUE: 22
PIF_VALUE: 21
PIF_VALUE: 19

## 2020-09-24 ASSESSMENT — ENCOUNTER SYMPTOMS
NAUSEA: 0
DIARRHEA: 0
BACK PAIN: 0
COUGH: 0
EYE PAIN: 0
SHORTNESS OF BREATH: 0
VOMITING: 0
PHOTOPHOBIA: 0
ABDOMINAL PAIN: 0
COLOR CHANGE: 0
CHEST TIGHTNESS: 0
CONSTIPATION: 0
BLOOD IN STOOL: 0
WHEEZING: 0

## 2020-09-24 ASSESSMENT — PAIN SCALES - GENERAL
PAINLEVEL_OUTOF10: 0
PAINLEVEL_OUTOF10: 0

## 2020-09-24 NOTE — H&P
Electrophysiology h&P Note        Reason for consultation:  Atrial fibrillation    Chief complaint :  Palpitations    Referring physician:       Primary care physician: Nico Stuart MD      History of Present Illness: Today visit (09/24/20)    Patient here for atrial fibrillation ablation. No change in H&P noted from previous clinic visit. Previous visit (8/21/2020)      Chief Complaint   Patient presents with    Atrial Fibrillation     Pt is here for afib. Pt states while he was in surgery two week ago for surgery on a bowel he went into afib the next day. Lasted a day. Pt denies chest pain, shortness of breath, dizziness, and edema. Patient feels tired and weak and his endurance has decreased as mentioned in previous visits. He had event monitor and want to discuss results. Previous visit: (6/19/2020)      Chief Complaint   Patient presents with    Atrial Fibrillation     Patient denies chest pain, palpitations, dizziness, swelling, shortness of breath. Patient c/o continous feeling of fatigue. Patient states his blood pressure has been running low. Patient denies alcohol, caffiene. Previous visit:    Chief Complaint   Patient presents with    Atrial Fibrillation     Consult for AFIB. He denies any chest pain,SOB,dizziness, some swelling to legs due to past injury. Sometimes feels palpitations. Pastmedical history:   Past Medical History:   Diagnosis Date    Abnormal EKG     Atrial fibrillation (HCC) 1986    Depression     Family history of coronary artery disease     Mother-MI.    H/O cardiac catheterization 11/12/2019    No significant CAD.    H/O echocardiogram 09/11/2019    EF 55-60%, Mild septal wall asymmetrical LVH.  History of cardiac monitoring [id number]    30 Day monitor: Sinus rhythm noted.  Minimum hr 46 bpm, Average hr 57 bpm, Max  bpm, atrial fibrillation and flutter episodes noted PAF episodes. In atrial arrhythmia average HR was noted to be 64 bpm. Longest was 20 minutes. [place] is 1%      History of exercise stress test 09/09/2019    treadmill    History of nuclear stress test 09/11/2019    This is a normal study.  Hx of Venous Doppler ultrasound 10/14/2019    No DVT or SVT, Significant reflux in RCFV, RGSV, RSSV, LCFV, LGSV    Left arm pain 09/09/2019    Non-smoker     Obesity     Sleep apnea     TIA (transient ischemic attack) 2008       Surgical history :   Past Surgical History:   Procedure Laterality Date    ABDOMEN SURGERY  02/2019    bowel perforation repair    ABDOMEN SURGERY  08/2020    blwel perforation repair    CHOLECYSTECTOMY      FOOT SURGERY Bilateral     LAPAROTOMY N/A 8/6/2020    LAPAROTOMY EXPLORATORY WITH SMALL BOWEL OBSTRUCTION AND PRIMARY ANASTOMOSIS AND EXTENSIVE LYSIS OF ADHESIONS performed by Max Baker MD at [address] [place] [address]  2017    compartment syndrome right leg    TONSILLECTOMY         Family history:   Family History   Problem Relation Age of Onset    Heart Attack Mother     Cancer Mother         ovarian    Other Mother         dementia    Other Father         murdered    Cancer Father         prostate cancer       Social history :  reports that he has never smoked. He has never used smokeless tobacco. He reports previous alcohol use. He reports that he does not use drugs. Allergies   Allergen Reactions    Aspirin        No current facility-administered medications on file prior to encounter.       Current Outpatient Medications on File Prior to Encounter   Medication Sig Dispense Refill    sotalol (BETAPACE) 80 MG tablet Take 1 tablet by mouth 2 times daily 60 tablet 5    Omega-3 Fatty Acids (FISH OIL PO) Take by mouth      warfarin (COUMADIN) 7.5 MG tablet TAKE 1 TABLET BY MOUTH EVERY OTHER DAY 45 tablet 3    PARoxetine (PAXIL) 10 MG tablet Take 1 tablet by mouth every morning 90 tablet 1    magnesium oxide (MAG-OX) 400 Eyes:      Pupils: Pupils are equal, round, and reactive to light. Neck:      Musculoskeletal: Normal range of motion. Vascular: No JVD. Cardiovascular:      Rate and Rhythm: Regular rhythm. Bradycardia present. Heart sounds: No murmur. No friction rub. Pulmonary:      Effort: Pulmonary effort is normal. No respiratory distress. Breath sounds: Normal breath sounds. No wheezing or rales. Abdominal:      General: Bowel sounds are normal. There is no distension. Palpations: Abdomen is soft. Tenderness: There is no abdominal tenderness. Musculoskeletal:         General: No tenderness. Skin:     General: Skin is warm and dry. Neurological:      Mental Status: He is alert and oriented to person, place, and time. Cranial Nerves: No cranial nerve deficit. CBC:   Lab Results   Component Value Date    WBC 6.6 09/23/2020    HGB 17.4 09/23/2020    HCT 50.1 09/23/2020     09/23/2020     Lipids:No results found for: CHOL, TRIG, HDL, LDLCALC, LDLDIRECT  PT/INR:   Lab Results   Component Value Date    INR 1.43 09/24/2020        BMP:    Lab Results   Component Value Date     09/23/2020    K 4.1 09/23/2020     09/23/2020    CO2 25 09/23/2020    BUN 11 09/23/2020     CMP:   Lab Results   Component Value Date    AST 20 08/07/2020    PROT 6.2 (L) 08/07/2020    BILITOT 1.9 (H) 08/07/2020    ALKPHOS 88 08/07/2020     TSH:  No results found for: TSH    EKGINTERPRETATION - EKG Interpretation:  SINUS BRADYCARDIA      IMPRESSION / RECOMMENDATIONS:     1. PAF  2. ? eXERCISE INDUCED paf  3. LVH   4. TIA    Patient having atrial fibrillation episodes on event monitor. Results can be mostly flutter because of the variable interval it could be called his A. fib but previously had PAF to so probably PAF but patient appearance of event monitor looks like flutter. We could get an EP study on the patient at the same time and assess for arrhythmias.     Patient did not report symptoms at that timeof atrial flutter looking episodes but he mostly reports symptoms during exercise. Palpitations during exercise frequently and is happening more frequently so EP study will help if there is any other tachycardia. There are any other tachycardia then we will try to ablate that first    Discussed with the patient about atrial fibrillation ablation  Discussed risks with procedure and patient wants to proceed with procedure  Continue sotalol and Coumadin for now    Thanks again for allowing me to participate in care of this patient. Please call me if you have any questions. With best regards. Karey Sher MD, 9/24/2020 7:43 AM     Please note this report has been partially produced using speech recognition software and may contain errors related to that system including errors in grammar, punctuation, and spelling, as well as words and phrases that may be inappropriate. If there are any questions or concerns please feel free to contact the dictating provider for clarification.

## 2020-09-24 NOTE — PROGRESS NOTES
1003: Arrived to PACU from cath lab. Monitors applied, alarms on. Report obtained from [address] [employer]. 1030: Wakeful and talkative, no c/o voiced. 1048: Transported to cath lab hold, wife at bedside. Report given to and care assumed per [facility].

## 2020-09-24 NOTE — FLOWSHEET NOTE
KEREN Rodriguez at bedside and removed bilateral groin figure eight sutures from venous puncture sites and then re dressed sites with 4x4 and tegaderms

## 2020-09-24 NOTE — FLOWSHEET NOTE
Pt to pt  in wheelchair per RN for d/c home in private vehicle with spouse.  Bilateral groins free of bleeding/hematoma at time of d/c

## 2020-09-24 NOTE — PLAN OF CARE
All discharge instructions explained to the patient at this time. Patient and wife deny any additional questions. IV removed per discharge protocol and patient walked throughout the unit. Bilateral groins checked and no bleeding or hematoma's noted.  Samy Moctezuma RN 9/24/2020

## 2020-09-24 NOTE — OP NOTE
Lidocaine was administered to the left and right groin. Using a modified Seldinger technique, venous access was obtained and sheaths were placed as detailed below. Catheters were then placed under fluoroscopic guidance as detailed below. Sheath and Catheter Placement:      Location Sheath Catheter site   Left femoral vein 7F [employer] 6F Decapolar catheter Coronary sinus   Left femoral vein 11F - if ICE was noted [employer] His   Right femoral vein 6F [employer] Quadripolar Matt Right ventricle   Right femoral vein 8F Short and 8.5 F SRO   D-F curve smart touch [employer] Ablation catheter     Slow pathway modification             Baseline parameters (ms):    IN interval: 198ms   QRS duration: 95 ms     QT interval: 434 ms    Baseline cycle length: 922 ms    AH interval: 89 ms   HV interval: 49 ms         Just by placing the catheters patient was going into arrhythmia at rate of 450ms so we decided to analyse the arrhythmia. Sinus node function:  Sinus node function was within normal limits. Atrioventricular mary ellen function:  Incremental pacing was performed from the Proximal CS and right ventricular apex and the antegrade and retrograde atrioventricular (AV) Wenckebach cycle length was determined. Antegrade AV Wenckebach 400 ms  Retrograde AV Wenckebach 450 ms    Concentric activation on coronary sinus catheter in sinus rhythm and also present on retrograde RV pacing. LA pacing and recording was performed using CS catheter    Parahisian pacing was performed and no obvious pathway noted. Single atrial extrastimuli were delivered following drivetrains of 652ZZ from the high right atrium and the AV mary ellen effective refractory period (ERP) was determined. Evidence of dual AV mary ellen pathways was seen.     Drive train (ms) 371    AVNERP  Fast pathway ERP  Slow pathway ERP   330  310    AERP 250      Patient was easily going into arrhythmia and needed V pacing to terminate it    Ventricular function    Single ventricular extrastimuli were delivered following drivetrains of 003ZB  from the right ventricular apex and the ventricular ERP was determined. VAERP: 600/360   Ventricular  ms / 260 ms;       Aggressive V pacing was not performed as patient was having low BP with ventricular pacing. Arrhythmia Induction:  A supraventricular tachyarrhythmia was induced with programmed stimulation. The induced tachyarrhythmia had a cycle length (TCL) of 450ms. The VA time during the tachycardia was 80 ms. Ventricular entrainment was performed and demonstrated a ASHISH response with a long return cycle length (> 115ms + the TCL). In addition, the difference in the stim-atrial EGM time during ventricular entrainment and VA time during SVT was > 85ms. Variation in the tachycardia cycle length demonstrated dependence on HH variation confirming AV mary ellen participation. 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 3D Mapping:    Artem[employer] Kaw[employer] CARTO 3 system was used for [employer]. Was also helpful in confirming the electro-anatomical/activation mapping. Important anatomical landmarks were marked on the map to avoid complications. Also Ablation spots were marked to able to be precise and focus on the ablation area and avoid collateral damage. 3D mapping was helpful in decreasing the fluoroscopy time. Ablation  Following the diagnosis of typical atrioventricular mary ellen reentrant tachycardia, the smart touch D-F curve ablation catheter was advanced into position along the septal aspect of the tricuspid valve under fluoroscopic guidance. Electrophysiologic mapping was performed to localize an area on the anterior lip of the coronary sinus ostium where an atrial-ventricular ratio of approximately 1:3 could be obtained.   Following identification of this area, a lesion was delivered (50 W, 60 C, 60 seconds) with demonstration of

## 2020-09-24 NOTE — PROCEDURES
Lyndsay Morales   76 y.o., male  1955      9/24/2020    Attending: Radha Sargent. MD    Sedation : Anesthesia                        Indication:       Pre-atrial fibrillation ablation                                                                                                                                                   After obtaining the informed cosent. Pt brought to EP lab. Sedation per anesthesia . After proper sedation NATE performed. US Doppler was used to assess abnormalities where appropriate. Post procedure pt is stable. Findings:  LV=  Normal LVEF  LA=  normal  JOSHUA= NO CLOTS  RV= normal  RA=  normal  IAS= Normal  Bubble study=not donefor PFO or ASD  AV= TRICUSPID, Mild regurgitation and no stenosis  MV= trivial regurgitation, no stenosis  TV=mild regurgitation  PV= no significant regurgitation,   Aorta=not well visualized. PUL RADHA FLOW=Appears normal systolic function.      Impression:  No JOSHUA clot    Plan:  Proceed with ablation

## 2020-09-25 ENCOUNTER — TELEPHONE (OUTPATIENT)
Dept: CARDIOLOGY CLINIC | Age: 65
End: 2020-09-25

## 2020-09-25 NOTE — TELEPHONE ENCOUNTER
Spoke with patient and post procedure follow up scheduled 10/6/2020 as requested by patient. Patient states he is needing this date as he will be in town this day only.

## 2020-09-28 ENCOUNTER — TELEPHONE (OUTPATIENT)
Dept: CARDIOLOGY CLINIC | Age: 65
End: 2020-09-28

## 2020-09-28 NOTE — TELEPHONE ENCOUNTER
Wife called patient just had a procedure with   Dr Clarice Callaway Saturday his arm started to swell  By Sunday he could not even bend

## 2020-09-28 NOTE — TELEPHONE ENCOUNTER
Spoke with patient. He states \"My wife worries too much, I am fine. \" He states IV was blown in right arm on Thursday. He has has swelling to the IV site and his hand/fingers are puffy. He denies any hard knot, numbness or tingling. He is able to palpate radial pulse. Offered appointment with NP this afternoon but patient lives in [place]. Advised to ice and elevate arm and to go to the ER if he feels there is decreased blood flow to his hand. Patient voiced understanding.

## 2020-10-05 ENCOUNTER — ANTI-COAG VISIT (OUTPATIENT)
Dept: PHARMACY | Age: 65
End: 2020-10-05
Payer: MEDICARE

## 2020-10-05 ENCOUNTER — TELEPHONE (OUTPATIENT)
Dept: PHARMACY | Age: 65
End: 2020-10-05

## 2020-10-05 VITALS — TEMPERATURE: 97.8 F

## 2020-10-05 LAB
INTERNATIONAL NORMALIZATION RATIO, POC: 1.7
POC INR: 1.7 INDEX
PROTHROMBIN TIME, POC: 20 SECONDS (ref 10–14.3)

## 2020-10-05 PROCEDURE — 85610 PROTHROMBIN TIME: CPT

## 2020-10-05 PROCEDURE — 99212 OFFICE O/P EST SF 10 MIN: CPT

## 2020-10-05 PROCEDURE — 36416 COLLJ CAPILLARY BLOOD SPEC: CPT

## 2020-10-05 NOTE — TELEPHONE ENCOUNTER
Scheduled for today.      CLINICAL PHARMACY CONSULT: MED RECONCILIATION/REVIEW ADDENDUM    For Pharmacy Admin Tracking Only    PHSO: Yes  Total # of Interventions Recommended: 0    Total Interventions Accepted: 0  Time Spent (min): 5    Deino Gonzalez, JanessaD

## 2020-10-06 ENCOUNTER — OFFICE VISIT (OUTPATIENT)
Dept: CARDIOLOGY CLINIC | Age: 65
End: 2020-10-06
Payer: MEDICARE

## 2020-10-06 ENCOUNTER — INITIAL CONSULT (OUTPATIENT)
Dept: CARDIOLOGY CLINIC | Age: 65
End: 2020-10-06
Payer: MEDICARE

## 2020-10-06 ENCOUNTER — TELEPHONE (OUTPATIENT)
Dept: CARDIOLOGY CLINIC | Age: 65
End: 2020-10-06

## 2020-10-06 VITALS
DIASTOLIC BLOOD PRESSURE: 84 MMHG | BODY MASS INDEX: 37.62 KG/M2 | HEIGHT: 70 IN | WEIGHT: 262.8 LBS | SYSTOLIC BLOOD PRESSURE: 130 MMHG | HEART RATE: 62 BPM

## 2020-10-06 VITALS
SYSTOLIC BLOOD PRESSURE: 124 MMHG | WEIGHT: 262 LBS | DIASTOLIC BLOOD PRESSURE: 78 MMHG | BODY MASS INDEX: 37.51 KG/M2 | HEART RATE: 62 BPM | HEIGHT: 70 IN

## 2020-10-06 PROBLEM — I83.893 VARICOSE VEINS OF BOTH LEGS WITH EDEMA: Status: ACTIVE | Noted: 2020-10-06

## 2020-10-06 PROBLEM — I47.1 AVNRT (AV NODAL RE-ENTRY TACHYCARDIA) (HCC): Status: ACTIVE | Noted: 2020-10-06

## 2020-10-06 PROBLEM — I47.19 AVNRT (AV NODAL RE-ENTRY TACHYCARDIA): Status: ACTIVE | Noted: 2020-10-06

## 2020-10-06 PROCEDURE — 1036F TOBACCO NON-USER: CPT | Performed by: NURSE PRACTITIONER

## 2020-10-06 PROCEDURE — 3017F COLORECTAL CA SCREEN DOC REV: CPT | Performed by: INTERNAL MEDICINE

## 2020-10-06 PROCEDURE — 3017F COLORECTAL CA SCREEN DOC REV: CPT | Performed by: NURSE PRACTITIONER

## 2020-10-06 PROCEDURE — G8484 FLU IMMUNIZE NO ADMIN: HCPCS | Performed by: NURSE PRACTITIONER

## 2020-10-06 PROCEDURE — 4040F PNEUMOC VAC/ADMIN/RCVD: CPT | Performed by: NURSE PRACTITIONER

## 2020-10-06 PROCEDURE — 93000 ELECTROCARDIOGRAM COMPLETE: CPT | Performed by: NURSE PRACTITIONER

## 2020-10-06 PROCEDURE — 1036F TOBACCO NON-USER: CPT | Performed by: INTERNAL MEDICINE

## 2020-10-06 PROCEDURE — G8427 DOCREV CUR MEDS BY ELIG CLIN: HCPCS | Performed by: NURSE PRACTITIONER

## 2020-10-06 PROCEDURE — 4040F PNEUMOC VAC/ADMIN/RCVD: CPT | Performed by: INTERNAL MEDICINE

## 2020-10-06 PROCEDURE — 99214 OFFICE O/P EST MOD 30 MIN: CPT | Performed by: INTERNAL MEDICINE

## 2020-10-06 PROCEDURE — G8417 CALC BMI ABV UP PARAM F/U: HCPCS | Performed by: INTERNAL MEDICINE

## 2020-10-06 PROCEDURE — 1123F ACP DISCUSS/DSCN MKR DOCD: CPT | Performed by: NURSE PRACTITIONER

## 2020-10-06 PROCEDURE — 1123F ACP DISCUSS/DSCN MKR DOCD: CPT | Performed by: INTERNAL MEDICINE

## 2020-10-06 PROCEDURE — G8484 FLU IMMUNIZE NO ADMIN: HCPCS | Performed by: INTERNAL MEDICINE

## 2020-10-06 PROCEDURE — G8427 DOCREV CUR MEDS BY ELIG CLIN: HCPCS | Performed by: INTERNAL MEDICINE

## 2020-10-06 PROCEDURE — 99213 OFFICE O/P EST LOW 20 MIN: CPT | Performed by: NURSE PRACTITIONER

## 2020-10-06 PROCEDURE — G8417 CALC BMI ABV UP PARAM F/U: HCPCS | Performed by: NURSE PRACTITIONER

## 2020-10-06 ASSESSMENT — ENCOUNTER SYMPTOMS
CHEST TIGHTNESS: 0
EYE REDNESS: 0
BACK PAIN: 0
VOMITING: 0
WHEEZING: 0
EYE PAIN: 0
BLOOD IN STOOL: 0
SHORTNESS OF BREATH: 0
SINUS PAIN: 0
ABDOMINAL PAIN: 0
NAUSEA: 0

## 2020-10-06 NOTE — PROGRESS NOTES
Electrophysiology Follow up    Reason for consultation: PAF/      Chief complaint :  Follow up from AVNRT ablation     Referring physician: Dr. Elizabeth Puga      Primary care physician: Hanna Clifford MD     History of Present Illness: 10/6/2020  He denies any  chest pain or SOB. There are no palpitations. He denies any dizziness. The groin sites are free of hematoma or ecchymosis. Previous visit on (09/24/20)     Patient here for atrial fibrillation ablation. No change in H&P noted from previous clinic visit.     Previous visit (8/21/2020)             Chief Complaint   Patient presents with    Atrial Fibrillation       Pt is here for afib. Pt states while he was in surgery two week ago for surgery on a bowel he went into afib the next day. Lasted a day. Pt denies chest pain, shortness of breath, dizziness, and edema. Patient feels tired and weak and his endurance has decreased as mentioned in previous visits. He had event monitor and want to discuss results.                Previous visit: (6/19/2020)             Chief Complaint   Patient presents with    Atrial Fibrillation       Patient denies chest pain, palpitations, dizziness, swelling, shortness of breath. Patient c/o continous feeling of fatigue. Patient states his blood pressure has been running low. Patient denies alcohol, caffiene.                Previous visit:          Chief Complaint   Patient presents with    Atrial Fibrillation       Consult for AFIB. He denies any chest pain,SOB,dizziness, some swelling to legs due to past injury. Sometimes feels palpitations.                Past Medical History:   Diagnosis Date    Abnormal EKG     Atrial fibrillation ([address] 1986    Depression     Family history of coronary artery disease     Mother-MI.    H/O cardiac catheterization 11/12/2019    No significant CAD.    H/O echocardiogram 09/11/2019    EF 55-60%, Mild septal wall asymmetrical LVH.     History of cardiac monitoring [id number]    30 Day monitor: Sinus rhythm noted. Minimum hr 46 bpm, Average hr 57 bpm, Max  bpm, atrial fibrillation and flutter episodes noted PAF episodes. In atrial arrhythmia average HR was noted to be 64 bpm. Longest was 20 minutes. [place] is 1%      History of exercise stress test 09/09/2019    treadmill    History of nuclear stress test 09/11/2019    This is a normal study.  Hx of Venous Doppler ultrasound 10/14/2019    No DVT or SVT, Significant reflux in RCFV, RGSV, RSSV, LCFV, LGSV    Left arm pain 09/09/2019    Non-smoker     Obesity     Sleep apnea     TIA (transient ischemic attack) 2008     Past Surgical History:   Procedure Laterality Date    ABDOMEN SURGERY  02/2019    bowel perforation repair    ABDOMEN SURGERY  08/2020    blwel perforation repair    ABLATION OF DYSRHYTHMIC FOCUS  09/24/2020    AVNRT ablation    CHOLECYSTECTOMY      FOOT SURGERY Bilateral     LAPAROTOMY N/A 8/6/2020    LAPAROTOMY EXPLORATORY WITH SMALL BOWEL OBSTRUCTION AND PRIMARY ANASTOMOSIS AND EXTENSIVE LYSIS OF ADHESIONS performed by Emelda Gottron, MD at [address]  2017    compartment syndrome right leg    TONSILLECTOMY       Family History   Problem Relation Age of Onset    Heart Attack Mother     Cancer Mother         ovarian    Other Mother         dementia    Other Father         murdered    Cancer Father         prostate cancer     Social History     Tobacco Use    Smoking status: Never Smoker    Smokeless tobacco: Never Used   Substance Use Topics    Alcohol use: Not Currently          Review of Systems   Constitutional: Negative for fatigue and fever. HENT: Negative for congestion and sinus pain. Eyes: Negative for pain and redness. Respiratory: Negative for chest tightness, shortness of breath and wheezing. Cardiovascular: Negative for chest pain, palpitations and leg swelling. Gastrointestinal: Negative for abdominal pain, blood in stool, nausea and vomiting.    Endocrine: Negative for cold intolerance and heat intolerance. Genitourinary: Negative for flank pain and hematuria. Musculoskeletal: Negative for arthralgias, back pain and myalgias. Skin: Negative for rash and wound. Neurological: Negative for dizziness, syncope, weakness and light-headedness. Hematological: Negative for adenopathy. Does not bruise/bleed easily. Psychiatric/Behavioral: The patient is not nervous/anxious. /78   Pulse 62   Ht 5' 10\" (1.778 m)   Wt 262 lb (118.8 kg)   BMI 37.59 kg/m²   Wt Readings from Last 3 Encounters:   10/06/20 262 lb (118.8 kg)   09/24/20 250 lb (113.4 kg)   08/20/20 253 lb 11.2 oz (115.1 kg)       Physical Exam  Constitutional:       Appearance: Normal appearance. He is obese. HENT:      Head: Normocephalic and atraumatic. Right Ear: External ear normal.      Left Ear: External ear normal.      Nose: Nose normal.      Mouth/Throat:      Mouth: Mucous membranes are moist.      Pharynx: Oropharynx is clear. Eyes:      Extraocular Movements: Extraocular movements intact. Pupils: Pupils are equal, round, and reactive to light. Neck:      Musculoskeletal: Normal range of motion and neck supple. Cardiovascular:      Rate and Rhythm: Normal rate and regular rhythm. Pulses: Normal pulses. Pulmonary:      Effort: Pulmonary effort is normal.      Breath sounds: Normal breath sounds. Abdominal:      General: There is no distension. Palpations: Abdomen is soft. Tenderness: There is no abdominal tenderness. Musculoskeletal: Normal range of motion. Right lower leg: No edema. Left lower leg: No edema. Skin:     General: Skin is warm. Capillary Refill: Capillary refill takes less than 2 seconds. Neurological:      General: No focal deficit present. Mental Status: He is alert and oriented to person, place, and time.    Psychiatric:         Mood and Affect: Mood normal.         Behavior: Behavior normal. 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 His trigger for atrial fibrillation may have been AVNRT. Can consider coming off  anticoagualiton in the future  Will continue with coumadin at this time  Will have patient to office in 1 month for follow up   continue to avoid caffeine and alcohol.       INES Dowling CNP on 10/6/2020 at 9:55 AM

## 2020-10-06 NOTE — LETTER
CLINICAL STAFF DOCUMENTATION     Nova Augustin  1955  [id number]    Have you had any Chest Pain - No    Have you had any Shortness of Breath - No    Have you had any dizziness - No    Have you had any palpitations - No    Do you have any edema - swelling - No     Check Venous \"LEG PROBLEM Questionnaire\"    Do you have a surgery or procedure scheduled in the near future - No    Ask patient if they want to sign [facility] if they are not already signed up    Check to see if we have an E-MAIL on file for the patient    Check medication list thoroughly!!!  BE SURE TO ASK PATIENT IF THEY NEED MEDICATION REFILLS

## 2020-10-06 NOTE — PROGRESS NOTES
CARDIAC CONSULT NOTE       Reason for consultation: Gita Barriga is a 76 y.o. male who had concerns including Edema. .    Chief Complaint   Patient presents with    Edema       Referring physician:  Hanna Clifford MD    Primary care physician:  Hanna Clifford MD    History of Present Illness:   77 Y/O white male referred for evaluation 7 management of CVI. Patient C/O Leg edema, Prominent leg veins & skin discoloration. Patient has tried wearing compression stockings over time but has not been successful.     has a past medical history of Abnormal EKG, Atrial fibrillation ([address] Depression, Family history of coronary artery disease, H/O cardiac catheterization, H/O echocardiogram, History of cardiac monitoring, History of exercise stress test, History of nuclear stress test, Hx of Venous Doppler ultrasound, Left arm pain, Non-smoker, Obesity, Sleep apnea, and TIA (transient ischemic attack). has a past surgical history that includes Cholecystectomy; Leg Surgery (2017); Tonsillectomy; Foot surgery (Bilateral); laparotomy (N/A, 8/6/2020); Abdomen surgery (02/2019); Abdomen surgery (08/2020); and ablation of dysrhythmic focus (09/24/2020). reports that he has never smoked. He has never used smokeless tobacco. He reports previous alcohol use. He reports that he does not use drugs. As Reviewed family history includes Cancer in his father and mother; Heart Attack in his mother; Other in his father and mother. 1. Review of Systems: As in HPIConstitutional: No Fever or Weight Loss  2. Eyes: No Decreased Vision  3. ENT: No Headaches, Hearing Loss or Vertigo  4. Cardiovascular: No chest pain, dyspnea on exertion, palpitations or loss of consciousness  5. Respiratory: No cough or wheezing    6. Gastrointestinal: No abdominal pain, appetite loss, blood in stools, constipation, diarrhea or heartburn  7.  Genitourinary: No dysuria, trouble voiding, or hematuria  8. Musculoskeletal:  No gait disturbance, weakness or joint complaints  9. Integumentary: No rash or pruritis  10. Neurological: No TIA or stroke symptoms  11. Psychiatric: No anxiety or depression  12. Endocrine: No malaise, fatigue or temperature intolerance  13. Hematologic/Lymphatic: No bleeding problems, blood clots or swollen lymph nodes  14. Allergic/Immunologic: No nasal congestion or hives    Physical Examination:    /84   Pulse 62   Ht 5' 10\" (1.778 m)   Wt 262 lb 12.8 oz (119.2 kg)   BMI 37.71 kg/m²    Wt Readings from Last 3 Encounters:   10/06/20 262 lb 12.8 oz (119.2 kg)   10/06/20 262 lb (118.8 kg)   09/24/20 250 lb (113.4 kg)     Body mass index is 37.71 kg/m². General Appearance:  Non-obese/Well Nourished  1. Skin: It is warm & dry. No rashes noted. 2. Eyes: No conjunctival Pallor seen. No jaundice noted. 3. HEENT: atraumatic / normocephalic. EOMI. Neck is supple there is no elevation of JVD. No thyromegaly  4. Respiratory:  · Resp Assessment: Normal  · Resp Auscultation: Normal breath sounds without dullness  5. Cardiovascular:  · Auscultation: Normal  · Carotid Arteries: Normal  · Abdominal Aorta: Normal   · Pedal Pulses: 2+ and equal   6. Abdomen:  · No masses or tenderness  · Liver/Spleen: No Abnormalities Noted, no organomegaly. 7. Musculoskeletal: No joint deformities. No muscle wasting  8. Extremities:  ·  No Cyanosis or Clubbing. No significant edema   9. Rectal / genital: deferred.   10. Neurological/Psychiatric:  · Oriented to time, place, and person  · Non-anxious    No results found for: CKTOTAL, CKMB, CKMBINDEX, TROPONINT  BNP:  No results found for: BNP, PROBNP  PT/INR:  No results found for: PTINR  No results found for: LABA1C  No results found for: CHOL, TRIG, HDL, LDLCALC, LDLDIRECT, CHOLHDLRATIO  Lab Results   Component Value Date    ALT 29 08/07/2020    ALT 24 08/06/2020    AST 20 08/07/2020    AST 16 08/06/2020     BMP:    Lab Results K12.716     Patient Active Problem List:     Essential hypertension     Depression     Atrial fibrillation (HCC)     Sleep apnea     S/P compartment syndrome decompression     Anticoagulated on Coumadin     TIA (transient ischemic attack)     Leg swelling     Left arm pain     Precordial pain     A-fib (HCC)     Encounter for monitoring sotalol therapy     Bowel perforation (HCC)     Chronic anticoagulation     AVNRT (AV mary ellen re-entry tachycardia) ([facility] [address]      QUALITY MEASURES REVIEWED:  1.CAD:Patient is taking anti platelet agent:No  2. DYSLIPIDEMIA: Patient is on cholesterol lowering medication:No  3. Beta-Blocker therapy for CAD, if prior Myocardial Infarction:Yes  4. Counselled regarding smoking cessation. 5. Atrial fibrillation & anticoagulation therapy Yes  6. Discussed weight management strategies. Recommendations:   Patient has significant Reflux of both GSV & right SSV in addition to B/L Femoral veins. He is symptomatic. Recommend serial ablations. OV a month after the procedures.        Raul Mace MD, 10/6/2020 4:48 PM

## 2020-10-06 NOTE — LETTER
[address] [facility]  [address] [address]  Phone: [phone]  Fax: [phone]    Estephanie Blackwell MD        October 6, 2020     Kirsten Bravo MD  No address on file    Patient: Alex Charles  MR Number: [id number]  YOB: 1955  Date of Visit: 10/6/2020    Dear Dr. Kirsten Bravo:    Thank you for the request for consultation for Nubia Seaman to me for the evaluation of CVI. Below are the relevant portions of my assessment and plan of care. If you have questions, please do not hesitate to call me. I look forward to following Lorena Donald along with you.     Sincerely,        Estephanie Blackwell MD

## 2020-10-06 NOTE — PATIENT INSTRUCTIONS
Patient has significant Reflux of both GSV & right SSV in addition to B/L Femoral veins. He is symptomatic. Recommend serial ablations. OV a month after the procedures.

## 2020-10-08 ENCOUNTER — TELEPHONE (OUTPATIENT)
Dept: CARDIOLOGY CLINIC | Age: 65
End: 2020-10-08

## 2020-10-08 NOTE — TELEPHONE ENCOUNTER
Request for PA submitted to [email or web address] and scanned in. Referral entry done. Documents sent.

## 2020-10-09 ENCOUNTER — TELEPHONE (OUTPATIENT)
Dept: CARDIOLOGY CLINIC | Age: 65
End: 2020-10-09

## 2020-10-27 ENCOUNTER — TELEPHONE (OUTPATIENT)
Dept: CARDIOLOGY CLINIC | Age: 65
End: 2020-10-27

## 2020-10-27 ENCOUNTER — TELEPHONE (OUTPATIENT)
Dept: PHARMACY | Age: 65
End: 2020-10-27

## 2020-10-27 NOTE — TELEPHONE ENCOUNTER
No show to lab. Other appointment was cancelled with [facility]. Left message for patient requesting patient go to lab Friday.      CLINICAL PHARMACY CONSULT: MED RECONCILIATION/REVIEW ADDENDUM    For Pharmacy Admin Tracking Only    PHSO: Yes  Total # of Interventions Recommended: 0  Total Interventions Accepted: 0  Time Spent (min): 5    Alondra Fuller, JanessaD

## 2020-10-27 NOTE — TELEPHONE ENCOUNTER
Patient returned call. He thought he had rescheduled for 11/3. Patient will be in town 11/3.      CLINICAL PHARMACY CONSULT: MED RECONCILIATION/REVIEW ADDENDUM    For Pharmacy Admin Tracking Only    PHSO: Yes  Total # of Interventions Recommended: 0    Total Interventions Accepted: 0  Time Spent (min): 5    Janessa HermanD

## 2020-10-27 NOTE — TELEPHONE ENCOUNTER
[facility]. Procedure scheduled 10/30/20 @ 12:00 with arrival time of 10:50. Instructions given. Patient states he is unable to come for follow up [address] but will be coming to town on Tuesday. Advised that Tuesday is OK but if he should become symptomatic he should to to the ED. Patient agreed.

## 2020-10-30 ENCOUNTER — PROCEDURE VISIT (OUTPATIENT)
Dept: CARDIOLOGY CLINIC | Age: 65
End: 2020-10-30
Payer: MEDICARE

## 2020-10-30 PROCEDURE — 36482 ENDOVEN THER CHEM ADHES 1ST: CPT | Performed by: INTERNAL MEDICINE

## 2020-10-30 NOTE — PROGRESS NOTES
Endovenous Ablation with VenaSeal Operative Report    10/30/2020    Subjective:  Ehsan Hough is a 76 y.o. male    Procedure Performed:    Endovenous Ablation of the Great Saphenous Vein with VenaSeal Closure System of the  Left side. Indication  Duplex ultrasound was used to map out the insufficient saphenous vein, and access was determined and marked on the overlying skin. The depth and diameter of the vein(s) to be treated was documented. The patient was placed supine on the procedure table and the leg was prepped and draped using sterile technique. Ultrasound guidance was again used to localize the access site. 1% lidocaine was injected as a local anesthetic in the subcutaneous tissues at the target location in the GSV in the lower leg. Using ultrasound guidance, access was gained at this location with the 19 gauge thin walled access needle and followed by introduction of a short guidewire, location confirmed with ultrasound. A small, 3 mm incision was made at the access site to allow for introduction and placement of the 7 Fr x7cm introducer/dilator. The dilator and guidewire were removed. The 0.035 guidewire from the [facility] kit was then introduced and positioned at the saphenofemoral junction using ultrasound guidance. The 80 cm 7 Fr introducer sheath/dilator was positioned 5cm from the saphenofemoral junction. The guidewire and dilator were removed, and the remaining sheath was flushed with sterile saline, with the syringe remaining in place prior to the next steps. The cyanoacrylate adhesive was precisely primed into the 5 F delivery catheter and this catheter/syringe combination was attached within the dispenser gun. This \"assembly\" was introduced through the 7 F sheath and positioned 5 cm caudal of the saphenofemoral junction under ultrasound guidance.  The steps from the IFU were followed for dispensing amounts, locations and compression times, e.g 2 aliquots proximally with 3 minutes of compression, and 1 aliquot every 3cm distally with 30 sec of compression along the course of the vessel. Following the last injection and compression sequence, the catheter and introducer sheath were pulled out from the access site. Hemostasis was achieved with manual compression and an adhesive bandage was applied to the incision. Ultrasound confirmed complete coaptation and closure of the treated segments of the GSV, and the absence of any DVT at the saphenofemoral junction. Treatment dose was approximately 1.45 ml and the vein length treated was 37 cm. The drapes were removed and the patient cleaned and prepared for discharge. Post op ultrasound check is scheduled for   48- 72 hours and the patient was given written post-op instructions. Complications: none immediate    Blood Loss: minimal    Conclusion:   Successful Endovenous Ablation of the Great Saphenous Vein with VenaSeal Closure System of the  Left side.       Vickie Longoria MD, [facility] [place]

## 2020-10-30 NOTE — PROGRESS NOTES
VENOUS PRE-PROCEDURE H & P  10/30/2020    Subjective:  Tirso Byrd is a 76 y.o. male    GENERAL - A-Ox3- In no respiratory distress  Heart - Regular rhythm, normal S1, S2, no gallops, no murmurs, no friction rubs  Chest - CTA & percussion    Vein Exam:     Right ext - varicosities, spider and reticular veins Yes, skin changes Yes, ulcers No, edema Yes    Left ext  - varicosities, spider and reticular veins Yes, skin changes Yes, ulcers No, edema Yes    Reflex Study:    Significant reflux noted of the Right Deep Venous System: CFV (2.3s).  Significant reflux noted of the Right GSV at the Distal Calf (0.9s) and the     SSV in the distal portion (2.6s).       Significant reflux noted in the Left Deep Venous System: CFV (1.5s).  Significant reflux noted in the Left GSV at the SFJ (9.5s).       No evidence of DVT or SVT in the bilateral common femoral vein, femoral     vein, popliteal vein, greater saphenous vein or small saphenous vein. Assessment:   Patient with symptomatic C4 disease.     Plan:   Ablation of Left GSV  Gorge Barrera MD, [facility]

## 2020-11-03 ENCOUNTER — PROCEDURE VISIT (OUTPATIENT)
Dept: CARDIOLOGY CLINIC | Age: 65
End: 2020-11-03
Payer: MEDICARE

## 2020-11-03 ENCOUNTER — ANTI-COAG VISIT (OUTPATIENT)
Dept: PHARMACY | Age: 65
End: 2020-11-03
Payer: MEDICARE

## 2020-11-03 ENCOUNTER — OFFICE VISIT (OUTPATIENT)
Dept: BARIATRICS/WEIGHT MGMT | Age: 65
End: 2020-11-03
Payer: MEDICARE

## 2020-11-03 VITALS
OXYGEN SATURATION: 97 % | WEIGHT: 262.6 LBS | HEART RATE: 83 BPM | BODY MASS INDEX: 37.59 KG/M2 | SYSTOLIC BLOOD PRESSURE: 122 MMHG | HEIGHT: 70 IN | TEMPERATURE: 98.3 F | DIASTOLIC BLOOD PRESSURE: 84 MMHG | RESPIRATION RATE: 14 BRPM

## 2020-11-03 DIAGNOSIS — I48.91 ATRIAL FIBRILLATION, UNSPECIFIED TYPE (HCC): ICD-10-CM

## 2020-11-03 LAB
INR BLD: 1.77 (ref 0.86–1.14)
PROTHROMBIN TIME: 20.7 SEC (ref 10–13.2)

## 2020-11-03 PROCEDURE — G8427 DOCREV CUR MEDS BY ELIG CLIN: HCPCS | Performed by: SURGERY

## 2020-11-03 PROCEDURE — G8417 CALC BMI ABV UP PARAM F/U: HCPCS | Performed by: SURGERY

## 2020-11-03 PROCEDURE — 3017F COLORECTAL CA SCREEN DOC REV: CPT | Performed by: SURGERY

## 2020-11-03 PROCEDURE — 93971 EXTREMITY STUDY: CPT | Performed by: INTERNAL MEDICINE

## 2020-11-03 PROCEDURE — 4040F PNEUMOC VAC/ADMIN/RCVD: CPT | Performed by: SURGERY

## 2020-11-03 PROCEDURE — 99212 OFFICE O/P EST SF 10 MIN: CPT

## 2020-11-03 PROCEDURE — 99213 OFFICE O/P EST LOW 20 MIN: CPT | Performed by: SURGERY

## 2020-11-03 PROCEDURE — 1123F ACP DISCUSS/DSCN MKR DOCD: CPT | Performed by: SURGERY

## 2020-11-03 PROCEDURE — 1036F TOBACCO NON-USER: CPT | Performed by: SURGERY

## 2020-11-03 PROCEDURE — G8484 FLU IMMUNIZE NO ADMIN: HCPCS | Performed by: SURGERY

## 2020-11-03 ASSESSMENT — ENCOUNTER SYMPTOMS
GASTROINTESTINAL NEGATIVE: 1
RESPIRATORY NEGATIVE: 1
EYES NEGATIVE: 1
CONSTIPATION: 0
ALLERGIC/IMMUNOLOGIC NEGATIVE: 1
DIARRHEA: 0
ABDOMINAL PAIN: 0
ABDOMINAL DISTENTION: 0
COLOR CHANGE: 1

## 2020-11-03 NOTE — PROGRESS NOTES
09/11/2019    This is a normal study.     Hx of Venous Doppler ultrasound 10/14/2019    No DVT or SVT, Significant reflux in RCFV, RGSV, RSSV, LCFV, LGSV    Left arm pain 09/09/2019    Non-smoker     Obesity     Sleep apnea     TIA (transient ischemic attack) 2008     Family History   Problem Relation Age of Onset    Heart Attack Mother     Cancer Mother         ovarian    Other Mother         dementia    Other Father         murdered    Cancer Father         prostate cancer     Social History     Socioeconomic History    Marital status:      Spouse name: Not on file    Number of children: Not on file    Years of education: Not on file    Highest education level: Not on file   Occupational History    Not on file   Social Needs    Financial resource strain: Not on file    Food insecurity     Worry: Not on file     Inability: Not on file    Transportation needs     Medical: Not on file     Non-medical: Not on file   Tobacco Use    Smoking status: Never Smoker    Smokeless tobacco: Never Used   Substance and Sexual Activity    Alcohol use: Not Currently    Drug use: Never    Sexual activity: Not on file   Lifestyle    Physical activity     Days per week: Not on file     Minutes per session: Not on file    Stress: Not on file   Relationships    Social connections     Talks on phone: Not on file     Gets together: Not on file     Attends Confucianism service: Not on file     Active member of club or organization: Not on file     Attends meetings of clubs or organizations: Not on file     Relationship status: Not on file    Intimate partner violence     Fear of current or ex partner: Not on file     Emotionally abused: Not on file     Physically abused: Not on file     Forced sexual activity: Not on file   Other Topics Concern    Not on file   Social History Narrative    Not on file       Current Outpatient Medications   Medication Sig Dispense Refill    sotalol (BETAPACE) 80 MG tablet Take 1 tablet by mouth 2 times daily 60 tablet 5    Omega-3 Fatty Acids (FISH OIL PO) Take by mouth      warfarin (COUMADIN) 7.5 MG tablet TAKE 1 TABLET BY MOUTH EVERY OTHER DAY 45 tablet 3    warfarin (COUMADIN) 6 MG tablet TAKE 1 TABLET BY MOUTH EVERY OTHER DAY 90 tablet 1    magnesium oxide (MAG-OX) 400 (241.3 Mg) MG TABS tablet Take 0.5 tablets by mouth daily (Patient taking differently: Take 400 mg by mouth daily ) 30 tablet 0    warfarin (COUMADIN) 6 MG tablet Take 1 tablet by mouth every other day Alternating with 7.5mg every other day (Patient not taking: Reported on 11/3/2020) 45 tablet 1    warfarin (COUMADIN) 7.5 MG tablet Take 1 tablet by mouth every other day Alternating with 6mg every other day (Patient not taking: Reported on 11/3/2020) 45 tablet 1    PARoxetine (PAXIL) 10 MG tablet Take 1 tablet by mouth every morning (Patient not taking: Reported on 10/6/2020) 90 tablet 1     No current facility-administered medications for this visit. Allergies   Allergen Reactions    Aspirin        Review of Systems:         Review of Systems   Constitutional: Negative. HENT: Negative. Eyes: Negative. Respiratory: Negative. Cardiovascular: Negative. Gastrointestinal: Negative. Negative for abdominal distention, abdominal pain, constipation and diarrhea. Endocrine: Negative. Genitourinary: Negative. Musculoskeletal: Negative. Skin: Positive for color change. Allergic/Immunologic: Negative. Neurological: Negative. Hematological: Negative. Psychiatric/Behavioral: Negative. All other systems reviewed and are negative. OBJECTIVE:  Physical Exam:    /84   Pulse 83   Temp 98.3 °F (36.8 °C) (Infrared)   Resp 14   Ht 5' 10\" (1.778 m)   Wt 262 lb 9.6 oz (119.1 kg)   SpO2 97%   BMI 37.68 kg/m²      Physical Exam  Vitals signs reviewed. Constitutional:       General: He is not in acute distress. Appearance: He is obese.  He is not ill-appearing, toxic-appearing or diaphoretic. HENT:      Head: Normocephalic and atraumatic. Right Ear: External ear normal.      Left Ear: External ear normal.      Nose: Nose normal.   Eyes:      General:         Right eye: No discharge. Left eye: No discharge. Extraocular Movements: Extraocular movements intact. Neck:      Musculoskeletal: Normal range of motion. Cardiovascular:      Rate and Rhythm: Normal rate. Pulmonary:      Effort: No respiratory distress. Abdominal:      Palpations: Abdomen is soft. Tenderness: There is no abdominal tenderness. Comments: Incisions well-healed. Approx 4 cm right sided lower abdominal skin lesion, dark brown in color, raised   Musculoskeletal:         General: No swelling. Skin:     General: Skin is warm. Neurological:      General: No focal deficit present. Mental Status: He is alert. Psychiatric:         Mood and Affect: Mood normal.           ASSESSMENT:  1. Diverticulosis of small intestine without hemorrhage    2. Skin lesions          PLAN:  Treatment:      -Doing well from surgery. No new issues.    -Abdominal skin lesion. Given size and characteristics, recommended excision. Consent obtained. Pt agreeable. Reviewed in detail with the patient and/or family the expected pre-operative, operative, and post-operative courses including risks, benefits, and alternatives to the procedure. The patient's questions were answered in detail and agreed to proceed with the procedure. Does not need to hold coumadin prior to surgery. Will need pre op covid testing. The patient was counseled at length about the risks of yamileth Covid-19 during their perioperative period and any recovery window from their procedure. The patient was made aware that yamileth Covid-19  may worsen their prognosis for recovering from their procedure  and lend to a higher morbidity and/or mortality risk.   All material risks, benefits, and reasonable alternatives

## 2020-11-03 NOTE — PROGRESS NOTES
Medication Management Service  [address] [phone]    Visit Date: 11/3/2020   Subjective: All appointments have been changed to telephone visits at this time due to COVID-19. Park Kam is a 76 y.o. male who is having INR checked by [facility]. INR results were sent to the clinic for anticoagulation monitoring and adjustment. Patient results called in to clinic for warfarin management due to  Indication:   atrial fibrillation. INR goal: of 2.0-3.0. Duration of therapy: indefinite. Patient reports the following:   Adherent with regimen  Missed or extra doses:  None   Bleeding or thromboembolic side effects:  None  Significant medication changes:  None  Significant dietary changes: None  Significant alcohol or tobacco changes: None  Significant recent illness, disease state changes, or hospitalization:  None  Upcoming surgeries or procedures:  None  Falls: None           Assessment and Plan     PT/INR performed by Lab. INR today is 1.77, subtherapeutic. Patient denies missed doses. I will investigate a lab or coumadin clinic closer to patient's current home   Plan:  Take warfarin 9mg today then will continue current regimen of warfarin 6mg alternating with 7.5mg. Recheck INR in 4 week(s). Patient will not be back in town until around December 1. Patient has standing lab orders for PT/INR. Patient verbalized understanding of dosing directions and information discussed. Progress note sent to referring office. Patient acknowledges working in consult agreement with pharmacist as referred by his/her physician. Patient accepted that for purposes of billing, this is a virtual visit with your provider for which we will submit a claim for reimbursement with your insurance company. You may be responsible for any copays, coinsurance amounts or other amounts not covered by your insurance company. Electronically signed by Lee Ye [facility] on 11/3/20 at 4:19 PM EST    CLINICAL PHARMACY CONSULT: MED RECONCILIATION/REVIEW ADDENDUM    For Pharmacy Admin Tracking Only    PHSO: Yes  Total # of Interventions Recommended: 1  - Increased Dose #: 1  - Maintenance Safety Lab Monitoring #: 1  Total Interventions Accepted: 1  Time Spent (min): Janessa PowersD

## 2020-11-04 ENCOUNTER — VIRTUAL VISIT (OUTPATIENT)
Dept: CARDIOLOGY CLINIC | Age: 65
End: 2020-11-04
Payer: MEDICARE

## 2020-11-04 PROCEDURE — 99442 PR PHYS/QHP TELEPHONE EVALUATION 11-20 MIN: CPT | Performed by: INTERNAL MEDICINE

## 2020-11-04 ASSESSMENT — ENCOUNTER SYMPTOMS
EYE PAIN: 0
SHORTNESS OF BREATH: 0
COLOR CHANGE: 0
CHEST TIGHTNESS: 0
CONSTIPATION: 0
PHOTOPHOBIA: 0
ABDOMINAL PAIN: 0
NAUSEA: 0
COUGH: 0
BACK PAIN: 0
VOMITING: 0
DIARRHEA: 0
BLOOD IN STOOL: 0
WHEEZING: 0

## 2020-11-04 NOTE — PROGRESS NOTES
continous feeling of fatigue. Patient states his blood pressure has been running low. Patient denies alcohol, caffiene. Previous visit:    Chief Complaint   Patient presents with    Atrial Fibrillation     Consult for AFIB. He denies any chest pain,SOB,dizziness, some swelling to legs due to past injury. Sometimes feels palpitations. Pastmedical history:   Past Medical History:   Diagnosis Date    Abnormal EKG     Atrial fibrillation (HCC) 1986    Depression     Family history of coronary artery disease     Mother-MI.    H/O cardiac catheterization 11/12/2019    No significant CAD.    H/O echocardiogram 09/11/2019    EF 55-60%, Mild septal wall asymmetrical LVH.  History of cardiac monitoring [id number]    30 Day monitor: Sinus rhythm noted. Minimum hr 46 bpm, Average hr 57 bpm, Max  bpm, atrial fibrillation and flutter episodes noted PAF episodes. In atrial arrhythmia average HR was noted to be 64 bpm. Longest was 20 minutes. Issue is 1%      History of exercise stress test 09/09/2019    treadmill    History of nuclear stress test 09/11/2019    This is a normal study.     Hx of Venous Doppler ultrasound 10/14/2019    No DVT or SVT, Significant reflux in RCFV, RGSV, RSSV, LCFV, LGSV    Left arm pain 09/09/2019    Non-smoker     Obesity     Sleep apnea     TIA (transient ischemic attack) 2008       Surgical history :   Past Surgical History:   Procedure Laterality Date    ABDOMEN SURGERY  02/2019    bowel perforation repair    ABDOMEN SURGERY  08/2020    blwel perforation repair    ABLATION OF DYSRHYTHMIC FOCUS  09/24/2020    AVNRT ablation    CHOLECYSTECTOMY      FOOT SURGERY Bilateral     LAPAROTOMY N/A 8/6/2020    LAPAROTOMY EXPLORATORY WITH SMALL BOWEL OBSTRUCTION AND PRIMARY ANASTOMOSIS AND EXTENSIVE LYSIS OF ADHESIONS performed by Bk Mario MD at [address] [place] [address]  2017    compartment syndrome right leg    TONSILLECTOMY         Family history:   Family History   Problem Relation Age of Onset    Heart Attack Mother     Cancer Mother         ovarian    Other Mother         dementia    Other Father         murdered    Cancer Father         prostate cancer       Social history :  reports that he has never smoked. He has never used smokeless tobacco. He reports previous alcohol use. He reports that he does not use drugs. Allergies   Allergen Reactions    Aspirin        Current Outpatient Medications on File Prior to Visit   Medication Sig Dispense Refill    warfarin (COUMADIN) 7.5 MG tablet Take 1 tablet by mouth every other day Alternating with 6mg every other day 45 tablet 1    sotalol (BETAPACE) 80 MG tablet Take 1 tablet by mouth 2 times daily 60 tablet 5    Omega-3 Fatty Acids (FISH OIL PO) Take by mouth      warfarin (COUMADIN) 6 MG tablet TAKE 1 TABLET BY MOUTH EVERY OTHER DAY 90 tablet 1    magnesium oxide (MAG-OX) 400 (241.3 Mg) MG TABS tablet Take 0.5 tablets by mouth daily (Patient taking differently: Take 400 mg by mouth daily ) 30 tablet 0    PARoxetine (PAXIL) 10 MG tablet Take 1 tablet by mouth every morning (Patient not taking: Reported on 10/6/2020) 90 tablet 1     No current facility-administered medications on file prior to visit. Review of Systems:   Review of Systems   Constitutional: Negative for activity change, chills, fatigue and fever. HENT: Negative for congestion, ear pain and tinnitus. Eyes: Negative for photophobia, pain and visual disturbance. Respiratory: Negative for cough, chest tightness, shortness of breath and wheezing. Cardiovascular: Negative for chest pain, palpitations and leg swelling. Gastrointestinal: Negative for abdominal pain, blood in stool, constipation, diarrhea, nausea and vomiting. Endocrine: Negative for cold intolerance and heat intolerance. Genitourinary: Negative for dysuria, flank pain and hematuria.    Musculoskeletal: Negative for arthralgias, back pain, myalgias and neck stiffness. Skin: Negative for color change and rash. Allergic/Immunologic: Negative for food allergies. Neurological: Negative for dizziness, light-headedness, numbness and headaches. Hematological: Does not bruise/bleed easily. Psychiatric/Behavioral: Negative for agitation, behavioral problems and confusion. Examination:      Patient-Reported Vitals 11/4/2020   Patient-Reported Weight 257lb   Patient-Reported Height 5 10   Patient-Reported Systolic 620   Patient-Reported Diastolic 72   Patient-Reported Pulse 68      Physical examination was not performed as this is patient initiated telephone visit. Previous clinic examination as mentioned below    There were no vitals filed for this visit. There is no height or weight on file to calculate BMI. Physical Exam  Constitutional:       General: He is not in acute distress. Appearance: He is well-developed. HENT:      Head: Normocephalic and atraumatic. Eyes:      Pupils: Pupils are equal, round, and reactive to light. Neck:      Musculoskeletal: Normal range of motion. Vascular: No JVD. Cardiovascular:      Rate and Rhythm: Regular rhythm. Bradycardia present. Heart sounds: No murmur. No friction rub. Pulmonary:      Effort: Pulmonary effort is normal. No respiratory distress. Breath sounds: Normal breath sounds. No wheezing or rales. Abdominal:      General: Bowel sounds are normal. There is no distension. Palpations: Abdomen is soft. Tenderness: There is no abdominal tenderness. Musculoskeletal:         General: No tenderness. Skin:     General: Skin is warm and dry. Neurological:      Mental Status: He is alert and oriented to person, place, and time. Cranial Nerves: No cranial nerve deficit.                CBC:   Lab Results   Component Value Date    WBC 6.6 09/23/2020    HGB 17.2 09/24/2020    HCT 51.0 09/24/2020     09/23/2020     Lipids:No results found for: CHOL, TRIG, HDL, LDLCALC, LDLDIRECT  PT/INR:   Lab Results   Component Value Date    INR 1.77 11/03/2020        BMP:    Lab Results   Component Value Date     09/24/2020    K 4.0 09/24/2020     09/23/2020    CO2 29 09/24/2020    BUN 11 09/23/2020     CMP:   Lab Results   Component Value Date    AST 20 08/07/2020    PROT 6.2 (L) 08/07/2020    BILITOT 1.9 (H) 08/07/2020    ALKPHOS 88 08/07/2020     TSH:  No results found for: TSH          IMPRESSION / RECOMMENDATIONS:     1. SP avnrt ablation  2. ? Exercise induced paf  3. LVH   4. TIA    Patient was initially scheduled for atrial fibrillation ablation but during the EP study patient was in AVNRT and post AVNRT ablation we could not induce any atrial fibrillation so we did not perform atrial fibrillation ablation at that time. Patient has history of TIA and he feels good and wants to see if he can come off the blood thinner at this point. Recommend loop recorder to assess for PAF management. If the patient does not have any PAF episodes may be we can come off Coumadin and just put him on Plavix as patient is allergic to aspirin    He is also on sotalol. So discussed with the patient if the loop recorder shows atrial fibrillation then we may have to consider A. fib ablation at that time    Discussed risk with the loop recorder procedure and patient wants to consider management. Documentation:  I communicated with the patient and/or health care decision maker about PAF. Details of this discussion including any medical advice provided: PAF management and loop recorder      I affirm this is a Patient Initiated Episode with a Patient who has not had a related appointment within my department in the past 7 days or scheduled within the next 24 hours.     Patient identification was verified at the start of the visit: Yes    Total Time: minutes: 11-20 minutes    Note: not billable if this call serves to triage the patient into an appointment for the relevant concern      Dilan Negrete      Thanks again for allowing me to participate in care of this patient. Please call me if you have any questions. With best regards. Toby Leonard MD, 11/4/2020 12:51 PM     Please note this report has been partially produced using speech recognition software and may contain errors related to that system including errors in grammar, punctuation, and spelling, as well as words and phrases that may be inappropriate. If there are any questions or concerns please feel free to contact the dictating provider for clarification.

## 2020-11-19 ENCOUNTER — TELEPHONE (OUTPATIENT)
Dept: BARIATRICS/WEIGHT MGMT | Age: 65
End: 2020-11-19

## 2020-11-19 ENCOUNTER — TELEPHONE (OUTPATIENT)
Dept: CARDIOLOGY CLINIC | Age: 65
End: 2020-11-19

## 2020-11-19 NOTE — TELEPHONE ENCOUNTER
LEFT MESSAGE FOR Darlene Blount REGARDING SURGERY (EXCISION OF RIGHT SIDED ABD SKIN LESION) SCHEDULED @ [facility].  NOTIFIED OF DATES, TIMES AND LOCATION    PHONE ASSESSMENT   NEEDS TO HAVE COVID DONE - CAN SEND ORDER FOR HIM   SURGERY - 11/30/20 @ 9181  P/O - PLEASSE ASK PATIENT WHEN IT WORKS FOR HIM (NEEDS TO BE WITHIN 7-10 DAYS POST SURGERY)    NPO AFTER MIDNIGHT   BLOOD THINNERS - PT CANNOT HOLD BLOOD THINNERS DUE TO ABLATION

## 2020-11-19 NOTE — LETTER
[facility]) Informed Consent for Anesthesia/Sedation, Surgery, Invasive Procedures, and other High-risk Interventions and Medication use     *This consent is applicable for 30 days following patient signature*    Procedure(s)   I, Mindy Hernandez   and the associate(s) or assistant(s) of his/her choice, to perform the following procedure(s): LOOP RECORDER (LINQ) IMPLANT    I know that unexpected conditions may require additional or different procedures than those above. I authorize the above named practitioner(s) perform these as necessary and desirable. This is based on the practitioners professional judgment. The above named practitioner has discussed the above procedure(s) with me, including:  ? Potential benefits, including likelihood of success of the procedure(s) goals  ? Risks  ? Side effects, risk of death, and risk of infection  ? Any potential problems that might occur during recuperation or healing post-procedure  ? Reasonable alternatives  ? Risks of NOT performing the procedure(s)    I acknowledge that no warranty or guarantee has been made to the results the procedure(s). I consent to the above named practitioner(s) providing additional services to me as deemed reasonable and necessary, including but not limited to:    ? Use of medications for anesthesia or sedation. ? All anesthesia and sedation carry risks. My practitioner has discussed my anticipated anesthesia and/or sedation and the risks of using, risk of not using, benefits, side effects, and alternatives. ? Use of pathology  ? I authorize [facility]) to dispose of tissues, specimens or organs when pathology is complete. ? Use of radiology  ? A contrast agent may be required for radiology procedures. My practitioner has advised me of the risks of using, risks of not using, benefits, side effects, and alternatives. ? Observers or use of photography, video/audio recording, or televising of the procedure(s). This is for medical, scientific, or educational purposes. This includes appropriate portions of my body. My identity will not be revealed. ? I consent to release of my social security number and other identifying information to [employer] (FDA), and the supplier/, if I receive tissue, a device, or implant. This is to track the tissue, device, or implant for defect, recall, infection, etc.     ? Use of blood and/or blood products, if needed, through my hospital stay. My practitioner has advised me of the risks of using, risks of not using, benefits, side effects, and alternatives. ___ I do NOT want Blood or Blood products given. (Complete separate  refusal form)    Code Status (sabino one):  ___ I do NOT HAVE a DNR order. I am a Full-code.   I will receive CPR, intubation,  chest compressions, medications, and/or other life saving measures if I have a  cardiac or respiratory arrest.    ___ I have a Do Not Resuscitate (DNR)order.   (sabino one below)  ___  I rescind my DNR for surgery and immediate post-operative period through Phase 2 recovery. This means, for that time period, I will be a Full-code and receive CPR, intubation, chest compressions, medications, and/or other life saving measures, if I have a cardiac or respiratory arrest.    ___ I WANT to keep my DNR in effect during my procedure(s) and immediate post-operative recovery period through Phase 2 recovery. (Complete separate refusal form)     This form has been fully explained to me. I understand its contents.       Patients Signature: ___________________________Date: ________  Time: ________    If patient unable to sign, has engaged the [address] [place] [address] Booster, is a minor, or has a court-appointed Guardian:  [facility] Representative Name (Print): ____________________________________      Relationship ([place] one):    Guardian   Parent    Spouse    HCPOA   Child   Sibling  Next-of-Kin Friend    Patients Representative Signature: _______________________________________              Date: ______________  Time: __________    An  was used.  name/ID: _________________________________      [facility] Witness________________________  Date: ________   Time: _________    Physician/Practitioner _______________________  Date: ________   Time: _________        Revision 2017      [facility]     Dr. Yoselin Byrnes       PATIENT NAME:    Washington Breath                               :   1955    PROCEDURE: LOOP RECORDER LINQ    DATE OF PROCEDURE: 2020    DIAGNOSIS:   I48.9, Z01.810          X   MAG    X   PHOS       X   BMP             X   CBC    X   PT            X   PTT X Rapid COVID                        ? PLEASE CALL ABNORMAL RESULTS TO THE REQUESTING PHYSICIAN? ATTENTION PATIENTS:    You do not have to fast for the lab work.  You must go to the [facility], [address] [place] [address] Way or [facility]      Physician Signature:_____________________Date:_________Time:____________

## 2020-11-19 NOTE — TELEPHONE ENCOUNTER
Spoke with patient regarding pre-testing for loop implant. [place] [facility] [place] but is coming back to [place] on 12/1 for an OV with  and to have rapid COVID done at [facility]. Patient is on coumadin and would technically need INR checked on 11/30. Spoke with Cassie Sebastian and he is ok with patient having INR checked on 12/1 as his INR has not been over 2 the last several times he has had it checked. Informed patient that all pre-testing (COVID and blood work) will be done at [facility] on 12/1. Patient voiced understanding.

## 2020-11-23 ENCOUNTER — TELEPHONE (OUTPATIENT)
Dept: BARIATRICS/WEIGHT MGMT | Age: 65
End: 2020-11-23

## 2020-11-23 NOTE — TELEPHONE ENCOUNTER
Pancho Joy #[id number]     11/30/2020     CPT 69651     ICD 10 l98.9     [address] [facility] outpatient 11/30/20

## 2020-11-30 ENCOUNTER — TELEPHONE (OUTPATIENT)
Dept: BARIATRICS/WEIGHT MGMT | Age: 65
End: 2020-11-30

## 2020-11-30 NOTE — TELEPHONE ENCOUNTER
SPOKE TO PATIENT - RESCHEDULED SURGERY.     SURGERY - 12/3/20 @ 1130  P/O - 12/10/20 @ 1100 (PT WILL LET US KNOW IF THIS DATE DOES NOT WORK FOR HIM)    NPO AFTER MIDNIGHT

## 2020-12-01 ENCOUNTER — OFFICE VISIT (OUTPATIENT)
Dept: CARDIOLOGY CLINIC | Age: 65
End: 2020-12-01
Payer: MEDICARE

## 2020-12-01 ENCOUNTER — TELEPHONE (OUTPATIENT)
Dept: PHARMACY | Age: 65
End: 2020-12-01

## 2020-12-01 ENCOUNTER — HOSPITAL ENCOUNTER (OUTPATIENT)
Age: 65
Discharge: HOME | End: 2020-12-01
Payer: MEDICARE

## 2020-12-01 ENCOUNTER — TELEPHONE (OUTPATIENT)
Dept: CARDIOLOGY CLINIC | Age: 65
End: 2020-12-01

## 2020-12-01 ENCOUNTER — PROCEDURE VISIT (OUTPATIENT)
Dept: CARDIOLOGY CLINIC | Age: 65
End: 2020-12-01
Payer: MEDICARE

## 2020-12-01 VITALS
DIASTOLIC BLOOD PRESSURE: 72 MMHG | BODY MASS INDEX: 38.08 KG/M2 | SYSTOLIC BLOOD PRESSURE: 120 MMHG | HEIGHT: 70 IN | HEART RATE: 57 BPM | WEIGHT: 266 LBS

## 2020-12-01 LAB
INR BLD: 2.36 INDEX
PROTHROMBIN TIME: 28.8 SECONDS (ref 11.7–14.5)

## 2020-12-01 PROCEDURE — 85610 PROTHROMBIN TIME: CPT

## 2020-12-01 PROCEDURE — 3017F COLORECTAL CA SCREEN DOC REV: CPT | Performed by: INTERNAL MEDICINE

## 2020-12-01 PROCEDURE — 1036F TOBACCO NON-USER: CPT | Performed by: INTERNAL MEDICINE

## 2020-12-01 PROCEDURE — 99213 OFFICE O/P EST LOW 20 MIN: CPT | Performed by: INTERNAL MEDICINE

## 2020-12-01 PROCEDURE — G8427 DOCREV CUR MEDS BY ELIG CLIN: HCPCS | Performed by: INTERNAL MEDICINE

## 2020-12-01 PROCEDURE — 4040F PNEUMOC VAC/ADMIN/RCVD: CPT | Performed by: INTERNAL MEDICINE

## 2020-12-01 PROCEDURE — G8484 FLU IMMUNIZE NO ADMIN: HCPCS | Performed by: INTERNAL MEDICINE

## 2020-12-01 PROCEDURE — G8417 CALC BMI ABV UP PARAM F/U: HCPCS | Performed by: INTERNAL MEDICINE

## 2020-12-01 PROCEDURE — 1123F ACP DISCUSS/DSCN MKR DOCD: CPT | Performed by: INTERNAL MEDICINE

## 2020-12-01 PROCEDURE — 93971 EXTREMITY STUDY: CPT | Performed by: INTERNAL MEDICINE

## 2020-12-01 PROCEDURE — 36415 COLL VENOUS BLD VENIPUNCTURE: CPT

## 2020-12-01 NOTE — LETTER
[address] [address] [address] [address]  Phone: [phone]  Fax: [phone]    Melva Schmitt MD        December 1, 2020     Mya Young MD  [facility] [address] [facility]    Patient: Salud Duffy  MR Number: [id number]  YOB: 1955  Date of Visit: 12/1/2020    Dear Dr. Mya Young:    Thank you for the request for consultation for Rexine Smoke to me for the evaluation of CVI. Below are the relevant portions of my assessment and plan of care. If you have questions, please do not hesitate to call me. I look forward to following Romayne Nao along with you.     Sincerely,        Melva Schmitt MD

## 2020-12-01 NOTE — TELEPHONE ENCOUNTER
Patient scheduled for INR at lab 11/30. Patient went to [address] [place] [address] lab for INR 12/1. Chart reviewed and patient scheduled for procedure with Dr. Dave Lynch 12/2. Called [facility] with result in order for patient to receive directions for procedure.      CLINICAL PHARMACY CONSULT: MED RECONCILIATION/REVIEW ADDENDUM    For Pharmacy Admin Tracking Only    PHSO: No  Total # of Interventions Recommended: 0    Total Interventions Accepted: 0  Time Spent (min): 5    Hilario Hickman, JanessaD
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
## 2020-12-01 NOTE — LETTER
[address] [address] [address]  Phone: [phone]  Fax: [phone]    Chaitanya Arias MD        December 1, 2020     Dennis Gallegos MD  [place] [address] [address] [facility]    Patient: Diana Mcclendon  MR Number: [id number]  YOB: 1955  Date of Visit: 12/1/2020    Dear Dr. Dennis Gallegos:    Thank you for the request for consultation for Isaac Noble to me for the evaluation of cvi. Below are the relevant portions of my assessment and plan of care. If you have questions, please do not hesitate to call me. I look forward to following Tirso Byrd along with you.     Sincerely,        Chaitanya Arias MD

## 2020-12-01 NOTE — TELEPHONE ENCOUNTER
[facility] from Coumadin clinic called states patient was there today and had INR done. INR is 2.36 patient is scheduled for Loop recorder tomorrow. Will advise Dr Aure Busch.

## 2020-12-01 NOTE — TELEPHONE ENCOUNTER
Advised Dr Jose Luis Marrufo of INR being 2.36 and patient has not received COVID testing. There are multiple messages from DARLINE Beard RN and a letter was sent to patient. Per Kashif Murdock if COVID testing was not completed then patients procedure will be CANCELLED.     1733 pm tried to call patient no answer will try again tomorrow morning to reach patient.

## 2020-12-02 ENCOUNTER — ANESTHESIA EVENT (OUTPATIENT)
Dept: OPERATING ROOM | Age: 65
End: 2020-12-02
Payer: MEDICARE

## 2020-12-02 ENCOUNTER — TELEPHONE (OUTPATIENT)
Dept: CARDIOLOGY CLINIC | Age: 65
End: 2020-12-02

## 2020-12-02 ENCOUNTER — HOSPITAL ENCOUNTER (OUTPATIENT)
Dept: CARDIAC CATH/INVASIVE PROCEDURES | Age: 65
Discharge: HOME | End: 2020-12-02
Attending: INTERNAL MEDICINE | Admitting: INTERNAL MEDICINE
Payer: MEDICARE

## 2020-12-02 VITALS
WEIGHT: 266 LBS | HEIGHT: 70 IN | HEART RATE: 58 BPM | DIASTOLIC BLOOD PRESSURE: 92 MMHG | RESPIRATION RATE: 18 BRPM | TEMPERATURE: 97.1 F | BODY MASS INDEX: 38.08 KG/M2 | SYSTOLIC BLOOD PRESSURE: 153 MMHG | OXYGEN SATURATION: 97 %

## 2020-12-02 LAB
ANION GAP SERPL CALCULATED.3IONS-SCNC: 12 MMOL/L (ref 4–16)
APTT: 33.6 SECONDS (ref 25.1–37.1)
BUN BLDV-MCNC: 9 MG/DL (ref 6–23)
CALCIUM SERPL-MCNC: 8.7 MG/DL (ref 8.3–10.6)
CHLORIDE BLD-SCNC: 102 MMOL/L (ref 99–110)
CO2: 22 MMOL/L (ref 21–32)
CREAT SERPL-MCNC: 0.7 MG/DL (ref 0.9–1.3)
GFR AFRICAN AMERICAN: >60 ML/MIN/1.73M2
GFR NON-AFRICAN AMERICAN: >60 ML/MIN/1.73M2
GLUCOSE BLD-MCNC: 93 MG/DL (ref 70–99)
HCT VFR BLD CALC: 50.2 % (ref 42–52)
HEMOGLOBIN: 17.1 GM/DL (ref 13.5–18)
INR BLD: 2.16 INDEX
MAGNESIUM: 2.2 MG/DL (ref 1.8–2.4)
MCH RBC QN AUTO: 32 PG (ref 27–31)
MCHC RBC AUTO-ENTMCNC: 34.1 % (ref 32–36)
MCV RBC AUTO: 93.8 FL (ref 78–100)
PDW BLD-RTO: 13 % (ref 11.7–14.9)
PHOSPHORUS: 2.8 MG/DL (ref 2.5–4.9)
PLATELET # BLD: 215 K/CU MM (ref 140–440)
PMV BLD AUTO: 10.6 FL (ref 7.5–11.1)
POTASSIUM SERPL-SCNC: 4 MMOL/L (ref 3.5–5.1)
PROTHROMBIN TIME: 26.4 SECONDS (ref 11.7–14.5)
RBC # BLD: 5.35 M/CU MM (ref 4.6–6.2)
SODIUM BLD-SCNC: 136 MMOL/L (ref 135–145)
WBC # BLD: 8.4 K/CU MM (ref 4–10.5)

## 2020-12-02 PROCEDURE — 80048 BASIC METABOLIC PNL TOTAL CA: CPT

## 2020-12-02 PROCEDURE — 85027 COMPLETE CBC AUTOMATED: CPT

## 2020-12-02 PROCEDURE — C1764 EVENT RECORDER, CARDIAC: HCPCS

## 2020-12-02 PROCEDURE — 84100 ASSAY OF PHOSPHORUS: CPT

## 2020-12-02 PROCEDURE — 33285 INSJ SUBQ CAR RHYTHM MNTR: CPT | Performed by: INTERNAL MEDICINE

## 2020-12-02 PROCEDURE — 85610 PROTHROMBIN TIME: CPT

## 2020-12-02 PROCEDURE — 85730 THROMBOPLASTIN TIME PARTIAL: CPT

## 2020-12-02 PROCEDURE — 2709999900 HC NON-CHARGEABLE SUPPLY

## 2020-12-02 PROCEDURE — 83735 ASSAY OF MAGNESIUM: CPT

## 2020-12-02 PROCEDURE — 33285 INSJ SUBQ CAR RHYTHM MNTR: CPT

## 2020-12-02 ASSESSMENT — ENCOUNTER SYMPTOMS
VOMITING: 0
BLOOD IN STOOL: 0
SHORTNESS OF BREATH: 0
COUGH: 0
BACK PAIN: 0
NAUSEA: 0
WHEEZING: 0
COLOR CHANGE: 0
CONSTIPATION: 0
PHOTOPHOBIA: 0
EYE PAIN: 0
ABDOMINAL PAIN: 0
CHEST TIGHTNESS: 0
DIARRHEA: 0

## 2020-12-02 NOTE — DISCHARGE SUMMARY
Loop recorder placed and patient recovered for 1 hour post procedure. No sedation given. Discharge instructions reviewed and questions answered. Patient verbalizes understanding. Patient will resume coumadin today and follow up in the office on 12/18/20 for site check. Dressing can come off in 10 days per Dr Paddy Walker. Patient dressed and up to the bathroom. Out to car by wheelchair. Wife to drive home.

## 2020-12-02 NOTE — DISCHARGE INSTR - OTHER ORDERS
Remove dressing to loop recorder site in 10 days. Call the office at [phone] if redness,bleeding or signs of infection.

## 2020-12-02 NOTE — ANESTHESIA PRE PROCEDURE
Department of Anesthesiology  Preprocedure Note       Name:  Waylon Santana   Age:  76 y.o.  :  1955                                          MRN:  [id number]         Date:  2020      Surgeon: Yossi Jacinto):  Kaylee Johnson MD    Procedure:     Medications prior to admission:   Prior to Admission medications    Medication Sig Start Date End Date Taking? Authorizing Provider   warfarin (COUMADIN) 7.5 MG tablet Take 1 tablet by mouth every other day Alternating with 6mg every other day 20   Sandra Scott MD   sotalol (BETAPACE) 80 MG tablet Take 1 tablet by mouth 2 times daily 20   Serina Ramos MD   Omega-3 Fatty Acids (FISH OIL PO) Take by mouth    Historical Provider, MD   warfarin (COUMADIN) 6 MG tablet TAKE 1 TABLET BY MOUTH EVERY OTHER DAY 19   Lottie Anaya MD   magnesium oxide (MAG-OX) 400 (241.3 Mg) MG TABS tablet Take 0.5 tablets by mouth daily  Patient taking differently: Take 400 mg by mouth daily  19   INES Stacy CNP       Current medications:    No current outpatient medications on file. No current facility-administered medications for this visit. Allergies:     Allergies   Allergen Reactions    Aspirin Anaphylaxis     \"throat swells up\"       Problem List:    Patient Active Problem List   Diagnosis Code    Essential hypertension I10    Depression F32.9    Atrial fibrillation ([address] I48.91    Sleep apnea G47.30    S/P compartment syndrome decompression Z98.890    Anticoagulated on Coumadin Z79.01    TIA (transient ischemic attack) G45.9    Leg swelling M79.89    Left arm pain M79.602    Precordial pain R07.2    A-fib ([address] I48.91    Encounter for monitoring sotalol therapy Z51.81, Z79.899    Bowel perforation (HCC) K63.1    Chronic anticoagulation Z79.01    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    Varicose veins of both legs with edema K04.828       Past Medical History:        Diagnosis Date    Abnormal results found for: Krys Quezada    Drug/Infectious Status (If Applicable):  No results found for: HIV, HEPCAB    COVID-19 Screening (If Applicable):   Lab Results   Component Value Date    COVID19 NOT DETECTED 09/17/2020         Anesthesia Evaluation  Patient summary reviewed no history of anesthetic complications:   Airway: Mallampati: III  TM distance: >3 FB   Neck ROM: full  Mouth opening: > = 3 FB Dental:      Comment: 1 crown      Pulmonary: breath sounds clear to auscultation  (+) sleep apnea:                             Cardiovascular:    (+) hypertension:, dysrhythmias: atrial fibrillation,       ECG reviewed  Rhythm: irregular  Rate: normal  Echocardiogram reviewed         Beta Blocker:  Dose within 24 Hrs         Neuro/Psych:   (+) TIA, psychiatric history:depression/anxiety             GI/Hepatic/Renal:   (+) morbid obesity         ROS comment: Bowel perforation. Endo/Other:                     Abdominal:   (+) obese,         Vascular:                                        Anesthesia Plan      MAC     ASA 3       Induction: intravenous. MIPS: Postoperative opioids intended and Prophylactic antiemetics administered. Anesthetic plan and risks discussed with patient. Plan discussed with CRNA.                 INES Andrea - PAM   12/2/2020
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
## 2020-12-02 NOTE — TELEPHONE ENCOUNTER
Left message on patient's voicemail to call office to verify venous ablation time 12/18/20 @ 12:00. Ernestine Trinh

## 2020-12-03 ENCOUNTER — HOSPITAL ENCOUNTER (OUTPATIENT)
Age: 65
Setting detail: OUTPATIENT SURGERY
Discharge: HOME | End: 2020-12-03
Attending: SURGERY | Admitting: SURGERY
Payer: MEDICARE

## 2020-12-03 ENCOUNTER — ANESTHESIA (OUTPATIENT)
Dept: OPERATING ROOM | Age: 65
End: 2020-12-03
Payer: MEDICARE

## 2020-12-03 VITALS
OXYGEN SATURATION: 96 % | RESPIRATION RATE: 18 BRPM | SYSTOLIC BLOOD PRESSURE: 112 MMHG | DIASTOLIC BLOOD PRESSURE: 64 MMHG

## 2020-12-03 VITALS
HEART RATE: 56 BPM | TEMPERATURE: 97.2 F | RESPIRATION RATE: 16 BRPM | DIASTOLIC BLOOD PRESSURE: 89 MMHG | HEIGHT: 70 IN | SYSTOLIC BLOOD PRESSURE: 151 MMHG | BODY MASS INDEX: 37.22 KG/M2 | WEIGHT: 260 LBS | OXYGEN SATURATION: 97 %

## 2020-12-03 PROCEDURE — 3600000012 HC SURGERY LEVEL 2 ADDTL 15MIN: Performed by: SURGERY

## 2020-12-03 PROCEDURE — 7100000010 HC PHASE II RECOVERY - FIRST 15 MIN: Performed by: SURGERY

## 2020-12-03 PROCEDURE — 2500000003 HC RX 250 WO HCPCS: Performed by: SURGERY

## 2020-12-03 PROCEDURE — 6360000002 HC RX W HCPCS: Performed by: NURSE ANESTHETIST, CERTIFIED REGISTERED

## 2020-12-03 PROCEDURE — 2709999900 HC NON-CHARGEABLE SUPPLY: Performed by: SURGERY

## 2020-12-03 PROCEDURE — 7100000011 HC PHASE II RECOVERY - ADDTL 15 MIN: Performed by: SURGERY

## 2020-12-03 PROCEDURE — 6360000002 HC RX W HCPCS: Performed by: SURGERY

## 2020-12-03 PROCEDURE — 3700000000 HC ANESTHESIA ATTENDED CARE: Performed by: SURGERY

## 2020-12-03 PROCEDURE — 2580000003 HC RX 258: Performed by: NURSE ANESTHETIST, CERTIFIED REGISTERED

## 2020-12-03 PROCEDURE — 12032 INTMD RPR S/A/T/EXT 2.6-7.5: CPT | Performed by: SURGERY

## 2020-12-03 PROCEDURE — 88305 TISSUE EXAM BY PATHOLOGIST: CPT

## 2020-12-03 PROCEDURE — 3600000002 HC SURGERY LEVEL 2 BASE: Performed by: SURGERY

## 2020-12-03 PROCEDURE — 11403 EXC TR-EXT B9+MARG 2.1-3CM: CPT | Performed by: SURGERY

## 2020-12-03 PROCEDURE — 3700000001 HC ADD 15 MINUTES (ANESTHESIA): Performed by: SURGERY

## 2020-12-03 PROCEDURE — 2580000003 HC RX 258: Performed by: SURGERY

## 2020-12-03 RX ORDER — AMOXICILLIN 250 MG
2 CAPSULE ORAL DAILY
Qty: 28 TABLET | Refills: 0 | Status: SHIPPED | OUTPATIENT
Start: 2020-12-03 | End: 2020-12-17

## 2020-12-03 RX ORDER — ONDANSETRON 4 MG/1
4 TABLET, FILM COATED ORAL DAILY PRN
Qty: 20 TABLET | Refills: 0 | Status: SHIPPED | OUTPATIENT
Start: 2020-12-03 | End: 2021-04-27

## 2020-12-03 RX ORDER — PROPOFOL 10 MG/ML
INJECTION, EMULSION INTRAVENOUS PRN
Status: DISCONTINUED | OUTPATIENT
Start: 2020-12-03 | End: 2020-12-03 | Stop reason: SDUPTHER

## 2020-12-03 RX ORDER — LIDOCAINE HYDROCHLORIDE 20 MG/ML
INJECTION, SOLUTION INTRAVENOUS PRN
Status: DISCONTINUED | OUTPATIENT
Start: 2020-12-03 | End: 2020-12-03 | Stop reason: SDUPTHER

## 2020-12-03 RX ORDER — HYDROCODONE BITARTRATE AND ACETAMINOPHEN 5; 325 MG/1; MG/1
1 TABLET ORAL EVERY 6 HOURS PRN
Qty: 7 TABLET | Refills: 0 | Status: SHIPPED | OUTPATIENT
Start: 2020-12-03 | End: 2020-12-06

## 2020-12-03 RX ORDER — LIDOCAINE HYDROCHLORIDE 10 MG/ML
INJECTION, SOLUTION EPIDURAL; INFILTRATION; INTRACAUDAL; PERINEURAL
Status: COMPLETED | OUTPATIENT
Start: 2020-12-03 | End: 2020-12-03

## 2020-12-03 RX ORDER — ONDANSETRON 2 MG/ML
4 INJECTION INTRAMUSCULAR; INTRAVENOUS ONCE
Status: COMPLETED | OUTPATIENT
Start: 2020-12-03 | End: 2020-12-03

## 2020-12-03 RX ORDER — SODIUM CHLORIDE, SODIUM LACTATE, POTASSIUM CHLORIDE, CALCIUM CHLORIDE 600; 310; 30; 20 MG/100ML; MG/100ML; MG/100ML; MG/100ML
INJECTION, SOLUTION INTRAVENOUS ONCE
Status: COMPLETED | OUTPATIENT
Start: 2020-12-03 | End: 2020-12-03

## 2020-12-03 RX ORDER — SODIUM CHLORIDE, SODIUM LACTATE, POTASSIUM CHLORIDE, CALCIUM CHLORIDE 600; 310; 30; 20 MG/100ML; MG/100ML; MG/100ML; MG/100ML
INJECTION, SOLUTION INTRAVENOUS CONTINUOUS PRN
Status: DISCONTINUED | OUTPATIENT
Start: 2020-12-03 | End: 2020-12-03 | Stop reason: SDUPTHER

## 2020-12-03 RX ADMIN — PROPOFOL 50 MG: 10 INJECTION, EMULSION INTRAVENOUS at 12:23

## 2020-12-03 RX ADMIN — SODIUM CHLORIDE, POTASSIUM CHLORIDE, SODIUM LACTATE AND CALCIUM CHLORIDE: 600; 310; 30; 20 INJECTION, SOLUTION INTRAVENOUS at 10:24

## 2020-12-03 RX ADMIN — PROPOFOL 50 MG: 10 INJECTION, EMULSION INTRAVENOUS at 12:29

## 2020-12-03 RX ADMIN — PROPOFOL 50 MG: 10 INJECTION, EMULSION INTRAVENOUS at 12:43

## 2020-12-03 RX ADMIN — PROPOFOL 50 MG: 10 INJECTION, EMULSION INTRAVENOUS at 12:27

## 2020-12-03 RX ADMIN — PROPOFOL 50 MG: 10 INJECTION, EMULSION INTRAVENOUS at 12:45

## 2020-12-03 RX ADMIN — ONDANSETRON 4 MG: 2 INJECTION INTRAMUSCULAR; INTRAVENOUS at 10:33

## 2020-12-03 RX ADMIN — LIDOCAINE HYDROCHLORIDE 100 MG: 20 INJECTION, SOLUTION INTRAVENOUS at 12:20

## 2020-12-03 RX ADMIN — PROPOFOL 50 MG: 10 INJECTION, EMULSION INTRAVENOUS at 12:47

## 2020-12-03 RX ADMIN — CEFAZOLIN SODIUM 2 G: 10 INJECTION, POWDER, FOR SOLUTION INTRAVENOUS at 12:23

## 2020-12-03 RX ADMIN — PROPOFOL 50 MG: 10 INJECTION, EMULSION INTRAVENOUS at 12:20

## 2020-12-03 RX ADMIN — PROPOFOL 50 MG: 10 INJECTION, EMULSION INTRAVENOUS at 12:52

## 2020-12-03 RX ADMIN — PROPOFOL 50 MG: 10 INJECTION, EMULSION INTRAVENOUS at 12:25

## 2020-12-03 RX ADMIN — PROPOFOL 50 MG: 10 INJECTION, EMULSION INTRAVENOUS at 12:36

## 2020-12-03 RX ADMIN — PROPOFOL 50 MG: 10 INJECTION, EMULSION INTRAVENOUS at 12:39

## 2020-12-03 RX ADMIN — PROPOFOL 50 MG: 10 INJECTION, EMULSION INTRAVENOUS at 12:33

## 2020-12-03 RX ADMIN — SODIUM CHLORIDE, POTASSIUM CHLORIDE, SODIUM LACTATE AND CALCIUM CHLORIDE: 600; 310; 30; 20 INJECTION, SOLUTION INTRAVENOUS at 12:05

## 2020-12-03 RX ADMIN — PROPOFOL 50 MG: 10 INJECTION, EMULSION INTRAVENOUS at 12:21

## 2020-12-03 ASSESSMENT — PULMONARY FUNCTION TESTS
PIF_VALUE: 0

## 2020-12-03 ASSESSMENT — PAIN SCALES - GENERAL
PAINLEVEL_OUTOF10: 0

## 2020-12-03 ASSESSMENT — PAIN - FUNCTIONAL ASSESSMENT: PAIN_FUNCTIONAL_ASSESSMENT: 0-10

## 2020-12-03 ASSESSMENT — ENCOUNTER SYMPTOMS: COLOR CHANGE: 1

## 2020-12-03 NOTE — OP NOTE
[facility]  [birth year]  12/3/20      Pre-operative Diagnosis:  Right lower quadrant abdominal skin lesion    Post-operative Diagnosis:  Same    Procedure:   1. Excision of right lower quadrant abdominal skin lesion measuring 3 x 2.5 cm    2. Two layer closure excision site    Surgeon: Boubacar Delacruz II    Assistant: MALACHI Hurt The skilled assistance of the CNP was necessary for the successful completion of this case. She was essential for the proper positioning, manipulation of instruments, proper exposure, manipulation of tissue, and wound closure. Anesthesia:  MAC    ASA Class: 3    Findings: Consistent with post-operative diagnosis    IVF:  450 mL    Estimated Blood Loss:  2 mL                  Specimens: right lower abdominal skin lesion             Complications:  None; patient tolerated the procedure well. Disposition: PACU - hemodynamically stable. Condition: stable      The patient was seen again in the pre-operative area. Informed consent was obtained. Again, the risks, benefits, complications, treatment options, and expected outcomes were discussed with the patient. There was concurrence with the proposed plan. The site of surgery was properly noted/marked. The patient was transported to the operating room. Once in the operating room, the patient was transferred to the operating room table and placed in the supine position. The patient was secured to the operating room table with multiple straps and all pressue points were well-padded. Bilateral sequential compression devices were applied and were working throughout the duration of the case. MAC was started without complications or changes in the patient's vital signs. Appropriate antibiotics were given in accordance with national protocols. The patient's abdomen was prepped and draped in standard sterile fashion.      A time-out was held with all members of the operating room team present and in agreement. 1% lidocaine was infiltrated at the surgical site and an elliptical incision was made to remove the skin lesion with surrounding normal tissue. The specimen measured 3 x 2.5 cm. Hemostasis was acquired using Bovie cautery. The wound was irrigated and hemostatis was once again checked and found to be appropriate. The incision was closed in two layers using 3-0 Vicryl for the deep dermal tissue and 4-0 Monocryl for the skin. A surgical dressing was applied. At the end of the case, the patient was extubated and transported to the recovery room in stable condition. At the end of the case, the sponge, instrument, and needle counts were correct times two. Dr. Lb Ramsey was present, scrubbed, and supervised the entire duration of the surgery.       Shree Higginbotham MD

## 2020-12-03 NOTE — PROGRESS NOTES
0  Pt states is feeling ready to go home. VS rechecked and stable. Rt abd incision remains well-approximated with surgical glue and steri-strips in place and no signs of drainage noted. 1410  Pt up to get dressed in room. 26  Pt's wife here for ride home. Pt instructed for discharge care of wound and use of all Rx with understanding voiced.
Incision to right side of mid abd well approximated. Surgical glue noted with steri strips noted.
Nourishment given and tolerating well.
before or morning of your procedure, do not apply any lotion, oil or powder. 13. Wear a mask covering your nose & mouth when entering the hospital. Have your covid-19 test performed within 10 days of your                  Surgery (neg on 11/23/20). Quarantine yourself after the test until after your surgery.
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
## 2020-12-08 ENCOUNTER — TELEPHONE (OUTPATIENT)
Dept: CARDIOLOGY CLINIC | Age: 65
End: 2020-12-08

## 2020-12-08 NOTE — TELEPHONE ENCOUNTER
Patient given results in person during office visit with Dr. Ana Ryan    The Left GSV is non-compressible with no evidence of flow just past the saphenofemoral junction to the distal thigh. Left CFV is patent with good compressibility and respirophasic signal with good augmentation.

## 2020-12-10 ENCOUNTER — VIRTUAL VISIT (OUTPATIENT)
Dept: BARIATRICS/WEIGHT MGMT | Age: 65
End: 2020-12-10

## 2020-12-10 PROCEDURE — 99024 POSTOP FOLLOW-UP VISIT: CPT | Performed by: SURGERY

## 2020-12-11 ENCOUNTER — TELEPHONE (OUTPATIENT)
Dept: PHARMACY | Age: 65
End: 2020-12-11

## 2020-12-11 NOTE — TELEPHONE ENCOUNTER
SCheduled for 12/21    CLINICAL PHARMACY CONSULT: MED RECONCILIATION/REVIEW ADDENDUM    For Pharmacy Admin Tracking Only    PHSO: Yes  Total # of Interventions Recommended: 0    Total Interventions Accepted:   Time Spent (min): 5    Matteo Son, JanessaD

## 2020-12-18 ENCOUNTER — PROCEDURE VISIT (OUTPATIENT)
Dept: CARDIOLOGY CLINIC | Age: 65
End: 2020-12-18
Payer: MEDICARE

## 2020-12-18 ENCOUNTER — NURSE ONLY (OUTPATIENT)
Dept: CARDIOLOGY CLINIC | Age: 65
End: 2020-12-18
Payer: MEDICARE

## 2020-12-18 VITALS — SYSTOLIC BLOOD PRESSURE: 130 MMHG | DIASTOLIC BLOOD PRESSURE: 82 MMHG | HEART RATE: 70 BPM

## 2020-12-18 PROCEDURE — 99211 OFF/OP EST MAY X REQ PHY/QHP: CPT | Performed by: INTERNAL MEDICINE

## 2020-12-18 PROCEDURE — 36482 ENDOVEN THER CHEM ADHES 1ST: CPT | Performed by: INTERNAL MEDICINE

## 2020-12-18 NOTE — PROGRESS NOTES
Endovenous Ablation with VenaSeal Operative Report    12/18/2020    Subjective:  Tirso Byrd is a 76 y.o. male    Procedure Performed:    Endovenous Ablation of the Great Saphenous Vein with VenaSeal Closure System of the  Right side. Indication  Duplex ultrasound was used to map out the insufficient saphenous vein, and access was determined and marked on the overlying skin. The depth and diameter of the vein(s) to be treated was documented. The patient was placed supine on the procedure table and the leg was prepped and draped using sterile technique. Ultrasound guidance was again used to localize the access site. 1% lidocaine was injected as a local anesthetic in the subcutaneous tissues at the target location in the GSV in the lower leg. Using ultrasound guidance, access was gained at this location with the 19 gauge thin walled access needle and followed by introduction of a short guidewire, location confirmed with ultrasound. A small, 3 mm incision was made at the access site to allow for introduction and placement of the 7 Fr x7cm introducer/dilator. The dilator and guidewire were removed. The 0.035 guidewire from the DR [facility] kit was then introduced and positioned at the saphenofemoral junction using ultrasound guidance. The 80 cm 7 Fr introducer sheath/dilator was positioned 5cm from the saphenofemoral junction. The guidewire and dilator were removed, and the remaining sheath was flushed with sterile saline, with the syringe remaining in place prior to the next steps. The cyanoacrylate adhesive was precisely primed into the 5 F delivery catheter and this catheter/syringe combination was attached within the dispenser gun. This \"assembly\" was introduced through the 7 F sheath and positioned 5 cm caudal of the saphenofemoral junction under ultrasound guidance. The steps from the IFU were followed for dispensing amounts, locations and compression times, e.g 2 aliquots proximally with 3 minutes of compression, and 1 aliquot every 3cm distally with 30 sec of compression along the course of the vessel. Following the last injection and compression sequence, the catheter and introducer sheath were pulled out from the access site. Hemostasis was achieved with manual compression and an adhesive bandage was applied to the incision. Ultrasound confirmed complete coaptation and closure of the treated segments of the GSV, and the absence of any DVT at the saphenofemoral junction. Treatment dose was approximately 1.7 ml and the vein length treated was 46 cm. The drapes were removed and the patient cleaned and prepared for discharge. Post op ultrasound check is scheduled for   48- 72 hours and the patient was given written post-op instructions. Complications: none immediate. Blood Loss: minimal    Conclusion:   Successful Endovenous Ablation of the Great Saphenous Vein with VenaSeal Closure System of the  Right side.       Phoebe Lunsford MD, [facility] [place]

## 2020-12-18 NOTE — PROGRESS NOTES
VENOUS PRE-PROCEDURE H & P  12/18/2020    Subjective:  William Rodriguez is a 76 y.o. male    GENERAL - A-Ox3- In no respiratory distress  Heart - Regular rhythm, normal S1, S2, no gallops, no murmurs, no friction rubs  Chest - CTA & percussion    Vein Exam:     Right ext - varicosities, spider and reticular veins Yes, skin changes Yes, ulcers No, edema Yes    Left ext  - varicosities, spider and reticular veins Yes, skin changes Yes, ulcers No, edema Yes    Reflex Study:    Significant reflux noted of the Right Deep Venous System: CFV (2.3s).  Significant reflux noted of the Right GSV at the Distal Calf (0.9s) and the    SSV in the distal portion (2.6s).      Significant reflux noted in the Left Deep Venous System: CFV (1.5s).  Significant reflux noted in the Left GSV at the SFJ (9.5s).      No evidence of DVT or SVT in the bilateral common femoral vein, femoral    vein, popliteal vein, greater saphenous vein or small saphenous vein. Assessment:   Symptomatic C4 venous disease. Plan:   Ablation of right GSV. Informed consent obtained.       Safia Vasquez MD, [address] [facility]

## 2020-12-21 ENCOUNTER — ANTI-COAG VISIT (OUTPATIENT)
Dept: PHARMACY | Age: 65
End: 2020-12-21
Payer: MEDICARE

## 2020-12-21 ENCOUNTER — OFFICE VISIT (OUTPATIENT)
Dept: CARDIOLOGY CLINIC | Age: 65
End: 2020-12-21
Payer: MEDICARE

## 2020-12-21 VITALS — TEMPERATURE: 96.6 F

## 2020-12-21 LAB
INTERNATIONAL NORMALIZATION RATIO, POC: 2.1
POC INR: 2.1 INDEX
PROTHROMBIN TIME, POC: 25.5 SECONDS (ref 10–14.3)

## 2020-12-21 PROCEDURE — 93971 EXTREMITY STUDY: CPT | Performed by: INTERNAL MEDICINE

## 2020-12-21 PROCEDURE — 85610 PROTHROMBIN TIME: CPT

## 2020-12-21 PROCEDURE — 36416 COLLJ CAPILLARY BLOOD SPEC: CPT

## 2020-12-21 PROCEDURE — 99211 OFF/OP EST MAY X REQ PHY/QHP: CPT

## 2020-12-21 NOTE — PROGRESS NOTES
Medication Management Service  [facility]  [phone]    Visit Date: 12/21/2020   Subjective:       Mel Casiano is a 76 y.o. male who presents to clinic today for anticoagulation monitoring and adjustment. Patient seen in clinic for warfarin management due to  Indication:   atrial fibrillation. INR goal: of 2.0-3.0. Duration of therapy: indefinite. Patient reports the following:   Adherent with regimen  Missed or extra doses:  None   Bleeding or thromboembolic side effects:  None  Significant medication changes:  None  Significant dietary changes: None  Significant alcohol or tobacco changes: None  Significant recent illness, disease state changes, or hospitalization:  None  Upcoming surgeries or procedures:  None  Falls: None           Assessment and Plan     PT/INR done in office per protocol. INR today is 2.1, therapeutic. Plan: Will continue current regimen of warfarin 6mg alternating with 7.5mg every other day. .     Recheck INR in 5 week(s). Patient verbalized understanding of dosing directions and information discussed. Dosing schedule given to patient including phone number, appointment date, and time. Progress note sent to referring office. Patient acknowledges working in consult agreement with pharmacist as referred by his/her physician.       Electronically signed by YEIMY [facility] on 12/21/20 at 2:30 PM EST    CLINICAL PHARMACY CONSULT: MED RECONCILIATION/REVIEW ADDENDUM    For Pharmacy Admin Tracking Only    PHSO: Yes  Total # of Interventions Recommended: 0    - Maintenance Safety Lab Monitoring #: 1    Total Interventions Accepted: 1  Time Spent (min): 15    Marsha Bolanos PharmD

## 2020-12-29 ENCOUNTER — TELEPHONE (OUTPATIENT)
Dept: CARDIOLOGY CLINIC | Age: 65
End: 2020-12-29

## 2020-12-29 NOTE — TELEPHONE ENCOUNTER
Advised patient of results. Patient voiced understanding. Right CFV is patent with good compressibility and respirophasic signal with good augmentation. The Right GSV is non-compressible with no evidence of flow just past the mid thigh to the distal calf. Patient called this morning stating that his leg was swollen and tender. Was advised to use warm compress and motrin/tylenol, keep leg elevated. Advised patient of our holiday hours the rest of the week and if symptoms don't get better or they continue to get worse to call us for an appointment. Patient states he lives 4 hours away right now but he will call if he has anymore issues.

## 2021-01-15 PROCEDURE — G2066 INTER DEVC REMOTE 30D: HCPCS | Performed by: INTERNAL MEDICINE

## 2021-01-15 PROCEDURE — 93298 REM INTERROG DEV EVAL SCRMS: CPT | Performed by: INTERNAL MEDICINE

## 2021-01-19 ENCOUNTER — TELEPHONE (OUTPATIENT)
Dept: CARDIOLOGY CLINIC | Age: 66
End: 2021-01-19

## 2021-01-19 NOTE — TELEPHONE ENCOUNTER
Discussed leg pain with patient . He states it is much improved. Patient stated he thought he was to have an ablation of RSSV on Friday. Schedule shows patient is to have follow up appointment with Dr. Bunny Benitez and follow up US. Patient states he will be here.

## 2021-01-21 ENCOUNTER — TELEPHONE (OUTPATIENT)
Dept: PHARMACY | Age: 66
End: 2021-01-21

## 2021-01-21 NOTE — TELEPHONE ENCOUNTER
Patient left voicemail to cancel 1/25 appointment. Left patient voicemail to call clinic to schedule.     CLINICAL PHARMACY CONSULT: MED RECONCILIATION/REVIEW ADDENDUM    For Pharmacy Admin Tracking Only    PHSO: YesTime Spent (min): 5    Janessa BradshawD

## 2021-01-25 ENCOUNTER — TELEPHONE (OUTPATIENT)
Dept: CARDIOLOGY CLINIC | Age: 66
End: 2021-01-25

## 2021-01-25 ENCOUNTER — TELEPHONE (OUTPATIENT)
Dept: PHARMACY | Age: 66
End: 2021-01-25

## 2021-01-25 DIAGNOSIS — I48.91 ATRIAL FIBRILLATION, UNSPECIFIED TYPE (HCC): ICD-10-CM

## 2021-01-25 LAB
INR BLD: 1.71 (ref 0.86–1.14)
PROTHROMBIN TIME: 20 SEC (ref 10–13.2)

## 2021-01-25 NOTE — TELEPHONE ENCOUNTER
Patient will go to [address] [place] [address] lab today when in town.      CLINICAL PHARMACY CONSULT: MED RECONCILIATION/REVIEW ADDENDUM    For Pharmacy Admin Tracking Only    PHSO: YesTime Spent (min): 5    Janessa ConleyD

## 2021-01-26 ENCOUNTER — PROCEDURE VISIT (OUTPATIENT)
Dept: CARDIOLOGY CLINIC | Age: 66
End: 2021-01-26
Payer: MEDICARE

## 2021-01-26 ENCOUNTER — ANTI-COAG VISIT (OUTPATIENT)
Dept: PHARMACY | Age: 66
End: 2021-01-26
Payer: MEDICARE

## 2021-01-26 DIAGNOSIS — I10 ESSENTIAL HYPERTENSION: ICD-10-CM

## 2021-01-26 DIAGNOSIS — Z79.01 ANTICOAGULATED ON COUMADIN: ICD-10-CM

## 2021-01-26 DIAGNOSIS — I48.91 ATRIAL FIBRILLATION, UNSPECIFIED TYPE (HCC): ICD-10-CM

## 2021-01-26 DIAGNOSIS — I83.893 VARICOSE VEINS OF BOTH LEGS WITH EDEMA: Primary | ICD-10-CM

## 2021-01-26 PROCEDURE — 36482 ENDOVEN THER CHEM ADHES 1ST: CPT | Performed by: INTERNAL MEDICINE

## 2021-01-26 PROCEDURE — 99212 OFFICE O/P EST SF 10 MIN: CPT

## 2021-01-26 NOTE — PROGRESS NOTES
VENOUS PRE-PROCEDURE H & P  1/26/2021    Subjective:  Leobardo Kulkarni is a 76 y.o. male    GENERAL - A-Ox3- In no respiratory distress  Heart - Regular rhythm, normal S1, S2, no gallops, no murmurs, no friction rubs  Chest - CTA & percussion    Vein Exam:     Right ext - varicosities, spider and reticular veins Yes, skin changes Yes, ulcers No, edema Yes    Left ext  - varicosities, spider and reticular veins Yes, skin changes Yes, ulcers No, edema Yes    Reflex Study:    Significant reflux noted of the Right Deep Venous System: CFV (2.3s).  Significant reflux noted of the Right GSV at the Distal Calf (0.9s) and the    SSV in the distal portion (2.6s).      Significant reflux noted in the Left Deep Venous System: CFV (1.5s).  Significant reflux noted in the Left GSV at the SFJ (9.5s).      No evidence of DVT or SVT in the bilateral common femoral vein, femoral    vein, popliteal vein, greater saphenous vein or small saphenous vein. Assessment:   Patient has known C4 venous disease. Had ablation of both GSVs.    Plan:   Ablation of right SSV. Informed consent obtained.       Jeanette Caro MD, [address] [facility]

## 2021-01-26 NOTE — PROGRESS NOTES
Endovenous Ablation with VenaSeal Operative Report    1/26/2021    Subjective:  Silverio Key is a 76 y.o. male    Procedure Performed:    Endovenous Ablation of the Small Saphenous Vein with VenaSeal Closure System of the  Right side. Indication  Duplex ultrasound was used to map out the insufficient saphenous vein, and access was determined and marked on the overlying skin. The depth and diameter of the vein(s) to be treated was documented. The patient was placed supine on the procedure table and the leg was prepped and draped using sterile technique. Ultrasound guidance was again used to localize the access site. 1% lidocaine was injected as a local anesthetic in the subcutaneous tissues at the target location in the GSV in the lower leg. Using ultrasound guidance, access was gained at this location with the 19 gauge thin walled access needle and followed by introduction of a short guidewire, location confirmed with ultrasound. A small, 3 mm incision was made at the access site to allow for introduction and placement of the 7 Fr x7cm introducer/dilator. The dilator and guidewire were removed. The 0.035 guidewire from the DR [facility]NSON [facility] kit was then introduced and positioned at the saphenofemoral junction using ultrasound guidance. The 80 cm 7 Fr introducer sheath/dilator was positioned 5cm from the saphenofemoral junction. The guidewire and dilator were removed, and the remaining sheath was flushed with sterile saline, with the syringe remaining in place prior to the next steps. The cyanoacrylate adhesive was precisely primed into the 5 F delivery catheter and this catheter/syringe combination was attached within the dispenser gun. This \"assembly\" was introduced through the 7 F sheath and positioned 5 cm caudal of the saphenofemoral junction under ultrasound guidance. The steps from the IFU were followed for dispensing amounts, locations and compression times, e.g 2 aliquots proximally with 3 minutes of compression, and 1 aliquot every 3cm distally with 30 sec of compression along the course of the vessel. Following the last injection and compression sequence, the catheter and introducer sheath were pulled out from the access site. Hemostasis was achieved with manual compression and an adhesive bandage was applied to the incision. Ultrasound confirmed complete coaptation and closure of the treated segments of the GSV, and the absence of any DVT at the saphenofemoral junction. Treatment dose was approximately 1.1 ml and the vein length treated was 28 cm. The drapes were removed and the patient cleaned and prepared for discharge. Post op ultrasound check is scheduled for   48- 72 hours and the patient was given written post-op instructions. Complications: none immediate    Blood Loss: minimal    Conclusion:   SuccessfulEndovenous Ablation of the Small Saphenous Vein with VenaSeal Closure System of the Right side.     Akbar Hawkins MD, [facility] [place]

## 2021-01-26 NOTE — PROGRESS NOTES
Medication Management Service  [address] [phone]  Luiz Cannon [phone]    Visit Date: 1/26/2021   Subjective: All appointments have been changed to telephone visits at this time due to COVID-19. Mari Jc is a 76 y.o. male who is having INR checked by [employer]. INR results were sent to the clinic for anticoagulation monitoring and adjustment. Patient results called in to clinic for warfarin management due to  Indication:   atrial fibrillation. INR goal: of 2.0-3.0. Duration of therapy: indefinite. Patient reports the following:   Adherent with regimen  Missed or extra doses:  None   Bleeding or thromboembolic side effects:  None  Significant medication changes:  None  Significant dietary changes: None  Significant alcohol or tobacco changes: None  Significant recent illness, disease state changes, or hospitalization:  None  Upcoming surgeries or procedures:  None  Falls: None           Assessment and Plan     PT/INR performed by Lab. INR yesterday was 1.71, subtherapeutic. Plan:  Take warfarin 9mg x 1 then will continue current regimen of warfarin 7.5mg then 6mg repeating. Recheck INR in 2 week(s). Patient states he is unable to return to [facility] until March 1st. He is going to try to see if he can have his INR checked closer to home in the meantime. Patient has standing lab orders for PT/INR. Patient verbalized understanding of dosing directions and information discussed. Progress note sent to referring office. Patient acknowledges working in consult agreement with pharmacist as referred by his/her physician. Patient accepted that for purposes of billing, this is a virtual visit with your provider for which we will submit a claim for reimbursement with your insurance company. You may be responsible for any copays, coinsurance amounts or other amounts not covered by your insurance company. Electronically signed by Pranay Cline, [facility] on 1/26/21 at 11:55 AM EST    CLINICAL PHARMACY CONSULT: MED RECONCILIATION/REVIEW ADDENDUM    For Pharmacy Admin Tracking Only    PHSO: Yes  Total # of Interventions Recommended: 1  - Increased Dose #: 1  - Maintenance Safety Lab Monitoring #: 1  Total Interventions Accepted: 1  Time Spent (min): 15    Pranay Cline, PharmD

## 2021-01-27 ENCOUNTER — OFFICE VISIT (OUTPATIENT)
Dept: CARDIOLOGY CLINIC | Age: 66
End: 2021-01-27
Payer: MEDICARE

## 2021-01-27 ENCOUNTER — PROCEDURE VISIT (OUTPATIENT)
Dept: CARDIOLOGY CLINIC | Age: 66
End: 2021-01-27
Payer: MEDICARE

## 2021-01-27 VITALS
DIASTOLIC BLOOD PRESSURE: 80 MMHG | HEART RATE: 60 BPM | BODY MASS INDEX: 39.54 KG/M2 | WEIGHT: 276.2 LBS | SYSTOLIC BLOOD PRESSURE: 110 MMHG | HEIGHT: 70 IN

## 2021-01-27 DIAGNOSIS — I10 ESSENTIAL HYPERTENSION: ICD-10-CM

## 2021-01-27 DIAGNOSIS — I47.1 AVNRT (AV NODAL RE-ENTRY TACHYCARDIA) (HCC): ICD-10-CM

## 2021-01-27 DIAGNOSIS — Z79.01 CHRONIC ANTICOAGULATION: ICD-10-CM

## 2021-01-27 DIAGNOSIS — I83.893 VARICOSE VEINS OF BOTH LEGS WITH EDEMA: Primary | ICD-10-CM

## 2021-01-27 DIAGNOSIS — I48.91 ATRIAL FIBRILLATION, UNSPECIFIED TYPE (HCC): ICD-10-CM

## 2021-01-27 DIAGNOSIS — I87.2 VENOUS INSUFFICIENCY: Primary | ICD-10-CM

## 2021-01-27 DIAGNOSIS — G45.9 TIA (TRANSIENT ISCHEMIC ATTACK): ICD-10-CM

## 2021-01-27 PROCEDURE — 93971 EXTREMITY STUDY: CPT | Performed by: INTERNAL MEDICINE

## 2021-01-27 PROCEDURE — 1036F TOBACCO NON-USER: CPT | Performed by: INTERNAL MEDICINE

## 2021-01-27 PROCEDURE — 99214 OFFICE O/P EST MOD 30 MIN: CPT | Performed by: INTERNAL MEDICINE

## 2021-01-27 PROCEDURE — 4040F PNEUMOC VAC/ADMIN/RCVD: CPT | Performed by: INTERNAL MEDICINE

## 2021-01-27 PROCEDURE — G8427 DOCREV CUR MEDS BY ELIG CLIN: HCPCS | Performed by: INTERNAL MEDICINE

## 2021-01-27 PROCEDURE — G8417 CALC BMI ABV UP PARAM F/U: HCPCS | Performed by: INTERNAL MEDICINE

## 2021-01-27 PROCEDURE — 3017F COLORECTAL CA SCREEN DOC REV: CPT | Performed by: INTERNAL MEDICINE

## 2021-01-27 PROCEDURE — 1123F ACP DISCUSS/DSCN MKR DOCD: CPT | Performed by: INTERNAL MEDICINE

## 2021-01-27 PROCEDURE — G8484 FLU IMMUNIZE NO ADMIN: HCPCS | Performed by: INTERNAL MEDICINE

## 2021-01-27 NOTE — PROGRESS NOTES
Arva Sacks is a 76 y.o. male who has    CHIEF COMPLAINT AS FOLLOWS:  CHEST PAIN: Patient denies any C/O chest pains at this time. SOB: No C/O SOB at this time. LEG EDEMA: Right leg edema   PALPITATIONS: Denies any C/O Palpitations   DIZZINESS: No C/O Dizziness   SYNCOPE: None   OTHER:                                     HPI: Patient is here for F/U on his CVI, A-fib Arrhythmia & HTN problems. CCVI: Patient has known Hx of  CVI. Had multiple ablations in the past.  Arrhythmia: Patient has known Hx of atrial fibrillation. HTN: Patient has known Hx of essential HTN. Has been treated with guideline recommended medical / physical/ diet therapy as stated below. He does not have any new complaints at this time. Current Outpatient Medications   Medication Sig Dispense Refill    ondansetron (ZOFRAN) 4 MG tablet Take 1 tablet by mouth daily as needed for Nausea or Vomiting 20 tablet 0    warfarin (COUMADIN) 7.5 MG tablet Take 1 tablet by mouth every other day Alternating with 6mg every other day 45 tablet 1    sotalol (BETAPACE) 80 MG tablet Take 1 tablet by mouth 2 times daily 60 tablet 5    Omega-3 Fatty Acids (FISH OIL PO) Take by mouth      warfarin (COUMADIN) 6 MG tablet TAKE 1 TABLET BY MOUTH EVERY OTHER DAY 90 tablet 1    magnesium oxide (MAG-OX) 400 (241.3 Mg) MG TABS tablet Take 0.5 tablets by mouth daily (Patient taking differently: Take 400 mg by mouth daily ) 30 tablet 0     No current facility-administered medications for this visit.       Allergies: Aspirin  Review of Systems:    Constitutional: Negative for diaphoresis and fatigue  Respiratory: Negative for shortness of breath  Cardiovascular: Negative for chest pain, dyspnea on exertion, claudication, edema, irregular heartbeat, murmur, palpitations or shortness of breath Musculoskeletal: Negative for muscle pain, muscular weakness, negative for pain in arm and leg or swelling in foot and leg    Objective:  /80   Pulse 60   Ht 5' 10\" (1.778 m)   Wt 276 lb 3.2 oz (125.3 kg)   BMI 39.63 kg/m²   Wt Readings from Last 3 Encounters:   01/27/21 276 lb 3.2 oz (125.3 kg)   12/03/20 260 lb (117.9 kg)   12/02/20 266 lb (120.7 kg)     Body mass index is 39.63 kg/m². GENERAL - Alert, oriented, pleasant, in no apparent distress. EYES: No jaundice, no conjunctival pallor. Neck - Supple. No jugular venous distention noted. No carotid bruits. Cardiovascular  Normal S1 and S2 without obvious murmur or gallop. Extremities - No cyanosis, clubbing, or significant edema. Pulmonary  No respiratory distress. No wheezes or rales.       Lab Review   No results found for: TROPONINT  No results found for: BNP, PROBNP  Lab Results   Component Value Date    INR 1.71 (H) 01/25/2021    INR 2.1 12/21/2020    INR 2.1 12/21/2020     No results found for: LABA1C  Lab Results   Component Value Date    WBC 8.4 12/02/2020    WBC 6.6 09/23/2020    HCT 50.2 12/02/2020    HCT 51.0 09/24/2020    MCV 93.8 12/02/2020    MCV 93.6 09/23/2020     12/02/2020     09/23/2020     No results found for: CHOL, TRIG, HDL, LDLCALC, LDLDIRECT, CHOLHDLRATIO  Lab Results   Component Value Date    ALT 29 08/07/2020    ALT 24 08/06/2020    AST 20 08/07/2020    AST 16 08/06/2020     BMP:    Lab Results   Component Value Date     12/02/2020     09/24/2020    K 4.0 12/02/2020    K 4.0 09/24/2020     12/02/2020     09/23/2020    CO2 22 12/02/2020    CO2 29 09/24/2020    BUN 9 12/02/2020    BUN 11 09/23/2020    CREATININE 0.7 12/02/2020    CREATININE 0.9 09/24/2020    CREATININE 0.8 09/23/2020     CMP:   Lab Results   Component Value Date     12/02/2020     09/24/2020    K 4.0 12/02/2020    K 4.0 09/24/2020     12/02/2020     09/23/2020    CO2 22 12/02/2020 CO2 29 09/24/2020    BUN 9 12/02/2020    BUN 11 09/23/2020    CREATININE 0.7 12/02/2020    CREATININE 0.9 09/24/2020    CREATININE 0.8 09/23/2020    PROT 6.2 08/07/2020    PROT 7.5 08/06/2020     No results found for: TSH, TSHHS      QUALITY MEASURES REVIEWED:  1.CAD:Patient is taking anti platelet agent:No  Patient does not have Hx of documented CAD  2. DYSLIPIDEMIA: Patient is on cholesterol lowering medication:No   3. Beta-Blocker therapy for CAD, if prior Myocardial Infarction:Yes   4. Counselled regarding smoking cessation. No   Patient does not Smoke. 5.Anticoagulation therapy (for A.Fib) Yes  6. Discussed weight management strategies. Assessment & Plan:    Primary / Secondary prevention is the goal by aggressive risk modification, healthy and therapeutic life style changes for cardiovascular risk reduction. Various goals are discussed and multiple questions answered. CAD:None known  HTN:well controlled on current medical regimen, see list above. - changes in  treatment:   no   CARDIOMYOPATHY: None known   CONGESTIVE HEART FAILURE: NO KNOWN HISTORY.      VHD: No significant VHD noted  ARRHYTHMIAS:  known                                Patient has H/O Atrial fibrillation                                He is rate controlled & on anticoagulation. Patient is in NSR with the help of anti arrhythmics. OTHER RELEVANT DIAGNOSIS:as noted in patient's active problem list:  CVI: S/P Left GSV , Right GSV & SSV ablations. Continue wearing stockings as there is residual deep vein reflux bilaterally. .  TESTS ORDERED: none this visit. All previously ordered tests reviewed. MEDICATIONS: CPM   Office f/u with  per schedule.

## 2021-01-27 NOTE — PATIENT INSTRUCTIONS
CAD:None known  HTN:well controlled on current medical regimen, see list above. - changes in  treatment:   no   CARDIOMYOPATHY: None known   CONGESTIVE HEART FAILURE: NO KNOWN HISTORY.      VHD: No significant VHD noted  ARRHYTHMIAS:  known                                Patient has H/O Atrial fibrillation                                He is rate controlled & on anticoagulation. Patient is in NSR with the help of anti arrhythmics. OTHER RELEVANT DIAGNOSIS:as noted in patient's active problem list:  CVI: S/P Left GSV , Right GSV & SSV ablations. Continue wearing stockings as there is residual deep vein reflux bilaterally. .  TESTS ORDERED: none this visit. All previously ordered tests reviewed. MEDICATIONS: CPM   Office f/u with  per schedule.

## 2021-01-27 NOTE — PROGRESS NOTES
[id number] Score for Atrial Fibrillation Stroke Risk   Risk   Factors  Component Value   C CHF No 0   H HTN Yes 1   A2 Age >= 76 No,  (67 y.o.) 0   D DM No 0   S2 Prior Stroke/TIA Yes 2   V Vascular Disease No 0   A Age 74-69 Yes,  (67 y.o.) 1   Sc Sex male 0    [id number]  Score  4   Score last updated 1/27/21 9:36 AM EST    Click here for a link to the UpToDate guideline \"Atrial Fibrillation: Anticoagulation therapy to prevent embolization    Disclaimer: Risk Score calculation is dependent on accuracy of patient problem list and past encounter diagnosis.

## 2021-02-04 ENCOUNTER — PROCEDURE VISIT (OUTPATIENT)
Dept: CARDIOLOGY CLINIC | Age: 66
End: 2021-02-04
Payer: MEDICARE

## 2021-02-04 DIAGNOSIS — I48.91 ATRIAL FIBRILLATION, UNSPECIFIED TYPE (HCC): Primary | ICD-10-CM

## 2021-02-04 DIAGNOSIS — Z45.09 ENCOUNTER FOR ELECTRONIC ANALYSIS OF REVEAL EVENT RECORDER: ICD-10-CM

## 2021-02-08 ENCOUNTER — TELEPHONE (OUTPATIENT)
Dept: PHARMACY | Age: 66
End: 2021-02-08

## 2021-02-08 NOTE — TELEPHONE ENCOUNTER
Phone- [phone] [address], [address]     Lab hours 7am -5:00pm per website. Will call next clinic day to check current hours and procedure for order from outside facility. Fax orders to: [phone]  Lab hours 7:30-5:00 Mon thru Friday  Results next day     Called patient with above info and sent "[employer] message with info as requested by patient.      CLINICAL PHARMACY CONSULT: MED RECONCILIATION/REVIEW ADDENDUM    For Pharmacy Admin Tracking Only    PHSO: Yes  Total # of Interventions Recommended: 0  Total Interventions Accepted: 0  Time Spent (min): 2021 Mario Puentes, PharmD

## 2021-02-17 ENCOUNTER — TELEPHONE (OUTPATIENT)
Dept: CARDIOLOGY CLINIC | Age: 66
End: 2021-02-17

## 2021-02-17 LAB — INR BLD: 2

## 2021-02-17 NOTE — TELEPHONE ENCOUNTER
[employer] insurance contacted to check status of retro PA request fpr venous ablations. Was advised case is still pending.   Ref # [id number]  [facility] [phone]

## 2021-02-18 ENCOUNTER — ANTI-COAG VISIT (OUTPATIENT)
Dept: PHARMACY | Age: 66
End: 2021-02-18
Payer: MEDICARE

## 2021-02-18 DIAGNOSIS — I48.91 ATRIAL FIBRILLATION, UNSPECIFIED TYPE (HCC): ICD-10-CM

## 2021-02-18 DIAGNOSIS — Z79.01 ANTICOAGULATED ON COUMADIN: ICD-10-CM

## 2021-02-18 PROCEDURE — 99212 OFFICE O/P EST SF 10 MIN: CPT

## 2021-02-18 RX ORDER — WARFARIN SODIUM 7.5 MG/1
7.5 TABLET ORAL EVERY OTHER DAY
Qty: 45 TABLET | Refills: 1 | Status: SHIPPED | OUTPATIENT
Start: 2021-02-18 | End: 2021-04-27 | Stop reason: SDUPTHER

## 2021-02-18 NOTE — PROGRESS NOTES
Medication Management Service  Pankajaniket [address] [phone]  Jeanne Lopez [phone]    Visit Date: 2/18/2021   Subjective: All appointments have been changed to telephone visits at this time due to COVID-19. Belen Sheikh is a 76 y.o. male who is having INR checked by [facility]. INR results were sent to the clinic for anticoagulation monitoring and adjustment. Patient results were faxed to clinic for warfarin management due to  Indication:   atrial fibrillation. INR goal: of 2.0-3.0. Duration of therapy: indefinite. Patient reports the following:   Adherent with regimen  Missed or extra doses:  None   Bleeding or thromboembolic side effects:  None  Significant medication changes:  None  Significant dietary changes: None  Significant alcohol or tobacco changes: None  Significant recent illness, disease state changes, or hospitalization:  None  Upcoming surgeries or procedures:  None  Falls: fall today on ice           Assessment and Plan     PT/INR performed by Lab. INR 2/17/21 is 2.0, therapeutic. Patient fell on ice today while walking dog and hit head second. Advised to go to ER for any head injury Patient states did not hit too hard and no vision changes, no headache, no nausea/vomiting, no loss of consciousness. Refill requested  Plan: Will continue current regimen of warfarin 6mg alternating with 7.5mg every other day. ERx sent to [address] [place] [address]. Recheck INR in 4 week(s). Patient will go to lab at [facility]. Patient not aware of any upcoming appointments in [facility]. Patient has standing lab orders for PT/INR. Patient verbalized understanding of dosing directions and information discussed. Progress note sent to referring office. Patient acknowledges working in consult agreement with pharmacist as referred by his/her physician. Patient accepted that for purposes of billing, this is a virtual visit with your provider for which we will submit a claim for reimbursement with your insurance company. You may be responsible for any copays, coinsurance amounts or other amounts not covered by your insurance company.      Electronically signed by Avtar Jaquez [facility] on 2/18/21 at 10:53 AM EST    CLINICAL PHARMACY CONSULT: MED RECONCILIATION/REVIEW ADDENDUM    For Pharmacy Admin Tracking Only    PHSO: Yes  Total # of Interventions Recommended: 0  - Refills Provided #: 1  - Maintenance Safety Lab Monitoring #: 1    Total Interventions Accepted: 0  Time Spent (min): Ivana Lopez, PharmD

## 2021-03-01 ENCOUNTER — TELEPHONE (OUTPATIENT)
Dept: CARDIOLOGY CLINIC | Age: 66
End: 2021-03-01

## 2021-03-01 ENCOUNTER — OFFICE VISIT (OUTPATIENT)
Dept: CARDIOLOGY CLINIC | Age: 66
End: 2021-03-01
Payer: MEDICARE

## 2021-03-01 ENCOUNTER — PROCEDURE VISIT (OUTPATIENT)
Dept: CARDIOLOGY CLINIC | Age: 66
End: 2021-03-01
Payer: MEDICARE

## 2021-03-01 VITALS
SYSTOLIC BLOOD PRESSURE: 118 MMHG | BODY MASS INDEX: 37.91 KG/M2 | WEIGHT: 264.8 LBS | DIASTOLIC BLOOD PRESSURE: 72 MMHG | HEART RATE: 53 BPM | HEIGHT: 70 IN

## 2021-03-01 DIAGNOSIS — I48.91 ATRIAL FIBRILLATION, UNSPECIFIED TYPE (HCC): ICD-10-CM

## 2021-03-01 DIAGNOSIS — G47.30 SLEEP APNEA, UNSPECIFIED TYPE: ICD-10-CM

## 2021-03-01 DIAGNOSIS — M79.89 LEG SWELLING: ICD-10-CM

## 2021-03-01 DIAGNOSIS — I47.1 AVNRT (AV NODAL RE-ENTRY TACHYCARDIA) (HCC): ICD-10-CM

## 2021-03-01 DIAGNOSIS — Z79.01 CHRONIC ANTICOAGULATION: ICD-10-CM

## 2021-03-01 DIAGNOSIS — I10 ESSENTIAL HYPERTENSION: ICD-10-CM

## 2021-03-01 DIAGNOSIS — I83.893 VARICOSE VEINS OF BOTH LEGS WITH EDEMA: Primary | ICD-10-CM

## 2021-03-01 PROCEDURE — 1036F TOBACCO NON-USER: CPT | Performed by: INTERNAL MEDICINE

## 2021-03-01 PROCEDURE — G8417 CALC BMI ABV UP PARAM F/U: HCPCS | Performed by: INTERNAL MEDICINE

## 2021-03-01 PROCEDURE — 93298 REM INTERROG DEV EVAL SCRMS: CPT | Performed by: INTERNAL MEDICINE

## 2021-03-01 PROCEDURE — 93971 EXTREMITY STUDY: CPT | Performed by: INTERNAL MEDICINE

## 2021-03-01 PROCEDURE — G8484 FLU IMMUNIZE NO ADMIN: HCPCS | Performed by: INTERNAL MEDICINE

## 2021-03-01 PROCEDURE — G8427 DOCREV CUR MEDS BY ELIG CLIN: HCPCS | Performed by: INTERNAL MEDICINE

## 2021-03-01 PROCEDURE — 4040F PNEUMOC VAC/ADMIN/RCVD: CPT | Performed by: INTERNAL MEDICINE

## 2021-03-01 PROCEDURE — G2066 INTER DEVC REMOTE 30D: HCPCS | Performed by: INTERNAL MEDICINE

## 2021-03-01 PROCEDURE — 3017F COLORECTAL CA SCREEN DOC REV: CPT | Performed by: INTERNAL MEDICINE

## 2021-03-01 PROCEDURE — 1123F ACP DISCUSS/DSCN MKR DOCD: CPT | Performed by: INTERNAL MEDICINE

## 2021-03-01 PROCEDURE — 99214 OFFICE O/P EST MOD 30 MIN: CPT | Performed by: INTERNAL MEDICINE

## 2021-03-01 NOTE — PROGRESS NOTES
[id number] Score for Atrial Fibrillation Stroke Risk   Risk   Factors  Component Value   C CHF No 0   H HTN Yes 1   A2 Age >= 76 No,  (67 y.o.) 0   D DM No 0   S2 Prior Stroke/TIA Yes 2   V Vascular Disease No 0   A Age 74-69 Yes,  (67 y.o.) 1   Sc Sex male 0    [id number]  Score  4   Score last updated 3/1/21 11:49 AM EST

## 2021-03-01 NOTE — PATIENT INSTRUCTIONS
CAD:None known  HTN:well controlled on current medical regimen, see list above.            - changes in  treatment:   no   CARDIOMYOPATHY: None known   CONGESTIVE HEART FAILURE: NO KNOWN HISTORY.      VHD: No significant VHD noted  ARRHYTHMIAS: Tonya Eid has H/O Atrial fibrillation                                He is rate controlled & on anticoagulation.                                Patient is in NSR with the help of anti arrhythmics. OTHER RELEVANT DIAGNOSIS:as noted in patient's active problem list:  CVI: S/P Left GSV , Right GSV & SSV ablations. Continue wearing stockings as there is residual deep vein reflux bilaterally. .  TESTS ORDERED: none this visit.                                    All previously ordered tests reviewed.  MEDICATIONS: CPM   Office f/u with  per schedule.

## 2021-03-01 NOTE — TELEPHONE ENCOUNTER
I spoke with Bethany Montelongo from [facility] who stated three visits were approved for patient when it was originally submitted on October 18. She stated she would send information showing this. The case # is [id number]. Previous note has Ref # [id number] Telephone number [phone] with  Val

## 2021-03-01 NOTE — PROGRESS NOTES
OFFICE PROGRESS NOTES      Aileen Castle is a 76 y.o. male who has    CHIEF COMPLAINT AS FOLLOWS:  CHEST PAIN: Patient denies any C/O chest pains at this time.      SOB: No C/O SOB at this time.                 LEG EDEMA: Right leg edema   PALPITATIONS: Denies any C/O Palpitations   DIZZINESS: No C/O Dizziness   SYNCOPE: None   OTHER:                                     HPI: Patient is here for F/U on hisCVI, A-fib Arrhythmia & HTN problems . CCVI: Patient has known Hx of  CVI. Had multiple ablations in the past.  Arrhythmia: Patient has known Hx of atrial fibrillation. HTN: Patient has known Hx of essential HTN. Has been treated with guideline recommended medical / physical/ diet therapy as stated below. He does not have any new complaints at this time. Current Outpatient Medications   Medication Sig Dispense Refill    warfarin (COUMADIN) 7.5 MG tablet Take 1 tablet by mouth every other day Alternating with 6mg every other day 45 tablet 1    ondansetron (ZOFRAN) 4 MG tablet Take 1 tablet by mouth daily as needed for Nausea or Vomiting 20 tablet 0    sotalol (BETAPACE) 80 MG tablet Take 1 tablet by mouth 2 times daily 60 tablet 5    Omega-3 Fatty Acids (FISH OIL PO) Take by mouth      warfarin (COUMADIN) 6 MG tablet TAKE 1 TABLET BY MOUTH EVERY OTHER DAY 90 tablet 1    magnesium oxide (MAG-OX) 400 (241.3 Mg) MG TABS tablet Take 0.5 tablets by mouth daily (Patient taking differently: Take 400 mg by mouth daily ) 30 tablet 0     No current facility-administered medications for this visit.       Allergies: Aspirin  Review of Systems:    Constitutional: Negative for diaphoresis and fatigue  Respiratory: Negative for shortness of breath  Cardiovascular: Negative for chest pain, dyspnea on exertion, claudication, edema, irregular heartbeat, murmur, palpitations or shortness of breath Musculoskeletal: Negative for muscle pain, muscular weakness, negative for pain in arm and leg or swelling in foot and leg    Objective:  /72   Pulse 53   Ht 5' 10\" (1.778 m)   Wt 264 lb 12.8 oz (120.1 kg)   BMI 37.99 kg/m²   Wt Readings from Last 3 Encounters:   03/01/21 264 lb 12.8 oz (120.1 kg)   01/27/21 276 lb 3.2 oz (125.3 kg)   12/03/20 260 lb (117.9 kg)     Body mass index is 37.99 kg/m². GENERAL - Alert, oriented, pleasant, in no apparent distress. EYES: No jaundice, no conjunctival pallor. Neck - Supple. No jugular venous distention noted. No carotid bruits. Cardiovascular  Normal S1 and S2 without obvious murmur or gallop. Extremities - No cyanosis, clubbing, or significant edema. Pulmonary  No respiratory distress. No wheezes or rales.       Lab Review   No results found for: TROPONINT  No results found for: BNP, PROBNP  Lab Results   Component Value Date    INR 2.00 02/17/2021    INR 1.71 (H) 01/25/2021     No results found for: LABA1C  Lab Results   Component Value Date    WBC 8.4 12/02/2020    WBC 6.6 09/23/2020    HCT 50.2 12/02/2020    HCT 51.0 09/24/2020    MCV 93.8 12/02/2020    MCV 93.6 09/23/2020     12/02/2020     09/23/2020     No results found for: CHOL, TRIG, HDL, LDLCALC, LDLDIRECT, CHOLHDLRATIO  Lab Results   Component Value Date    ALT 29 08/07/2020    ALT 24 08/06/2020    AST 20 08/07/2020    AST 16 08/06/2020     BMP:    Lab Results   Component Value Date     12/02/2020     09/24/2020    K 4.0 12/02/2020    K 4.0 09/24/2020     12/02/2020     09/23/2020    CO2 22 12/02/2020    CO2 29 09/24/2020    BUN 9 12/02/2020    BUN 11 09/23/2020    CREATININE 0.7 12/02/2020    CREATININE 0.9 09/24/2020    CREATININE 0.8 09/23/2020     CMP:   Lab Results   Component Value Date     12/02/2020     09/24/2020    K 4.0 12/02/2020    K 4.0 09/24/2020     12/02/2020     09/23/2020    CO2 22 12/02/2020 CO2 29 09/24/2020    BUN 9 12/02/2020    BUN 11 09/23/2020    CREATININE 0.7 12/02/2020    CREATININE 0.9 09/24/2020    CREATININE 0.8 09/23/2020    PROT 6.2 08/07/2020    PROT 7.5 08/06/2020     No results found for: TSH, TSHHS      QUALITY MEASURES REVIEWED:  1.CAD:Patient is taking anti platelet agent:No  Patient does not have Hx of documented CAD  2. DYSLIPIDEMIA: Patient is on cholesterol lowering medication:No   3. Beta-Blocker therapy for CAD, if prior Myocardial Infarction:Yes   4. Counselled regarding smoking cessation. No   Patient does not Smoke. 5.Anticoagulation therapy (for A.Fib) Yes   Does Not have A.Fib.  6.Discussed weight management strategies. Assessment & Plan:    Primary / Secondary prevention is the goal by aggressive risk modification, healthy and therapeutic life style changes for cardiovascular risk reduction. Various goals are discussed and multiple questions answered.       CAD:None known  HTN:well controlled on current medical regimen, see list above.            - changes in  treatment:   no   CARDIOMYOPATHY: None known   CONGESTIVE HEART FAILURE: NO KNOWN HISTORY.      VHD: No significant VHD noted  ARRHYTHMIAS: Terence Thurman has H/O Atrial fibrillation                                He is rate controlled & on anticoagulation.                                Patient is in NSR with the help of anti arrhythmics. OTHER RELEVANT DIAGNOSIS:as noted in patient's active problem list:  CVI: S/P Left GSV , Right GSV & SSV ablations. Continue wearing stockings as there is residual deep vein reflux bilaterally. .  TESTS ORDERED: none this visit.                                    All previously ordered tests reviewed.  MEDICATIONS: CPM   Office f/u with  per schedule.

## 2021-03-01 NOTE — LETTER
[address]  [address] [address]  Phone: [phone]  Fax: [phone]    Kingsley Woodruff MD        March 1, 2021     No primary care provider on file. No primary provider on file. Patient: Emery Kate  MR Number: [id number]  YOB: 1955  Date of Visit: 3/1/2021    Dear Dr. Atkinson Friendly primary care provider on file.:    Thank you for the request for consultation for Ailyn Parker to me for the evaluation of ***. Below are the relevant portions of my assessment and plan of care. If you have questions, please do not hesitate to call me. I look forward to following Fabrizio Roth along with you.     Sincerely,        Kingsley Woodruff MD

## 2021-03-03 RX ORDER — SOTALOL HYDROCHLORIDE 80 MG/1
80 TABLET ORAL 2 TIMES DAILY
Qty: 60 TABLET | Refills: 5 | Status: SHIPPED | OUTPATIENT
Start: 2021-03-03 | End: 2021-04-27 | Stop reason: SDUPTHER

## 2021-03-05 ENCOUNTER — TELEPHONE (OUTPATIENT)
Dept: CARDIOLOGY CLINIC | Age: 66
End: 2021-03-05

## 2021-03-05 ENCOUNTER — PROCEDURE VISIT (OUTPATIENT)
Dept: CARDIOLOGY CLINIC | Age: 66
End: 2021-03-05
Payer: MEDICARE

## 2021-03-05 DIAGNOSIS — Z45.09 ENCOUNTER FOR ELECTRONIC ANALYSIS OF REVEAL EVENT RECORDER: ICD-10-CM

## 2021-03-05 DIAGNOSIS — I48.91 ATRIAL FIBRILLATION, UNSPECIFIED TYPE (HCC): Primary | ICD-10-CM

## 2021-03-15 ENCOUNTER — TELEPHONE (OUTPATIENT)
Dept: PHARMACY | Age: 66
End: 2021-03-15

## 2021-03-15 NOTE — TELEPHONE ENCOUNTER
Called to confirm INR drawn today. Patient forgot and can go in on Thursday 3/18.      CLINICAL PHARMACY CONSULT: MED RECONCILIATION/REVIEW ADDENDUM    For Pharmacy Admin Tracking Only    PHSO: Yes  Total # of Interventions Recommended: 0  Total Interventions Accepted: 0  Time Spent (min): 5    Bibiana Barrios PharmD

## 2021-03-17 ENCOUNTER — TELEPHONE (OUTPATIENT)
Dept: CARDIOLOGY CLINIC | Age: 66
End: 2021-03-17

## 2021-03-17 NOTE — TELEPHONE ENCOUNTER
I spoke with rep from [facility] [employer] [facility] regarding patient's 3 venous ablation procedures. 10/30/20, 12/18/20, & 1/26/2. Retro active Prior Authorization Approval was given for the three procedures. I was advised that a hard copy would be sent.   Ref # [id number]

## 2021-03-18 ENCOUNTER — ANTI-COAG VISIT (OUTPATIENT)
Dept: PHARMACY | Age: 66
End: 2021-03-18
Payer: MEDICARE

## 2021-03-18 ENCOUNTER — TELEPHONE (OUTPATIENT)
Dept: CARDIOLOGY CLINIC | Age: 66
End: 2021-03-18

## 2021-03-18 DIAGNOSIS — I48.91 ATRIAL FIBRILLATION, UNSPECIFIED TYPE (HCC): ICD-10-CM

## 2021-03-18 DIAGNOSIS — Z79.01 ANTICOAGULATED ON COUMADIN: ICD-10-CM

## 2021-03-18 LAB — INR BLD: 2.6

## 2021-03-18 PROCEDURE — 99211 OFF/OP EST MAY X REQ PHY/QHP: CPT

## 2021-03-18 NOTE — TELEPHONE ENCOUNTER
Prior Authorization request authorized for venous [id number] X 3,  10/8/20 through 1/27/21 received from [address] [place] [address]. Scanned in to [employer].

## 2021-03-18 NOTE — PROGRESS NOTES
Medication Management Service  Dov Henley [address] [phone]    Visit Date: 3/18/2021   Subjective: All appointments have been changed to telephone visits at this time due to COVID-19. Sailaja Maza is a 76 y.o. male who is having INR checked by [facility]. INR results were sent to the clinic for anticoagulation monitoring and adjustment. Patient results called in to clinic for warfarin management due to  Indication:   atrial fibrillation. INR goal: of 2.0-3.0. Duration of therapy: indefinite. Patient reports the following:   Adherent with regimen  Missed or extra doses:  None   Bleeding or thromboembolic side effects:  None  Significant medication changes:  None  Significant dietary changes: None  Significant alcohol or tobacco changes: None  Significant recent illness, disease state changes, or hospitalization:  None  Upcoming surgeries or procedures:  None  Falls: None           Assessment and Plan     PT/INR performed by Lab. INR today is 2.6, therapeutic. Patient will be moving to [facility] permanently in May. Patient will not be in town until May. Plan: Will continue current regimen of warfarin 6mg alternating with 7.5mg. .     Recheck INR in 7.5 week(s). Patient has standing lab orders for PT/INR. Patient verbalized understanding of dosing directions and information discussed. Progress note sent to referring office. Patient acknowledges working in consult agreement with pharmacist as referred by his/her physician. Patient accepted that for purposes of billing, this is a virtual visit with your provider for which we will submit a claim for reimbursement with your insurance company. You may be responsible for any copays, coinsurance amounts or other amounts not covered by your insurance company.      Electronically signed by Percy Rolon, [address] [place] [address] on 3/18/21 at 5:03 PM EDT    CLINICAL PHARMACY CONSULT: MED RECONCILIATION/REVIEW ADDENDUM    For Pharmacy Admin Tracking Only    PHSO: Yes  Total # of Interventions Recommended: 0    - Maintenance Safety Lab Monitoring #: 1    Total Interventions Accepted: 0  Time Spent (min): 15    Janessa GutierresD

## 2021-03-29 ENCOUNTER — TELEPHONE (OUTPATIENT)
Dept: PHARMACY | Age: 66
End: 2021-03-29

## 2021-03-29 RX ORDER — WARFARIN SODIUM 6 MG/1
6 TABLET ORAL EVERY OTHER DAY
Qty: 45 TABLET | Refills: 0 | Status: SHIPPED | OUTPATIENT
Start: 2021-03-29 | End: 2021-04-27 | Stop reason: SDUPTHER

## 2021-03-29 NOTE — TELEPHONE ENCOUNTER
Refill fax request received. ERx sent.      CLINICAL PHARMACY CONSULT: MED RECONCILIATION/REVIEW ADDENDUM    For Pharmacy Admin Tracking Only    PHSO: Yes  Total # of Interventions Recommended: 0  - Refills Provided #: 1  - Maintenance Safety Lab Monitoring #: 1  Total Interventions Accepted: 0  Time Spent (min): 5    Janessa LinaresD

## 2021-04-22 ENCOUNTER — TELEPHONE (OUTPATIENT)
Dept: PHARMACY | Age: 66
End: 2021-04-22

## 2021-04-22 NOTE — TELEPHONE ENCOUNTER
Heart house appointment changed- he will now only be in town 4/27-4/28. Patient will go to [address] [place] [address] lab AM on 4/28. He is moving to [facility] next month.    Order faxed to [facility]ia Caldwell Intervention Detail:    Total # of Interventions Recommended:    Total # of Interventions Accepted:    Time Spent (min): 10

## 2021-04-23 ENCOUNTER — PROCEDURE VISIT (OUTPATIENT)
Dept: CARDIOLOGY CLINIC | Age: 66
End: 2021-04-23
Payer: MEDICARE

## 2021-04-23 DIAGNOSIS — I48.91 ATRIAL FIBRILLATION, UNSPECIFIED TYPE (HCC): Primary | ICD-10-CM

## 2021-04-23 DIAGNOSIS — Z45.09 ENCOUNTER FOR ELECTRONIC ANALYSIS OF REVEAL EVENT RECORDER: ICD-10-CM

## 2021-04-27 ENCOUNTER — OFFICE VISIT (OUTPATIENT)
Dept: CARDIOLOGY CLINIC | Age: 66
End: 2021-04-27
Payer: MEDICARE

## 2021-04-27 VITALS
HEIGHT: 70 IN | BODY MASS INDEX: 38.85 KG/M2 | HEART RATE: 57 BPM | DIASTOLIC BLOOD PRESSURE: 82 MMHG | WEIGHT: 271.4 LBS | SYSTOLIC BLOOD PRESSURE: 130 MMHG

## 2021-04-27 DIAGNOSIS — I48.91 ATRIAL FIBRILLATION, UNSPECIFIED TYPE (HCC): ICD-10-CM

## 2021-04-27 DIAGNOSIS — E66.01 CLASS 2 SEVERE OBESITY DUE TO EXCESS CALORIES WITH SERIOUS COMORBIDITY AND BODY MASS INDEX (BMI) OF 39.0 TO 39.9 IN ADULT (HCC): ICD-10-CM

## 2021-04-27 DIAGNOSIS — Z79.899 ENCOUNTER FOR MONITORING SOTALOL THERAPY: ICD-10-CM

## 2021-04-27 DIAGNOSIS — G45.9 TIA (TRANSIENT ISCHEMIC ATTACK): ICD-10-CM

## 2021-04-27 DIAGNOSIS — I48.0 PAROXYSMAL ATRIAL FIBRILLATION (HCC): Primary | ICD-10-CM

## 2021-04-27 DIAGNOSIS — I10 ESSENTIAL HYPERTENSION: ICD-10-CM

## 2021-04-27 DIAGNOSIS — I47.1 AVNRT (AV NODAL RE-ENTRY TACHYCARDIA) (HCC): ICD-10-CM

## 2021-04-27 DIAGNOSIS — Z51.81 ENCOUNTER FOR MONITORING SOTALOL THERAPY: ICD-10-CM

## 2021-04-27 DIAGNOSIS — Z79.01 CHRONIC ANTICOAGULATION: ICD-10-CM

## 2021-04-27 DIAGNOSIS — R07.2 PRECORDIAL PAIN: ICD-10-CM

## 2021-04-27 PROCEDURE — 3017F COLORECTAL CA SCREEN DOC REV: CPT | Performed by: INTERNAL MEDICINE

## 2021-04-27 PROCEDURE — 1036F TOBACCO NON-USER: CPT | Performed by: INTERNAL MEDICINE

## 2021-04-27 PROCEDURE — 1123F ACP DISCUSS/DSCN MKR DOCD: CPT | Performed by: INTERNAL MEDICINE

## 2021-04-27 PROCEDURE — G8427 DOCREV CUR MEDS BY ELIG CLIN: HCPCS | Performed by: INTERNAL MEDICINE

## 2021-04-27 PROCEDURE — 4040F PNEUMOC VAC/ADMIN/RCVD: CPT | Performed by: INTERNAL MEDICINE

## 2021-04-27 PROCEDURE — G8417 CALC BMI ABV UP PARAM F/U: HCPCS | Performed by: INTERNAL MEDICINE

## 2021-04-27 PROCEDURE — 99214 OFFICE O/P EST MOD 30 MIN: CPT | Performed by: INTERNAL MEDICINE

## 2021-04-27 RX ORDER — WARFARIN SODIUM 7.5 MG/1
7.5 TABLET ORAL EVERY OTHER DAY
Qty: 60 TABLET | Refills: 3 | Status: SHIPPED | OUTPATIENT
Start: 2021-04-27

## 2021-04-27 RX ORDER — SOTALOL HYDROCHLORIDE 80 MG/1
80 TABLET ORAL 2 TIMES DAILY
Qty: 180 TABLET | Refills: 5 | Status: SHIPPED | OUTPATIENT
Start: 2021-04-27

## 2021-04-27 RX ORDER — WARFARIN SODIUM 6 MG/1
6 TABLET ORAL EVERY OTHER DAY
Qty: 45 TABLET | Refills: 3 | Status: SHIPPED | OUTPATIENT
Start: 2021-04-27
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
## 2021-04-28 DIAGNOSIS — I48.91 ATRIAL FIBRILLATION, UNSPECIFIED TYPE (HCC): ICD-10-CM

## 2021-04-28 LAB
INR BLD: 2.42 (ref 0.86–1.14)
PROTHROMBIN TIME: 28.3 SEC (ref 10–13.2)

## 2021-04-29 ENCOUNTER — ANTI-COAG VISIT (OUTPATIENT)
Dept: PHARMACY | Age: 66
End: 2021-04-29
Payer: MEDICARE

## 2021-04-29 DIAGNOSIS — Z79.01 ANTICOAGULATED ON COUMADIN: ICD-10-CM

## 2021-04-29 DIAGNOSIS — I48.91 ATRIAL FIBRILLATION, UNSPECIFIED TYPE (HCC): ICD-10-CM

## 2021-04-29 PROCEDURE — 99211 OFF/OP EST MAY X REQ PHY/QHP: CPT

## 2021-04-29 NOTE — PROGRESS NOTES
Medication Management Service  [address] [phone]  Mylene Candelaria [phone]    Visit Date: 4/29/2021   Subjective: All appointments have been changed to telephone visits at this time due to COVID-19. Shawna Borges is a 76 y.o. male who is having INR checked by [facility]. INR results were sent to the clinic for anticoagulation monitoring and adjustment. Patient results called in to clinic for warfarin management due to  Indication:   atrial fibrillation. INR goal: of 2.0-3.0. Duration of therapy: indefinite. Patient reports the following:   Adherent with regimen  Missed or extra doses:  None   Bleeding or thromboembolic side effects:  None  Significant medication changes:  None  Significant dietary changes: None  Significant alcohol or tobacco changes: None  Significant recent illness, disease state changes, or hospitalization:  None  Upcoming surgeries or procedures:  None  Falls: None           Assessment and Plan     PT/INR performed by Lab. INR today is 2.42, therapeutic. Plan: Will continue current regimen of warfarin 7.5mg alternating with 6mg. Recheck INR in 4 week(s). Patient leaving for [facility] 5/30. Will have INR checked at [facility] week of 5/24    Patient has standing lab orders for PT/INR. Patient verbalized understanding of dosing directions and information discussed. Progress note sent to referring office. Patient acknowledges working in consult agreement with pharmacist as referred by his/her physician. Patient accepted that for purposes of billing, this is a virtual visit with your provider for which we will submit a claim for reimbursement with your insurance company. You may be responsible for any copays, coinsurance amounts or other amounts not covered by your insurance company.      Electronically signed by Ashley Smith [facility] on 4/29/21 at 8:53 AM EDT    For Pharmacy [address] [place] [address] Intervention Detail:    Total # of Interventions Recommended:    Total # of Interventions Accepted:    Time Spent (min): 15

## 2021-04-30 PROCEDURE — 93298 REM INTERROG DEV EVAL SCRMS: CPT | Performed by: INTERNAL MEDICINE

## 2021-04-30 PROCEDURE — G2066 INTER DEVC REMOTE 30D: HCPCS | Performed by: INTERNAL MEDICINE

## 2021-05-27 ENCOUNTER — TELEPHONE (OUTPATIENT)
Dept: PHARMACY | Age: 66
End: 2021-05-27

## 2021-05-27 NOTE — TELEPHONE ENCOUNTER
Called to check status of labs. Patient saw time of appointment on [facility] for phone visit and went to lab at that time, but lab had already closed. Patient moving to [place] this weekend and can't go to lab tomorrow. Patient working on establishing physician in [place]. INR has been very stable. Patient will call once in [place] with lab nearby or if physician established.      For Pharmacy Admin Tracking Only     Intervention Detail:    Total # of Interventions Recommended:    Total # of Interventions Accepted:    Time Spent (min): 10

## 2021-07-19 ENCOUNTER — TELEPHONE (OUTPATIENT)
Dept: PHARMACY | Age: 66
End: 2021-07-19

## 2021-07-19 NOTE — TELEPHONE ENCOUNTER
Called patient. He confirms he has established care in [place]. Discharged from clinic. Faxed notification to office of Dr. Vicky Curry.      For Pharmacy Admin Tracking Only     Intervention Detail:    Total # of Interventions Recommended:    Total # of Interventions Accepted:    Time Spent (min): 10

## 2021-07-28 ENCOUNTER — PROCEDURE VISIT (OUTPATIENT)
Dept: CARDIOLOGY CLINIC | Age: 66
End: 2021-07-28
Payer: MEDICARE

## 2021-07-28 DIAGNOSIS — Z45.09 ENCOUNTER FOR ELECTRONIC ANALYSIS OF REVEAL EVENT RECORDER: ICD-10-CM

## 2021-07-28 DIAGNOSIS — I48.91 ATRIAL FIBRILLATION, UNSPECIFIED TYPE (HCC): Primary | ICD-10-CM

## 2021-08-02 PROCEDURE — G2066 INTER DEVC REMOTE 30D: HCPCS | Performed by: INTERNAL MEDICINE

## 2021-08-02 PROCEDURE — 93298 REM INTERROG DEV EVAL SCRMS: CPT | Performed by: INTERNAL MEDICINE

## 2021-09-22 ENCOUNTER — TELEPHONE (OUTPATIENT)
Dept: CARDIOLOGY CLINIC | Age: 66
End: 2021-09-22

## 2021-10-01 ENCOUNTER — PROCEDURE VISIT (OUTPATIENT)
Dept: CARDIOLOGY CLINIC | Age: 66
End: 2021-10-01

## 2021-10-01 DIAGNOSIS — Z45.09 ENCOUNTER FOR ELECTRONIC ANALYSIS OF REVEAL EVENT RECORDER: ICD-10-CM

## 2021-10-01 DIAGNOSIS — I48.91 ATRIAL FIBRILLATION, UNSPECIFIED TYPE (HCC): Primary | ICD-10-CM

## 2021-10-15 ENCOUNTER — PROCEDURE VISIT (OUTPATIENT)
Dept: CARDIOLOGY CLINIC | Age: 66
End: 2021-10-15

## 2021-10-15 DIAGNOSIS — I48.91 ATRIAL FIBRILLATION, UNSPECIFIED TYPE (HCC): Primary | ICD-10-CM

## 2021-10-15 DIAGNOSIS — Z45.09 ENCOUNTER FOR ELECTRONIC ANALYSIS OF REVEAL EVENT RECORDER: ICD-10-CM

## 2021-10-15 PROCEDURE — 93297 REM INTERROG DEV EVAL ICPMS: CPT | Performed by: INTERNAL MEDICINE

## 2021-10-15 PROCEDURE — 93298 REM INTERROG DEV EVAL SCRMS: CPT | Performed by: INTERNAL MEDICINE

## 2021-11-01 PROCEDURE — 93298 REM INTERROG DEV EVAL SCRMS: CPT | Performed by: INTERNAL MEDICINE

## 2021-11-01 PROCEDURE — G2066 INTER DEVC REMOTE 30D: HCPCS | Performed by: INTERNAL MEDICINE

## 2021-12-16 ENCOUNTER — PROCEDURE VISIT (OUTPATIENT)
Dept: CARDIOLOGY CLINIC | Age: 66
End: 2021-12-16
Payer: MEDICARE

## 2021-12-16 DIAGNOSIS — Z45.09 ENCOUNTER FOR ELECTRONIC ANALYSIS OF REVEAL EVENT RECORDER: ICD-10-CM

## 2021-12-16 DIAGNOSIS — I48.91 ATRIAL FIBRILLATION, UNSPECIFIED TYPE (HCC): Primary | ICD-10-CM

## 2021-12-16 PROCEDURE — 93298 REM INTERROG DEV EVAL SCRMS: CPT | Performed by: INTERNAL MEDICINE

## 2021-12-16 PROCEDURE — G2066 INTER DEVC REMOTE 30D: HCPCS | Performed by: INTERNAL MEDICINE

## 2022-01-14 ENCOUNTER — PROCEDURE VISIT (OUTPATIENT)
Dept: CARDIOLOGY CLINIC | Age: 67
End: 2022-01-14
Payer: MEDICARE

## 2022-01-14 DIAGNOSIS — Z45.09 ENCOUNTER FOR ELECTRONIC ANALYSIS OF REVEAL EVENT RECORDER: ICD-10-CM

## 2022-01-14 DIAGNOSIS — I48.91 ATRIAL FIBRILLATION, UNSPECIFIED TYPE (HCC): Primary | ICD-10-CM

## 2022-01-17 PROCEDURE — 93298 REM INTERROG DEV EVAL SCRMS: CPT | Performed by: NURSE PRACTITIONER

## 2022-01-17 PROCEDURE — 93297 REM INTERROG DEV EVAL ICPMS: CPT | Performed by: NURSE PRACTITIONER

## 2022-03-16 ENCOUNTER — TELEPHONE (OUTPATIENT)
Dept: CARDIOLOGY CLINIC | Age: 67
End: 2022-03-16

## 2022-03-16 NOTE — TELEPHONE ENCOUNTER
Patient called - DOS 8/2/2021, 12/16/21, 1/17/2022-  we received updated insurance info, it is updated on his account. I emailed [email or web address]   Requesting-    If these DOS can be billed to the [facility] THIERRY ROBERT . also, here is a copy of his insurance.

## 2023-04-19 PROBLEM — Z98.890: Status: ACTIVE | Noted: 2019-08-14

## 2023-04-19 PROBLEM — G45.9 TRANSIENT ISCHEMIC ATTACK: Status: ACTIVE | Noted: 2019-09-03

## 2023-04-19 PROBLEM — I83.893 VARICOSE VEINS OF BOTH LEGS WITH EDEMA: Status: RESOLVED | Noted: 2020-10-06 | Resolved: 2023-04-19

## 2023-04-19 PROBLEM — I83.893 VARICOSE VEINS OF BOTH LEGS WITH EDEMA: Status: ACTIVE | Noted: 2020-10-06

## 2023-04-19 PROBLEM — F32.A DEPRESSION: Status: ACTIVE | Noted: 2019-08-14

## 2023-04-19 PROBLEM — Z79.01 ANTICOAGULATED ON COUMADIN: Status: ACTIVE | Noted: 2019-08-14

## 2023-04-19 PROBLEM — G47.30 SLEEP APNEA: Status: ACTIVE | Noted: 2019-08-14

## 2023-04-19 PROBLEM — Z79.01 LONG TERM (CURRENT) USE OF ANTICOAGULANTS: Status: ACTIVE | Noted: 2020-08-13

## 2023-04-19 PROBLEM — I48.0 PAROXYSMAL ATRIAL FIBRILLATION (HCC): Status: ACTIVE | Noted: 2019-08-14

## 2023-04-19 PROBLEM — E66.01 MORBID OBESITY (HCC): Status: ACTIVE | Noted: 2023-04-19

## 2023-04-19 PROBLEM — I47.19 AVNRT (AV NODAL RE-ENTRY TACHYCARDIA): Status: ACTIVE | Noted: 2020-10-06

## 2023-04-19 PROBLEM — I10 ESSENTIAL HYPERTENSION: Status: ACTIVE | Noted: 2019-08-14

## 2023-04-19 PROBLEM — I73.9 PERIPHERAL VASCULAR DISEASE (HCC): Status: ACTIVE | Noted: 2023-04-19

## 2024-05-09 ENCOUNTER — TELEPHONE (OUTPATIENT)
Age: 69
End: 2024-05-09

## 2024-05-09 NOTE — TELEPHONE ENCOUNTER
Received call from Nikki Brown, advising she will faxing a chart review request to fax# [phone]. Call back information will be included on the fax.

## 2024-12-17 ENCOUNTER — TELEPHONE (OUTPATIENT)
Age: 69
End: 2024-12-17

## 2024-12-17 NOTE — TELEPHONE ENCOUNTER
Lesley from [facility] has reached out to to see if we had received the medical records request. I verified that we had not, and referred her to Medical Records by providing number and fax. She said she is already in contact with medical records, but has not received the info. She will resend forms.

## 2024-12-18 NOTE — TELEPHONE ENCOUNTER
Lesley from [facility] called in to check if we have received the medical record request which was faxed to us few times for her. Did check on the chart media was unable to see any provided her with the back office fax number. She is going to refax now kindly confirm with her once received. Thanks

## 2024-12-19 NOTE — TELEPHONE ENCOUNTER
I called Lesley and got  LM that the request sent to [facility] for a former patient (no name was provided on VM was sent to our Medical records dept. To  process the requested records. I left my name, phone#  and location I was calling from if she has questions. Fax confirmation will be attached to this message.

## (undated) DEVICE — APPLICATOR MEDICATED 26 CC SOLUTION HI LT ORNG CHLORAPREP

## (undated) DEVICE — TUBING, SUCTION, 3/16" X 10', STRAIGHT: Brand: MEDLINE

## (undated) DEVICE — SYRINGE IRRIG 60ML SFT PLIABLE BLB EZ TO GRP 1 HND USE W/

## (undated) DEVICE — BLADE CLIPPER GEN PURP NS

## (undated) DEVICE — GAUZE,SPONGE,4"X4",16PLY,XRAY,STRL,LF: Brand: MEDLINE

## (undated) DEVICE — SYRINGE, LUER SLIP, 20ML: Brand: MEDLINE

## (undated) DEVICE — WOUND RETRACTOR AND PROTECTOR: Brand: ALEXIS O WOUND PROTECTOR-RETRACTOR

## (undated) DEVICE — MARKER SURG SKIN UTIL REGULAR/FINE 2 TIP W/ RUL AND 9 LBL

## (undated) DEVICE — TOTAL TRAY, DB, 100% SILI FOLEY, 16FR 10: Brand: MEDLINE

## (undated) DEVICE — TOWEL,OR,DSP,ST,BLUE,STD,6/PK,12PK/CS: Brand: MEDLINE

## (undated) DEVICE — SUTURE MCRYL SZ 4-0 L18IN ABSRB UD L19MM PS-2 3/8 CIR PRIM Y496G

## (undated) DEVICE — INTENDED FOR TISSUE SEPARATION, AND OTHER PROCEDURES THAT REQUIRE A SHARP SURGICAL BLADE TO PUNCTURE OR CUT.: Brand: BARD-PARKER ® STAINLESS STEEL BLADES

## (undated) DEVICE — STRIP SKIN CLSR W0.25XL4IN WHT SPUNBOUND FBR NYL HI ADH

## (undated) DEVICE — ELECTRODE ES AD CRDLSS PT RET REM POLYHESIVE

## (undated) DEVICE — PENCIL ES CRD L10FT HND SWCHING ROCK SWCH W/ EDGE COAT BLDE

## (undated) DEVICE — Device

## (undated) DEVICE — TUBING, SUCTION, 9/32" X 10', STRAIGHT: Brand: MEDLINE

## (undated) DEVICE — RELOAD STPL L60MM H1.5-3.6MM REG TISS BLU GRIPPING SURF B

## (undated) DEVICE — GLOVE SURG SZ 65 THK91MIL LTX FREE SYN POLYISOPRENE

## (undated) DEVICE — SPONGE LAP W18XL18IN WHT COT 4 PLY FLD STRUNG RADPQ DISP ST

## (undated) DEVICE — SEALER ENDOSCP NANO COAT OPN DIV CRV L JAW LIGASURE IMPACT

## (undated) DEVICE — YANKAUER,FLEXIBLE HANDLE,REGLR CAPACITY: Brand: MEDLINE INDUSTRIES, INC.

## (undated) DEVICE — SUTURE PERMAHAND SZ 3-0 L18IN NONABSORBABLE BLK L26MM SH C013D

## (undated) DEVICE — ADHESIVE SKIN CLSR 0.7ML TOP DERMBND ADV

## (undated) DEVICE — DRAPE SHEET ULTRAGARD: Brand: MEDLINE

## (undated) DEVICE — SPONGE GZ W4XL8IN COT WVN 12 PLY

## (undated) DEVICE — CONTAINER,SPECIMEN,OR STERILE,4OZ: Brand: MEDLINE

## (undated) DEVICE — COUNTER NDL 30 COUNT FOAM STRP SGL MAG

## (undated) DEVICE — SUTURE STRATAFIX SYMMETRIC SZ 1 L18IN ABSRB VLT CT1 L36CM SXPP1A404

## (undated) DEVICE — PAD GZ BORD ST 4INX10IN 2X8IN

## (undated) DEVICE — 34" SINGLE PATIENT USE HOVERMATT BREATHABLE: Brand: SINGLE PATIENT USE HOVERMATT

## (undated) DEVICE — GLOVE ORANGE PI 7   MSG9070

## (undated) DEVICE — SYRINGE BLB 50CC IRRIG PLIABLE FNGR FLNG GRAD FLSK DISP

## (undated) DEVICE — GLOVE SURG SZ 65 CRM LTX FREE POLYISOPRENE POLYMER BEAD ANTI

## (undated) DEVICE — SUTURE PERMA-HAND SZ 3-0 L18IN 17 STRND NONABSORBABLE BLK SA64H

## (undated) DEVICE — Z DUPLICATE USE 2624755 KIT NEG PRSS DSG W/ PRSS INDIC PTCH STRP 45ML CANSTR CARR

## (undated) DEVICE — SUTURE VCRL SZ 3-0 L36IN ABSRB VLT SH L26MM 1/2 CIR DBL J527H

## (undated) DEVICE — SUTURE PERMAHAND SZ 2-0 L17X18IN NONABSORBABLE BLK SILK SA65H

## (undated) DEVICE — SUTURE VCRL SZ 3-0 L27IN ABSRB UD L24MM FS-1 3/8 CIR REV J442H